# Patient Record
Sex: FEMALE | Race: OTHER | Employment: UNEMPLOYED | URBAN - METROPOLITAN AREA
[De-identification: names, ages, dates, MRNs, and addresses within clinical notes are randomized per-mention and may not be internally consistent; named-entity substitution may affect disease eponyms.]

---

## 2018-05-02 ENCOUNTER — OFFICE VISIT (OUTPATIENT)
Dept: FAMILY MEDICINE CLINIC | Age: 33
End: 2018-05-02

## 2018-05-02 VITALS
HEIGHT: 64 IN | SYSTOLIC BLOOD PRESSURE: 114 MMHG | HEART RATE: 66 BPM | TEMPERATURE: 95.7 F | OXYGEN SATURATION: 100 % | WEIGHT: 127 LBS | BODY MASS INDEX: 21.68 KG/M2 | DIASTOLIC BLOOD PRESSURE: 68 MMHG | RESPIRATION RATE: 16 BRPM

## 2018-05-02 DIAGNOSIS — N92.6 MISSED MENSES: Primary | ICD-10-CM

## 2018-05-02 DIAGNOSIS — Z92.29 H/O HIGH RISK MEDICATION TREATMENT: ICD-10-CM

## 2018-05-02 PROBLEM — Z34.90 PREGNANCY: Status: ACTIVE | Noted: 2018-05-02

## 2018-05-02 LAB
BILIRUB UR QL STRIP: NEGATIVE
GLUCOSE UR-MCNC: NEGATIVE MG/DL
HCG URINE, QL. (POC): POSITIVE
KETONES P FAST UR STRIP-MCNC: NEGATIVE MG/DL
PH UR STRIP: 7.5 [PH] (ref 4.6–8)
PROT UR QL STRIP: NEGATIVE
SP GR UR STRIP: 1.02 (ref 1–1.03)
UA UROBILINOGEN AMB POC: NORMAL (ref 0.2–1)
URINALYSIS CLARITY POC: CLEAR
URINALYSIS COLOR POC: YELLOW
URINE BLOOD POC: NEGATIVE
URINE LEUKOCYTES POC: NEGATIVE
URINE NITRITES POC: NEGATIVE
VALID INTERNAL CONTROL?: YES

## 2018-05-02 RX ORDER — SWAB
1 SWAB, NON-MEDICATED MISCELLANEOUS DAILY
COMMUNITY

## 2018-05-02 NOTE — PATIENT INSTRUCTIONS
Kal Lundberg consultas prenatales - [ Learning About Prenatal Visits ]  Instrucciones de cuidado    Las consultas prenatales regulares son muy importantes anthony cualquier [facility]. Estas breves visitas al [employer] podrían parecer simples y rutinarias. Monie pueden ayudarles a usted y olson bebé a mantenerse sanos. Olson médico está alerta a problemas que solo se pueden detectar si se los Beula Otis a usted y a olson bebé en forma regular. Estas consultas [employer] dan a usted y a olson médico tiempo para establecer júnior buena relación. Muchas mujeres acuden a consultas prenatales con júnior frecuencia de 4 a 6 semanas hasta la semana 28 del [facility]. [facility] consultas se [employer]. Con frecuencia, esto ocurre cada 2 o 3 semanas hasta la semana 36 del [facility]. Anthony el último mes del [facility], es probable que acuda a rustam a olson médico cada semana. Podría tener un programa distinto si tiene un problema médico o es adolescente. En distintos momentos del [facility]), se le harán exámenes y [place]. Algunos son [place]. Otros se hacen solamente cuando existen riesgos de que ocurra un problema. Todas las cosas que silvio por olson jong [place] a olson bebé en desarrollo. Descanse cuando lo necesite. Aliméntese heide, pretty abundante agua y silvio ejercicio de Layla regular. La atención de seguimiento es júnior parte clave de olson tratamiento y seguridad. Asegúrese de hacer y acudir a todas las citas, y llame a olson médico si está teniendo problemas. También es júnior buena idea saber los resultados de los exámenes y mantener júnior lista de los medicamentos que elke. ¿Qué ocurre anthony júnior consulta prenatal?  · Le medirán la presión arterial y le harán también análisis de [facility]. Es posible que [employer] sara análisis de Sergey jacobson. Si necesita ir al baño mientras espera al médico, avísele a la enfermera. Classie Mikey un recipiente para muestras con el fin de poder examinar olson orina.   · La pesarán y le medirán el abdomen. · Mims médico podría escuchar los latidos del corazón de mims bebé con un estetoscopio especial.  · Anthony el melony trimestre, el médico le revisará el azúcar en la mian (prueba de tolerancia a la glucosa) para detectar diabetes que puede aparecer anthony el Arturo Hose. Esta diabetes se conoce atul diabetes gestacional y puede hacer daño al bebé. · Se le harán pruebas para detectar infecciones que podrían hacer daño a mims bebé. Estas incluyen el estreptococo de deandre B y hepatitis B.  · Mims médico podría hacerle ecografías para detectar cualquier problema. Así también se comprobará la posición de mims bebé. Ozzie Beni [place] ondas de april para producir júnior imagen de mims bebé. · Es posible que le sara otras pruebas en cualquier momento del [place]. · Aproveche las consultas para conversar con mims médico acerca de cualquier inquietud que tenga. ¿Cómo puede cuidarse en el hogar? · Descanse lo suficiente. · [employer] días, si mims médico lo [place]. Si no ha hecho ejercicio en el pasado, comience poco a poco. Nell muchas caminatas cortas todos los días. · Siga júnior Elizabeth Esters. Asegúrese de incluir en mims dieta abundantes frijoles (habichuelas), arvejas (chícharos) y verduras de hojas verdes. · Steffanie abundantes líquidos, los suficientes atul para que mims orina sea de color amarillo andrés o transparente atul el agua. Steffanie agua. Reduzca las bebidas con cafeína, atul el café, el té y las bebidas cola. Si tiene [employer], del corazón o del hígado y tiene que Karen's líquidos, hable con mims médico antes de aumentar mims consumo. · Evite el humo del tabaco, el alcohol y las drogas, los gases químicos, los vapores de la pintura y los venenos. No fume ni use tabaco. Si necesita ayuda para dejar de fumar, hable con mims médico sobre programas y medicamentos para dejar de fumar. Pueden aumentar david probabilidades de dejar el hábito para siempre.   · Revise todos los medicamentos que esté tomando con mims Shilpa Collar sea necesario cambiar algunos de david medicamentos de rutina para proteger al bebé. No deje de boyd ni comience a boyd ningún medicamento sin hablar antes con mims médico.  ¿Dónde puede encontrar más información en inglés? Jorge Lopez a http://jacy-rashida.info/. Escriba K668 en la búsqueda para aprender más acerca de \"Aprenda sobre las consultas prenatales - [ Learning About Prenatal Visits ]. \"  Revisado: 16 marzo, 2017  Versión del contenido: 11.4  © 5123-6263 Healthwise, Incorporated. Las instrucciones de cuidado fueron adaptadas bajo licencia por [employer] (which disclaims liability or warranty for this information). Si usted tiene [place] afección médica o sobre estas instrucciones, siempre pregunte a mims profesional de jong. Healthwise, Incorporated niega toda garantía o responsabilidad por mims uso de esta información. Semanas 14 a 18 de mims embarazo: Instrucciones de cuidado - [ Elenore Scriver 14 to 25 of Your Pregnancy: Care Instructions ]  Instrucciones de cuidado    [address], es posible que se le empiece a notar que está Chuck de [place]. También podría observar algunos cambios en la piel, atul picazón en algunas zonas de las lamar de las ines o acné en la sol. Arby Layer, mims bebé puede orinar y david primeras heces (meconio) comienzan a acumularse en el intestino. Alannah Horsfall a crecerle el matilda en la ari. En mims próxima visita, [employer] 18 y 21, mims médico podría hacerle júnior ecografía. La prueba le permite al médico verificar si hay ciertos problemas. Mims médico también puede determinar el sexo de mims bebé. Liza es un buen momento para pensar si Azell Leyland si mims bebé es Vanetta Hunger. Hable con mims médico acerca de ponerse la vacuna contra la gripe para ayudar a mantenerse hyacinth anthony el embarazo. Con el transcurrir del [facility], es común sentirse preocupada o ansiosa. Mims cuerpo está [employer].  Y usted está pensando en dahlia a kemar, en la jong de mims bebé y en convertirse en madre. Puede aprender a sobrellevar la ansiedad y el estrés que siente. La atención de seguimiento es júnior parte clave de mims tratamiento y seguridad. Asegúrese de hacer y acudir a todas las citas, y llame a mims médico si está teniendo problemas. También es júnior buena idea saber los resultados de los exámenes y mantener júnior lista de los medicamentos que elke. ¿Cómo puede cuidarse en el hogar? ?Harrel Socorro  ? · Pida ayuda para cocinar y [employer]. ? · Entienda quién o qué le provoca estrés. Evite a estas personas o situaciones tanto atul le sea posible. ? · Relájese todos los días. Tomarse descansos de 10 a 15 minutos puede hacerle sentir júnior gran diferencia. Camine, escuche música o tome un baño tibio. ? · [place] clases de yoga o educación prenatal para aprender técnicas de relajación. También puede comprar un disco compacto de relajación. ? · [employer] lista de david temores acerca de tener el bebé y ser [place]. Comparta la lista con alguien de mims confianza. [place] inquietudes son verdaderamente pequeñas, y trate de deshacerse de ellas. Ejercicio  ? · Si no hizo mucho ejercicio antes del embarazo, comience poco a poco. Lo mejor es caminar. Regule mims ritmo y silvio un poco más cada día. ? · La caminata rápida, el trote lento, los ejercicios aeróbicos de bajo impacto, los ejercicios aeróbicos en el agua y el yoga son Erlene Sravanthi opciones. Algunos deportes, atul el buceo, la equitación, el esquí Angelica, la gimnasia y el esquí acuático no son Marsa Balsam idea. ? · Trate de hacer por lo menos 2½ horas de ejercicio moderado a la semana, atul, por ejemplo, júnior caminata rápida. Estefany Fair de hacer esto es estar activo 30 minutos al día, por lo menos 5 días de la [place]. Está heide estar activo en bloques de 10 minutos o más anthony el día y la [place]. ? · Use ropa holgada.  Use zapatos y un sostén que le proporcionen un buen soporte. ? · Lola Puffer de calentamiento y enfriamiento para comenzar y finalizar david ejercicios. ? · Si desea usar pesas, asegúrese de que elba livianas. Estas reducen la tensión en las articulaciones. ?Manténgase en el peso ideal para usted  ? · Los expertos recomiendan el aumento de 1 castillo (medio kilo) al [employer] anthony los 3 primeros meses del [facility]. ? · También recomiendan aumentar 1 castillo a la [employer] 6 últimos meses del [facility], para aumentar de 25 a 35 libras (11 a 16 kg) en total.   ? · Si está por debajo del peso recomendable para usted, necesitará aumentar más, de 28 a 40 libras (13 a 18 kg) aproximadamente. ? · Si tiene sobrepeso, quizás no deba aumentar tanto de [place], de 15 a 25 libras (7 a 11 kg) aproximadamente. ? · Si está subiendo de [employer], use mims sentido común. Lola Louis [employer], y [employer] Creditera, la comida rápida y [place]. Bridget Lisa, frutas y verduras. ? · Si va a tener gemelos o más bebés, es posible que mims médico la remita a un dietista. ¿Dónde puede encontrar más información en inglés? Joshua Newberry a http://jacy-rashida.info/. Nahun Spotted P243 en la búsqueda para aprender más acerca de \"Semanas 14 a 18 de mims embarazo: Instrucciones de cuidado - [ Mary Young 14 to 25 of Your Pregnancy: Care Instructions ]. \"  Revisado: 16 marzo, 2017  Versión del contenido: 11.4  © 4622-6155 Healthwise, Incorporated. Las instrucciones de cuidado fueron adaptadas bajo licencia por [employer] (which disclaims liability or warranty for this information). Si usted tiene [place] afección médica o sobre estas instrucciones, siempre pregunte a mims profesional de jong. [employer] Incorporated niega toda garantía o responsabilidad por mims uso de esta información.

## 2018-05-02 NOTE — PROGRESS NOTES
HPI       Chief Complaint: missed menses    Jennifer Byrd is a 29 y.o. female who presents to confirm pregnancy. [employer]  [id number] used. States she has hx of irregular menses. LMP 18, but reports it was very light. She had a contraceptive injection \"Nomagest\" sent from [place] for birth control. Did not check a UPT prior to injecting it, was not aware she was pregnant. Took it to a pharmacist but they refused to administer it, so her friend administered the injection. She received only that one injection 3/25/18, is supposed to be administered monthly She took took a home UPT in early April which was positive. She was delaying seeking medical attention as she did not want to accept that she was pregnant, but now wants to keep the baby as it is \"already in my stomach. \" Has a 10year old son. No alcohol, no tobacco. Is taking prenatal vitamins since she found out she was pregnancy. By LMP patient is 13w5d with MELL 18. Thinks she can feel the baby moving. No vaginal bleeding or LOF. No N/V. PMHx:  Past Medical History:   Diagnosis Date     delivery delivered      Meds:   Current Outpatient Prescriptions   Medication Sig Dispense Refill    prenatal vit-iron fumarate-fa (PRENATAL PLUS WITH IRON) 28 mg iron- 800 mcg tab Take 1 Tab by mouth daily.  traMADol-acetaminophen (ULTRACET) 37.5-325 mg per tablet Take 1 Tab by mouth every six (6) hours as needed for Pain. Max Daily Amount: 4 Tabs. 12 Tab 0     Allergies:   No Known Allergies    Smoker:  History   Smoking Status    Never Smoker   Smokeless Tobacco    Never Used     ETOH:   History   Alcohol Use No     FH:   No family history on file. ROS  Review of Systems   Constitutional: Negative for chills, fever and weight loss. HENT: Negative for congestion, sinus pain and sore throat. Eyes: Negative for blurred vision and double vision. Respiratory: Negative for cough, shortness of breath and wheezing. Cardiovascular: Negative for chest pain and palpitations. Gastrointestinal: Negative for abdominal pain, constipation, diarrhea, nausea and vomiting. Genitourinary: Negative for dysuria, frequency and urgency. No vaginal bleeding or LOF. + FM   Neurological: Negative for dizziness, weakness and headaches. Physical Exam:  Visit Vitals    /68    Pulse 66    Temp 95.7 °F (35.4 °C) (Oral)    Resp 16    Ht 5' 4\" (1.626 m)    Wt 127 lb (57.6 kg)    LMP  (LMP Unknown)    SpO2 100%    BMI 21.8 kg/m2       Wt Readings from Last 3 Encounters:   05/02/18 127 lb (57.6 kg)   03/02/15 120 lb (54.4 kg)   01/29/15 113 lb (51.3 kg)     BP Readings from Last 3 Encounters:   05/02/18 114/68   03/02/15 126/65   01/29/15 123/73      Physical Exam   Constitutional: She is oriented to person, place, and time. She appears well-developed and well-nourished. HENT:   Head: Normocephalic and atraumatic. Eyes: EOM are normal.   Neck: Normal range of motion. Neck supple. No thyromegaly present. Cardiovascular: Normal rate and regular rhythm. No murmur heard. Pulmonary/Chest: Effort normal and breath sounds normal. No respiratory distress. She has no wheezes. She has no rales. Abdominal: Soft. Bowel sounds are normal. She exhibits no distension. There is no tenderness. Uterus visibly gravid. Soft, nontender, firm. Fundal height 20cm,    Neurological: She is alert and oriented to person, place, and time. Skin: Skin is warm and dry. Psychiatric: She has a normal mood and affect. Vitals reviewed.     I personally reviewed the following lab results:   Recent Results (from the past 12 hour(s))   AMB POC URINE PREGNANCY TEST, VISUAL COLOR COMPARISON    Collection Time: 05/02/18 10:13 AM   Result Value Ref Range    VALID INTERNAL CONTROL POC Yes     HCG urine, Ql. (POC) Positive Negative   AMB POC URINALYSIS DIP STICK AUTO W/O MICRO    Collection Time: 05/02/18 10:30 AM   Result Value Ref Range Color (UA POC) Yellow     Clarity (UA POC) Clear     Glucose (UA POC) Negative Negative    Bilirubin (UA POC) Negative Negative    Ketones (UA POC) Negative Negative    Specific gravity (UA POC) 1.020 1.001 - 1.035    Blood (UA POC) Negative Negative    pH (UA POC) 7.5 4.6 - 8.0    Protein (UA POC) Negative Negative    Urobilinogen (UA POC) 0.2 mg/dL 0.2 - 1    Nitrites (UA POC) Negative Negative    Leukocyte esterase (UA POC) Negative Negative             Assessment     29 y.o. female with:    ICD-10-CM ICD-9-CM    1. Missed menses N92.6 626.4 AMB POC URINE PREGNANCY TEST, VISUAL COLOR COMPARISON      AMB POC URINALYSIS DIP STICK AUTO W/O MICRO              Plan       Orders Placed This Encounter    AMB POC URINE PREGNANCY TEST, VISUAL COLOR COMPARISON    AMB POC URINALYSIS DIP STICK AUTO W/O MICRO     Pregnancy of unknown GA (13w5d by LMP but by fundal height closer to 20w GA)  - UPT positive in clinic  - Dating Ultrasound and Initial Prenatal scheduled for 5/8/18 with Dr. Blake Barron  - Continue prenatal vitamins    Patient discussed with Dr. Last Reed    I have discussed the diagnosis with the patient and the intended plan as seen in the above orders. The patient has received an after-visit summary and questions were answered concerning future plans. I have discussed medication side effects and warnings with the patient as well.     Mesha Manuel MD  Family Medicine Resident  PGY-1

## 2018-05-02 NOTE — PROGRESS NOTES
Chief Complaint   Patient presents with    Missed Menses     1. Have you been to the ER, urgent care clinic since your last visit? Hospitalized since your last visit? N/A    2. Have you seen or consulted any other health care providers outside of the [address] [place] [address] since your last visit? Include any pap smears or colon screening.  N/A

## 2018-05-02 NOTE — MR AVS SNAPSHOT
[address] 
[phone] Patient: Kyra Ernandez MRN: [id number] YH:[id number] Visit Information Uriel Aquino Personal Médico Departamento Teléfono del Dep. Número de visita 5/2/2018  9:45 AM Deya Lopez, [address] [facility] [phone] [id number] Upcoming Health Maintenance Date Due DTaP/Tdap/Td series (1 - Tdap) 1/19/2007 PAP AKA CERVICAL CYTOLOGY 1/19/2007 Influenza Age 5 to Adult 8/1/2018 Alergias  Review Complete El: 5/2/2018 Por: Heidi Chandra LPN A partir del:  5/2/2018 No Known Allergies Vacunas actuales Iona Michelle Lamount Pac Influenza Vaccine (Quad) PF 1/29/2015 No revisadas esta visita You Were Diagnosed With   
  
 Alejandro Clipper Missed menses    -  Primary ICD-10-CM: N92.6 ICD-9-CM: 626.4 Partes vitales PS Pulso Temperatura Resp Era ( percentil de crecimiento) Peso (percentil de crecimiento) 114/68 66 95.7 °F (35.4 °C) (Oral) 16 5' 4\" (1.626 m) 127 lb (57.6 kg) LMP (última jigar) SpO2 BMI (IMC) Estado obstétrico Estatus de tabaquísmo (LMP Unknown) 100% 21.8 kg/m2 Unknown Never Smoker Historial de signos vitales BMI and BSA Data Body Mass Index Body Surface Area  
 21.8 kg/m 2 1.61 m 2 [facility] Name Phone [id number] 23 [phone] Olson lista de medicamentos actualizada Martha Amel actualizada 5/2/18 10:46 AM.  Patsi Fillers use olson lista de medicamentos más reciente. prenatal vit-iron fumarate-fa 28 mg iron- 800 mcg Tab También conocido atul:  PRENATAL PLUS with IRON Take 1 Tab by mouth daily. traMADol-acetaminophen 37.5-325 mg per tablet También conocido atul:  ULTRACET  
 Take 1 Tab by mouth every six (6) hours as needed for Pain. Max Daily Amount: 4 Tabs. Hicimos lo siguiente AMB POC URINALYSIS DIP STICK AUTO W/O MICRO [08166 CPT(R)] AMB POC URINE PREGNANCY TEST, VISUAL COLOR COMPARISON [72543 CPT(R)] Instrucciones para el Paciente Rose Mighty consultas prenatales - [ Learning About Prenatal Visits ] Instrucciones de cuidado Las consultas prenatales regulares son muy importantes anthony cualquier [facility]. Estas breves visitas al [employer] podrían parecer simples y rutinarias. Monie pueden ayudarles a usted y mims bebé a mantenerse sanos. Mims médico está alerta a problemas que solo se pueden detectar si se los William Leather a usted y a mims bebé en forma regular. Estas consultas [employer] dan a usted y a mims médico tiempo para establecer júnior buena relación. Muchas mujeres acuden a consultas prenatales con júnior frecuencia de 4 a 6 semanas hasta la semana 28 del [facility]. [facility] consultas se [employer]. Con frecuencia, esto ocurre cada 2 o 3 semanas hasta la semana 36 del [facility]. Anthony el último mes del [facility], es probable que acuda a rustam a mims médico cada semana. Podría tener un programa distinto si tiene un problema médico o es adolescente. En distintos momentos del [facility], se le harán exámenes y [place]. Algunos son Arnie Coffee. Otros se hacen solamente cuando existen riesgos de que ocurra un problema. Todas las cosas que silvio por mims jong Emelina Gibran a mims bebé en desarrollo. Descanse cuando lo necesite. Aliméntese heide, pretty abundante agua y silvio ejercicio de Layla regular. La atención de seguimiento es júnior parte clave de mims tratamiento y seguridad. Asegúrese de hacer y acudir a todas las citas, y llame a mims médico si está teniendo problemas. También es júnior buena idea saber los resultados de los exámenes y mantener júnior lista de los medicamentos que elke.  

Paton Mchugh anthony júnior consulta prenatal? 
 · Le medirán la presión arterial y [address] [place] harán también análisis de [facility]. Es posible que [facility] sara análisis de [place]. Si necesita ir al baño mientras espera al médico, avísele a la enfermera. Classie Mikey un recipiente para muestras con el fin de poder examinar mims orina. · La pesarán y le medirán el abdomen. · Mims médico podría escuchar los latidos del corazón de mims bebé con un estetoscopio especial. 
· Anthony el melony trimestre, el médico le revisará el azúcar en la mian (prueba de tolerancia a la glucosa) para detectar diabetes que puede aparecer anthony el [facility]. Esta diabetes se conoce atul diabetes gestacional y puede hacer daño al bebé. · Se le harán pruebas para detectar infecciones que podrían hacer daño a mims bebé. Estas incluyen el estreptococo de deandre B y hepatitis B. 
· Mims médico podría hacerle ecografías para detectar cualquier problema. Así también se comprobará la posición de mims bebé. Deloria Hals [place] ondas de april para producir júnior imagen de mims bebé. · Es posible que le sara otras pruebas en cualquier momento del [facility]. · Aproveche las consultas para conversar con mims médico acerca de cualquier inquietud que tenga. Cómo puede cuidarse en el hogar? · Descanse lo suficiente. · [employer] días, si mims médico lo [place]. Si no ha hecho ejercicio en el pasado, comience poco a poco. Nell muchas caminatas cortas todos los días. · Siga júnior [facility]. Asegúrese de incluir en mims dieta abundantes frijoles (habichuelas), arvejas (chícharos) y verduras de hojas verdes. · Steffanie abundantes líquidos, los suficientes atul para que mims orina sea de color amarillo andrés o transparente atul el agua. Steffanie agua. Reduzca las bebidas con cafeína, atul el café, el té y las bebidas cola. Si tiene [facility], del corazón o del hígado y tiene que Karen's líquidos, hable con mims médico antes de aumentar mims consumo. · Evite el humo del tabaco, el alcohol y las drogas, los gases químicos, los vapores de la pintura y los venenos. No fume ni use tabaco. Si necesita ayuda para dejar de fumar, hable con mims médico sobre programas y medicamentos para dejar de fumar. Pueden aumentar david probabilidades de dejar el hábito para siempre. · Revise todos los medicamentos que esté tomando con mims Maryann Bonnet sea necesario cambiar algunos de david medicamentos de rutina para proteger al bebé. No deje de boyd ni comience a boyd ningún medicamento sin hablar antes con mims médico. 

Dónde puede encontrar más información en inglés? Jayde End a [email or web address].[email or web address] Escriba V079 en la búsqueda para aprender más acerca de \"Aprenda sobre las consultas prenatales - [ Learning About Prenatal Visits ]. \" 
Revisado: 16 marzo, 2017 Versión del contenido: 11.4 © 3195-2803 Healthwise, Incorporated. Las instrucciones de cuidado fueron adaptadas bajo licencia por Good Help Connections (which disclaims liability or warranty for this information). Si usted tiene George Stout afección médica o sobre estas instrucciones, siempre pregunte a mims profesional de jong. Healthwise, Incorporated niega toda garantía o responsabilidad por mims uso de esta información. Semanas 14 a 18 de mims embarazo: Instrucciones de cuidado - [ [place] Santy 14 to 25 of Your Pregnancy: Care Instructions ] Instrucciones de cuidado Shaniqua [employer], es posible que se le empiece a notar que está [place]. También podría observar algunos cambios en la piel, atul picazón en algunas zonas de las lamar de las ines o acné en la sol. Darcella Dragon, mims bebé puede orinar y david primeras heces (meconio) comienzan a acumularse en el intestino. Selene Cocking a crecerle el matilda en la ari. En mims próxima visita, [employer] 18 y 21, mims médico podría hacerle júnior ecografía.  La prueba le permite al médico verificar si hay ciertos problemas. Mims médico también puede determinar el sexo de mims bebé. Liza es un buen momento para pensar si Yvonnie New si mims bebé es Willis Sandifer. Hable con mims médico acerca de ponerse la vacuna contra la gripe para ayudar a mantenerse hyacinth anthony el embarazo. Con el transcurrir del Ettie Oven, es común sentirse preocupada o ansiosa. Mims cuerpo está [employer]. Y usted está pensando en dahlia a kemar, en la jong de mims bebé y en convertirse en madre. Puede aprender a sobrellevar la ansiedad y el estrés que siente. La atención de seguimiento es júnior parte clave de mims tratamiento y seguridad. Asegúrese de hacer y acudir a todas las citas, y llame a mims médico si está teniendo problemas. También es júnior buena idea saber los resultados de los exámenes y mantener júnior lista de los medicamentos que elke. Cómo puede cuidarse en el hogar? ?Mallie [place] ? · Pida ayuda para cocinar y [employer]. ? · Entienda quién o qué le provoca estrés. Evite a estas personas o situaciones tanto atul le sea posible. ? · Relájese todos los días. Tomarse descansos de 10 a 15 minutos puede hacerle sentir júnior gran diferencia. Camine, escuche música o tome un baño tibio. ? · [place] clases de yoga o educación prenatal para aprender técnicas de relajación. También puede comprar un disco compacto de relajación. ? · [employer] lista de david temores acerca de tener el bebé y ser [place]. Comparta la lista con alguien de mims confianza. Melissa Poplin inquietudes son verdaderamente pequeñas, y trate de deshacerse de ellas. Ejercicio ? · Si no hizo mucho ejercicio antes del embarazo, comience poco a poco. Lo mejor es caminar. Regule mims ritmo y silvio un poco más cada día. ? · La caminata rápida, el trote lento, los ejercicios aeróbicos de bajo impacto, los ejercicios aeróbicos en el agua y el yoga son Maris Rumble opciones.  Algunos deportes, atul el buceo, la equitación, el esquí Angelica, la gimnasia y el esquí acuático no son Francetta Alter idea. ? · Trate de hacer por lo menos 2½ horas de ejercicio moderado a la semana, atul, por ejemplo, júnior caminata rápida. Sony Ambrose de hacer esto es estar activo 30 minutos al día, por lo menos 5 días de la [place]. Está heide estar activo en bloques de 10 minutos o más anthony el día y la [place]. ? · Use ropa holgada. Use zapatos y un sostén que le proporcionen un buen soporte. ? · Tena Grapes de calentamiento y enfriamiento para comenzar y finalizar david ejercicios. ? · Si desea usar pesas, asegúrese de que elba livianas. Estas reducen la tensión en las articulaciones. ?Manténgase en el peso ideal para usted ? · Los expertos recomiendan el aumento de 1 castillo (medio kilo) al [employer] anthony los 3 primeros meses del [facility]. ? · También recomiendan aumentar 1 castillo a la [employer] 6 últimos meses del [facility], para aumentar de 25 a 35 libras (11 a 16 kg) en total.  
? · Si está por debajo del peso recomendable para usted, necesitará aumentar más, de 28 a 40 libras (13 a 18 kg) aproximadamente. ? · Si tiene sobrepeso, quizás no deba aumentar tanto de [place], de 15 a 25 libras (7 a 11 kg) aproximadamente. ? · Si está subiendo de Dell Shell, use mims sentido común. Tena Grapes [employer]et [employer], y Aon [employer]Effect, la comida rápida y [place]. Arron Alley, frutas y verduras. ? · Si va a tener gemelos o más bebés, es posible que mims médico la remita a un dietista. Dónde puede encontrar más información en inglés? Leeanna Rodriguez a http://jacy-[email or web address].info/. Rosalee Diallo S609 en la búsqueda para aprender más acerca de \"Semanas 14 a 18 de mims embarazo: Instrucciones de cuidado - [ Benjaman Stewart 14 to 25 of Your Pregnancy: Care Instructions ]. \" 
Revisado: 16 marzo, 2017 Versión del contenido: 11.4 © 2001-6666 Healthwise, Incorporated.  Las instrucciones de cuidado fueron adaptadas bajo licencia por Good Help Connections (which disclaims liability or warranty for this information). Si usted tiene [place] afección médica o sobre estas instrucciones, siempre pregunte a mims profesional de jong. [facility], Incorporated niega toda garantía o responsabilidad por mims uso de esta información. Introducing [facility] SERVICES! [facility] introduce portal paciente MyChart . Ahora se puede acceder a partes de mims expediente médico, enviar por correo electrónico la oficina de mims médico y solicitar renovaciones de medicamentos en línea. En mims navegador de Internet , [place] a [email or web address] Silvio clic en el usuario por Chalino Males? Carry Cait clic aquí en la sesión Graves Hoots. Verá la página de registro [place]. Ingrese mims código de Bank [employer] Janine aleks y atul aparece a continuación. Usted no tendrá que [employer] código después de ba completado el proceso de registro . Si usted no se inscribe antes de la fecha de caducidad , debe solicitar un nuevo código. · MyChart Código de acceso : [id number] Expires: 7/31/2018 10:03 AM 
 
Ingresa los últimos cuatro dígitos de mims Número de Seguro Social ( xxxx ) y fecha de nacimiento ( dd / mm / aaaa ) atul se indica y silvio clic en Enviar. Chavo será llevado a la siguiente página de registro . Crear un ID MyChart . Esta será mims ID de inicio de sesión de MyChart y no puede ser [place] , por lo que pensar en júnior que es Charma Numbers y fácil de recordar . Crear júnior contraseña MyChart . ted puede cambiar mims contraseña en cualquier momento . Ingrese mims Password Reset de preguntas y [place] . Laflin se puede utilizar en un momento posterior si usted olvida mims contraseña. Introduzca mims dirección de correo electrónico . Kenton Linaresn recibirá júnior notificación por correo electrónico cuando la nueva información está disponible en MyChart . Jackie feliciano en Registrarse.  Helder Ames rustam y descargar porciones de mims expediente Pan feliciano en el enlace de descarga del menú Resumen para descargar júnior copia portátil de mims información médica . Si tiene Diann [employer] , por favor visite la sección de preguntas frecuentes del sitio web MyChart . Recuerde, MyChart NO es que se utilizará para las necesidades urgentes. Para emergencias médicas , llame al 911 . Ahora disponible en mims iPhone y Android ! Por favor proporcione herve resumen de la documentación de cuidado a mims próximo proveedor. Your primary care clinician is listed as NONE. If you have any questions after today's visit, please call [phone].

## 2018-05-03 NOTE — PROGRESS NOTES
I reviewed with the resident the medical history and the resident's findings on the physical examination. I discussed with the resident the patient's diagnosis and concur with the plan. Pt with late prenatal care. Irregular menses and high risk use of medications in the first trimester. Schedule initial prenatal ASAP with referral to MFM soon thereafter. Cont PNV. ED warnings.

## 2018-05-07 ENCOUNTER — TELEPHONE (OUTPATIENT)
Dept: FAMILY MEDICINE CLINIC | Age: 33
End: 2018-05-07

## 2018-05-07 NOTE — TELEPHONE ENCOUNTER
Dr. Conchita Lake telephone  Received:  Today       Jesse JENSEN [facility] Front Office                     The pt requested to r/s her 5/8 procedure appt through [facility].

## 2018-05-07 NOTE — TELEPHONE ENCOUNTER
Called patient to reschedule, verified 2 ID identifier, patient states she didn't cancel appointment. Patient appointment was rescheduled back to 2:00 PM May 8 with Dr Bernie Foss.

## 2018-05-08 ENCOUNTER — OFFICE VISIT (OUTPATIENT)
Dept: FAMILY MEDICINE CLINIC | Age: 33
End: 2018-05-08

## 2018-05-08 ENCOUNTER — HOSPITAL ENCOUNTER (OUTPATIENT)
Dept: LAB | Age: 33
Discharge: HOME | End: 2018-05-08
Payer: SELF-PAY

## 2018-05-08 ENCOUNTER — INITIAL PRENATAL (OUTPATIENT)
Dept: FAMILY MEDICINE CLINIC | Age: 33
End: 2018-05-08

## 2018-05-08 VITALS
SYSTOLIC BLOOD PRESSURE: 118 MMHG | HEART RATE: 73 BPM | BODY MASS INDEX: 21.89 KG/M2 | TEMPERATURE: 98.1 F | DIASTOLIC BLOOD PRESSURE: 72 MMHG | RESPIRATION RATE: 16 BRPM | HEIGHT: 64 IN | OXYGEN SATURATION: 98 % | WEIGHT: 128.2 LBS

## 2018-05-08 DIAGNOSIS — Z34.90 NORMAL PREGNANCY, UNSPECIFIED TRIMESTER: ICD-10-CM

## 2018-05-08 DIAGNOSIS — O09.30 LATE PRENATAL CARE AFFECTING PREGNANCY, ANTEPARTUM: ICD-10-CM

## 2018-05-08 DIAGNOSIS — Z34.90 NORMAL INTRAUTERINE PREGNANCY, ANTEPARTUM: ICD-10-CM

## 2018-05-08 DIAGNOSIS — Z92.29 H/O HIGH RISK MEDICATION TREATMENT: Primary | ICD-10-CM

## 2018-05-08 LAB
ANTIBODY SCREEN, EXTERNAL: NEGATIVE
BILIRUB UR QL STRIP: NEGATIVE
CHLAMYDIA, EXTERNAL: NEGATIVE
GLUCOSE UR-MCNC: NEGATIVE MG/DL
HBSAG, EXTERNAL: NEGATIVE
HCT, EXTERNAL: 33.8
HGB, EXTERNAL: 11.4
HIV, EXTERNAL: NONREACTIVE
KETONES P FAST UR STRIP-MCNC: NEGATIVE MG/DL
N. GONORRHEA, EXTERNAL: NEGATIVE
PH UR STRIP: 6.5 [PH] (ref 4.6–8)
PLATELET CNT,   EXTERNAL: 243
PROT UR QL STRIP: NEGATIVE
RUBELLA, EXTERNAL: NORMAL
SP GR UR STRIP: 1.03 (ref 1–1.03)
T. PALLIDUM, EXTERNAL: NEGATIVE
TYPE, ABO & RH, EXTERNAL: NORMAL
UA UROBILINOGEN AMB POC: NORMAL (ref 0.2–1)
URINALYSIS CLARITY POC: CLEAR
URINALYSIS COLOR POC: YELLOW
URINALYSIS, EXTERNAL: NEGATIVE
URINE BLOOD POC: NEGATIVE
URINE LEUKOCYTES POC: NEGATIVE
URINE NITRITES POC: NEGATIVE
VARICELLA, EXTERNAL: NORMAL

## 2018-05-08 PROCEDURE — 88175 CYTOPATH C/V AUTO FLUID REDO: CPT

## 2018-05-08 PROCEDURE — 87624 HPV HI-RISK TYP POOLED RSLT: CPT

## 2018-05-08 PROCEDURE — 87491 CHLMYD TRACH DNA AMP PROBE: CPT | Performed by: FAMILY MEDICINE

## 2018-05-08 NOTE — PATIENT INSTRUCTIONS
Semanas 22 a 26 de mims embarazo: Instrucciones de cuidado - [ Rebbecca Fabricio 22 to 26 of Your Pregnancy: Care Instructions ]  Instrucciones de cuidado    Al comenzar el 7.º mes de mims embarazo en la semana 26, los pulmones de mims bebé se están volviendo más emiliano y se están preparando para respirar. Podría notar que mims bebé responde al april de mims voz o la de mims stefano. También podría notar que mims bebé se voltea y [place] menos de posición, y se retuerce o sacude más. Por lo general, las sacudidas indican que mims bebé tiene hipo. Tener hipo es totalmente normal y dura poco tiempo. Konstantin vez sea buena idea pensar en asistir a júnior clase de preparación para el parto. Herve es también un buen momento para comenzar a pensar si desea que anthony el parto le administren un analgésico (medicamento para el dolor). A la mayoría de las mujeres embarazadas les hacen pruebas para la diabetes gestacional entre las semanas 24 y 29. La diabetes gestacional ocurre cuando el nivel de azúcar en la mian es muy alto anthony el [facility]. La prueba es importante, porque usted puede tener diabetes gestacional y no saberlo. Monie esta enfermedad puede causarle problemas a mims bebé. La atención de seguimiento es júnior parte clave de mims tratamiento y seguridad. Asegúrese de hacer y acudir a todas las citas, y llame a mims médico si está teniendo problemas. También es júnior buena idea saber los resultados de los exámenes y mantener júnior lista de los medicamentos que elke. ¿Cómo puede cuidarse en el hogar? Alivie las molestias de las patadas de mims bebé  · Cambie de posición. A veces, herve cambio hace que mims bebé también cambie de posición. · Respire hondo al mismo tiempo que levanta los brazos sobre mims [place]. Después exhale a medida que baja los brazos. Nell los ejercicios de Kegel para evitar pérdidas de [facility]  · Lockheed Yang ejercicios de Kegel estando alba o sentada. ¨ Apriete los mismos músculos que usaría para detener el chorro de [facility].  El abdomen y los muslos no deben moverse. ¨ Manténgalos apretados anthony 3 segundos y, luego, relájelos otros 3 segundos. ¨ Empiece con 3 segundos. Nick Barrera, añada 1 melony cada semana hasta que sea capaz de apretar anthony 10 segundos. ¨ Repita el ejercicio entre 10 y 13 veces cada sesión. Nell raina o más sesiones cada día. Alivie o reduzca la hinchazón de los pies, los tobillos, las ines y los dedos  · Si tiene los dedos hinchados, quítese los [place]. · No coma alimentos con mucha sal, atul tabby fritas. · Coloque david pies sobre un banco o un sofá todo el tiempo que le sea posible. Duerma con almohadas debajo de los pies. · No se quede de pie anthony mucho tiempo ni use calzado ajustado. · Use medias elásticas de soporte. ¿Dónde puede encontrar más información en inglés? Ephraim Eye a [email or web address].info/. Escriba G264 en la búsqueda para aprender más acerca de \"Semanas 22 a 26 de mims embarazo: Instrucciones de cuidado - [ Lux Antu 22 to 26 of Your Pregnancy: Care Instructions ]. \"  Revisado: 16 marzo, 2017  Versión del contenido: 11.4  © 0321-7710 Healthwise, Incorporated. Las instrucciones de cuidado fueron adaptadas bajo licencia por Good Help Connections (which disclaims liability or warranty for this information). Si usted tiene [place] afección médica o sobre estas instrucciones, siempre pregunte a mims profesional de jong. Healthwise, Incorporated niega toda garantía o responsabilidad por mims uso de esta información.

## 2018-05-08 NOTE — MR AVS SNAPSHOT
[address] 
[phone] Patient: Theo Rodriguez MRN: [id number] EE Visit Information Alma Manriquez y Earl Personal Médico Departamento Teléfono del Dep. Número de visita 2018  2:05 PM Monica Marie MD 1515 [facility] [phone] 2018  1:45 PM  
OB VISIT with Monica Marie MD  
[address]) Appt Note: prenatal visit [address] [employer] [address] [place] [address]  
[phone]  
  
   
 [address] [place] [id number] Upcoming Health Maintenance Date Due DTaP/Tdap/Td series (1 - Tdap) 2007 PAP AKA CERVICAL CYTOLOGY 2007 Influenza Age 5 to Adult 2018 Alergias  Review Complete El: 2018 Por: Celia Valencia LPN A partir del:  2018 No Known Allergies Vacunas actuales Imer Cools Maya Rack Influenza Vaccine (Quad) PF 2015 No revisadas esta visita You Were Diagnosed With   
  
 Luis Trejo H/O high risk medication treatment    -  Primary ICD-10-CM: Z92.29 ICD-9-CM: V67.51 Late prenatal care affecting pregnancy, antepartum     ICD-10-CM: O09.30 ICD-9-CM: V23.7 Normal intrauterine pregnancy, antepartum     ICD-10-CM: Z34.90 ICD-9-CM: V22.1 Normal pregnancy, unspecified trimester     ICD-10-CM: Z34.90 ICD-9-CM: V22.1 Partes vitales PS Pulso Temperatura Resp [place] ( percentil de crecimiento) Peso (percentil de crecimiento) 118/72 73 98.1 °F (36.7 °C) (Oral) 16 5' 4\" (1.626 m) 128 lb 3.2 oz (58.2 kg) LMP (última jigar) SpO2 BMI (IMC) Estado obstétrico Estatus de tabaquísmo (LMP Unknown) 98% 22.01 kg/m2 Unknown Never Smoker Historial de signos vitales BMI and BSA Data Body Mass Index Body Surface Area 22.01 kg/m 2 1.62 m 2 [facility]a [facility] Name Phone [id number]  Raghavendra Aldair [address] [phone] Olson lista de medicamentos actualizada Zion Mars actualizada 5/8/18  3:23 PM.  Maurizio Domingo use olson lista de medicamentos más reciente. prenatal vit-iron fumarate-fa 28 mg iron- 800 mcg Tab También conocido atul:  PRENATAL PLUS with IRON Take 1 Tab by mouth daily. Hicimos lo siguiente AMB POC URINALYSIS DIP STICK AUTO W/O MICRO [89356 CPT(R)] ANTIBODY SCREEN [id number] CPT(R)] BLOOD TYPE, (ABO+RH) [22854 CPT(R)] CBC WITH AUTOMATED DIFF [26859 CPT(R)] CHLAMYDIA/GC PCR [38462 CPT(R)] CULTURE, URINE [id number] CPT(R)] HEP B SURFACE AG [id number] CPT(R)] HIV 1/2 AG/AB, 4TH GENERATION,W RFLX CONFIRM [id number] CPT(R)] PAP IG,CT-NG, APTIMA HPV AND RFX 16/18,45 ([id number]) [[id number] Custom] RUBELLA AB, IGG [id number] CPT(R)] T PALLIDUM SCREEN W/REFLEX [[id number] Custom] VARICELLA-ZOSTER V AB, IGG [id number] CPT(R)] Instrucciones para el Paciente Semanas 22 a 26 de olson embarazo: Instrucciones de cuidado - [ Marivel Cools 22 to 26 of Your Pregnancy: Care Instructions ] Instrucciones de cuidado Al comenzar el 7.º mes de olson embarazo en la semana 32, los pulmones de olson bebé se están volviendo más emiliano y se están preparando para respirar. Podría notar que olson bebé responde al april de olson voz o la de olson stefano. También podría notar que olson bebé se voltea y [place] menos de posición, y se retuerce o sacude más. Por lo general, las sacudidas indican que olson bebé tiene hipo. Tener hipo es totalmente normal y dura poco tiempo. Konstantin vez sea buena idea pensar en asistir a júnior clase de preparación para el parto. Liza es también un buen momento para comenzar a pensar si desea que anthony el parto le administren un analgésico (medicamento para el dolor).  
A la mayoría de las mujeres embarazadas les hacen pruebas para la diabetes gestacional entre las semanas 24 y 29. La diabetes gestacional ocurre cuando el nivel de azúcar en la mian es muy alto anthony el [facility]. La prueba es importante, porque usted puede tener diabetes gestacional y no saberlo. Monie esta enfermedad puede causarle problemas a mims bebé. La atención de seguimiento es júnior parte clave de mims tratamiento y seguridad. Asegúrese de hacer y acudir a todas las citas, y llame a mims médico si está teniendo problemas. También es júnior buena idea saber los resultados de los exámenes y mantener júnior lista de los medicamentos que elke. Cómo puede cuidarse en el hogar? Alivie las molestias de las patadas de mims bebé · Cambie de posición. A veces, herve cambio hace que mims bebé también cambie de posición. · Respire hondo al mismo tiempo que levanta los brazos sobre mims Josy Amabile. Después exhale a medida que baja los brazos. Nell los ejercicios de Kegel para evitar pérdidas de [place] · Puede hacer los ejercicios de Keg[place] estando alba o sentada. ¨ Apriete los mismos músculos que usaría para detener el chorro de [place]. El abdomen y los muslos no deben moverse. ¨ Manténgalos apretados anthony 3 segundos y, luego, relájelos otros 3 segundos. ¨ Empiece con 3 segundos. Alpa Rogue, añada 1 melony cada semana hasta que sea capaz de apretar anthony 10 segundos. ¨ Repita el ejercicio entre 10 y 13 veces cada sesión. Nell raina o más sesiones cada día. Alivie o reduzca la hinchazón de los pies, los tobillos, las ines y los dedos · Si tiene los dedos hinchados, quítese los [place]. · No coma alimentos con mucha sal, atul tabby fritas. · Coloque david pies sobre un banco o un sofá todo el tiempo que le sea posible. Duerma con almohadas debajo de los pies. · No se quede de pie anthony mucho tiempo ni use calzado ajustado. · Use medias elásticas de soporte. Dónde puede encontrar más información en inglés? Keith De Jesus a http://jacy-rashida.info/. Escriba G264 en la búsqueda para aprender más acerca de \"Semanas 22 a 26 de mims embarazo: Instrucciones de cuidado - [ José Hauula 22 to 26 of Your Pregnancy: Care Instructions ]. \" 
Revisado: 16 marzo, 2017 Versión del contenido: 11.4 © 8223-9902 Healthwise, Incorporated. Las instrucciones de cuidado fueron adaptadas bajo licencia por [facility] [employer]ctions (which disclaims liability or warranty for this information). Si usted tiene [place] afección médica o sobre estas instrucciones, siempre pregunte a mims profesional de jong. Healthwise, Incorporated niega toda garantía o responsabilidad por mims uso de esta información. Introducing \[facility] [place] [facility]\"" SERVICES! [facility] introduce portal paciente MyChart . Ahora se puede acceder a partes de mims expediente médico, enviar por correo electrónico la oficina de mims médico y solicitar renovaciones de medicamentos en línea. En mims navegador de Internet , Caitlin Gabrieli a [email or web address] Silvio clic en el usuario por Savita Comber? Celi Halon clic aquí en la sesión Loanne . Verá la página de registro [place]. Ingrese mims código de [employer] aleks y atul aparece a continuación. Usted no tendrá que [employer] código después de ba completado el proceso de registro . Si usted no se inscribe antes de la fecha de caducidad , debe solicitar un nuevo código. · MyChart Código de acceso : [id number] Expires: 7/31/2018 10:03 AM 
 
Ingresa los últimos cuatro dígitos de mims Número de Seguro Social ( xxxx ) y fecha de nacimiento ( dd / mm / aaaa ) atul se indica y silvio clic en Enviar. ted será llevado a la siguiente página de registro . Crear un ID MyChart . Esta será mims ID de inicio de sesión de MyChart y no puede ser [place] , por lo que pensar en júnior que es [place] y fácil de recordar . Crear júnior contraseña MyChart . Usted puede cambiar mims contraseña en cualquier momento . Ingrese mims Password Reset de preguntas y [place] .  [place] se puede utilizar en un momento posterior si usted olvida mims contraseña. Introduzca mims dirección de correo electrónico . Jojo Garcia recibirá júnior notificación por correo electrónico cuando la nueva información está disponible en MyChart . Demetrius feliciano en Registrarse. Thang Ndiayeour rustam y descargar porciones de mims expediente médico. 
Nell jodie en el enlace de descarga del menú Resumen para descargar júnior copia portátil de mims información médica . Si tiene [employer] , por favor visite la sección de preguntas frecuentes del sitio web [employer] . Recuerde, [employer] NO es que se utilizará para las necesidades urgentes. Para emergencias médicas , llame al 911 . Ahora disponible en mims iPhone y Android ! Por favor proporcione herve resumen de la documentación de cuidado a mims próximo proveedor. Your primary care clinician is listed as NONE. If you have any questions after today's visit, please call [phone].

## 2018-05-08 NOTE — PROGRESS NOTES
Chief Complaint   Patient presents with    Initial Prenatal Visit    Ultrasound     Dating ultrasound     1. Have you been to the ER, urgent care clinic since your last visit? Hospitalized since your last visit? No    2. Have you seen or consulted any other health care providers outside of the [address] [place] [address] since your last visit? Include any pap smears or colon screening. No       Patient denies any bleeding, cramping or leakage of fluid.     51fanli Interpretor: [id number]

## 2018-05-08 NOTE — PROGRESS NOTES
Subjective:   Amparo Anaya is a 29 y.o. female who is being seen today for her first obstetrical visit. Pregnancy confirmed on 18. This is not a planned pregnancy. She is at Unknown gestation. Unclear LMP 18 or 18. [id number]  · 2004: 40w (M) Term pregnancy: - Delivery method: . Fetal weight: 7lb4oz. Complications: none  · 7689 41w (M) Term pregnancy: - Delivery method: LTCS (VCU) Fetal weight: 7lb 10oz. Complications: Low HR, needed LTCS. No complications otherwise. Diagnosed with Autism. · 2012: 40w (M) pregnancy - Delivery method: . Fetal weight: 0tn19nn. Complications: none     History of GDM or DM: No    History of GHTN or HTN: No    History of pre-eclampsia: No    Relationship with FOB:     Started taking prenatal vitamins in 2018    Desires first trimester dating ultrasound: yes    Desires second trimester fetal anatomy ultrasound: yes    Desires genetic testing for Down's Syndrome: yes, FH of Down syndrome. Allergies- reviewed:   No Known Allergies      Medications- reviewed:   Current Outpatient Prescriptions   Medication Sig    prenatal vit-iron fumarate-fa (PRENATAL PLUS WITH IRON) 28 mg iron- 800 mcg tab Take 1 Tab by mouth daily. No current facility-administered medications for this visit. Past Medical History- reviewed:  Past Medical History:   Diagnosis Date     delivery delivered          Past Surgical History- reviewed:   History reviewed. No pertinent surgical history. Social History- reviewed:  Social History     Social History    Marital status: SINGLE     Spouse name: N/A    Number of children: N/A    Years of education: N/A     Occupational History    Not on file.      Social History Main Topics    Smoking status: Never Smoker    Smokeless tobacco: Never Used    Alcohol use No    Drug use: No    Sexual activity: Yes     Partners: Male     Other Topics Concern    Not on file     Social History Narrative Immunizations- reviewed:   Immunization History   Administered Date(s) Administered    Influenza Vaccine (Quad) PF 01/29/2015     ROS  : Denies vaginal bleeding and leaking of fluid  GI: Endorses occasional nausea and vomiting      Objective:     Visit Vitals    /72    Pulse 73    Temp 98.1 °F (36.7 °C) (Oral)    Resp 16    Ht 5' 4\" (1.626 m)    Wt 58.2 kg (128 lb 3.2 oz)    LMP  (LMP Unknown)    SpO2 98%    BMI 22.01 kg/m2       Physical Exam:  GENERAL APPEARANCE: alert, well appearing, in no apparent distress  HEAD: normocephalic, atraumatic   LUNGS: clear to auscultation, no wheezes, rales or rhonchi, symmetric air entry  HEART: regular rate and rhythm, no murmurs  ABDOMEN: FHT present: 140  BACK: no CVA tenderness  EXTERNAL GENITALIA: normal appearing vulva with no masses, tenderness or lesions  VAGINA: no abnormal discharge or lesions  CERVIX: no lesions or cervical motion tenderness  UTERUS: fundal height: 27  EXTREMITIES: no redness or tenderness in the calves or thighs, no edema  NEUROLOGICAL: alert, oriented, normal speech, no focal findings or movement disorder noted  SKIN: normal coloration and turgor, no rashes    Exam chaperoned by [address] [place] [address],[address], [facility]    Labs:  No results found for this or any previous visit (from the past 12 hour(s)). Assessment:     She is at Unknown gestation. Unclear LMP 1/16/18 or 1/18/18. ICD-10-CM ICD-9-CM    1. H/O high risk medication treatment Z92.29 V67.51    2. Late prenatal care affecting pregnancy, antepartum O09.30 V23.7 CBC WITH AUTOMATED DIFF      HEP B SURFACE AG      T PALLIDUM SCREEN W/REFLEX      RUBELLA AB, IGG      BLOOD TYPE, (ABO+RH)      ANTIBODY SCREEN      CULTURE, URINE      VARICELLA-ZOSTER V AB, IGG      HIV 1/2 AG/AB, 4TH GENERATION,W RFLX CONFIRM      CHLAMYDIA/GC PCR      PAP IG,CT-NG, APTIMA HPV AND RFX 16/18,45 ([id number])   3. Normal intrauterine pregnancy, antepartum Z34.90 V22.1    4.  Normal pregnancy, unspecified trimester Z34.90 V22.1 AMB POC URINALYSIS DIP STICK AUTO W/O MICRO         Plan:   · Initial labs/specimens collected: Listed above  · Recommended prenatal vitamins  · Dating U/S today. · Request for 2nd trimester fetal anatomy ultrasound faxed to [facility]. · Genetic testing for Down Syndrome: Pt is past the GA for testing. · Follow-up in 2 weeks with Dr. Gautam Sandy. Orders Placed This Encounter    CULTURE, URINE    CBC WITH AUTOMATED DIFF    HEP B SURFACE AG    T PALLIDUM SCREEN W/REFLEX    RUBELLA AB, IGG    VARICELLA-ZOSTER V AB, IGG    HIV 1/2 AG/AB, 4TH GENERATION,W RFLX CONFIRM    CHLAMYDIA/GC PCR     Order Specific Question:   Sample source     Answer:   Swab [241]     Order Specific Question:   Specimen source     Answer:   Endocervix [350]    AMB POC URINALYSIS DIP STICK AUTO W/O MICRO    BLOOD TYPE, (ABO+RH)    ANTIBODY SCREEN    PAP IG,CT-NG, APTIMA HPV AND RFX 23/21,58 ([id number])     Order Specific Question:   Pap Source? Answer:   Endocervical     Order Specific Question:   Total Hysterectomy? Answer:   No     Order Specific Question:   Supracervical Hysterectomy? Answer:   No     Order Specific Question:   Post Menopausal?     Answer:   No     Order Specific Question:   Hormone Therapy? Answer:   No     Order Specific Question:   IUD? Answer:   No     Order Specific Question:   Abnormal Bleeding? Answer:   No     Order Specific Question:   Pregnant     Answer:   Yes     Order Specific Question:   Post Partum? Answer:   No         I have discussed the diagnosis with the patient and the intended plan as seen in the above orders. The patient has received an after-visit summary and questions were answered concerning future plans. I have discussed medication side effects and warnings with the patient as well. Informed pt to return to the office or go to the ER if she experiences vaginal bleeding, vaginal discharge, leaking of fluid, pelvic cramping.       Pt seen and discussed with Dr. Susan Weathers (attending physician)  Monica Marie MD  Family Medicine Resident    Dating Ultrasound Procedure Report    Theo Rodriguez is a 29 y.o. with pregnancy at Unknown by LMP here for dating ultrasound. Role of ultrasound in pregnancy discussed with patient. Patient consents to undergo dating ultrasound. [facility]  OFFICE PROCEDURE PROGRESS NOTE      Chart reviewed for the following:   I, Monica Marie MD, have reviewed the History, Physical and updated the Allergic reactions for [address] performed immediately prior to start of procedure:   Sharee Jenkins MD, have performed the following reviews on Theo Rodriguez prior to the start of the procedure:            * Patient was identified by name and date of birth   * Agreement on procedure being performed was verified  * Risks and Benefits explained to the patient  * Procedure site verified and marked as necessary  * Patient was positioned for comfort  * Consent was signed and verified     Time: 2:23 PM    Date of procedure: 5/10/2018    Procedure performed by:  Monica Marie MD    Provider assisted by: Dr. Susan Weathers MD    Patient assisted by: none    How tolerated by patient: tolerated the procedure well with no complications    Procedure in detail:    Ultrasound performed at bedside for evaluation of pregnancy. Ultrasound Type: Transabdominal  Findings: Single intrauterine pregnancy with EGA 25 weeks 2 days by [facility], HC, BPD and FL on today's ultrasound. Positive fetal movement, fetal heart beat present, normal appearing amiotic fluid, normal uterus, other maternal structures not visualized. MELL 8/19/18  Estimated Gestational Age by Ultrasound: 25 weeks 1 days  Fetal Position: Breech  Placenta: Fundal   Plan:    1. EGA: 25w and 2 d with MELL 8/19/18. Dr. Susan Weathers (attending) present for entire procedure.   Monica Marie MD; Family Medicine Resident

## 2018-05-10 LAB
BACTERIA UR CULT: NO GROWTH
BASOPHILS # BLD AUTO: 0 X10E3/UL (ref 0–0.2)
BASOPHILS NFR BLD AUTO: 0 %
C TRACH RRNA SPEC QL NAA+PROBE: NEGATIVE
C TRACH RRNA SPEC QL NAA+PROBE: NEGATIVE
EOSINOPHIL # BLD AUTO: 0.1 X10E3/UL (ref 0–0.4)
EOSINOPHIL NFR BLD AUTO: 1 %
ERYTHROCYTE [DISTWIDTH] IN BLOOD BY AUTOMATED COUNT: 13.9 % (ref 12.3–15.4)
HBV SURFACE AG SERPL QL IA: NEGATIVE
HCT VFR BLD AUTO: 33.8 % (ref 34–46.6)
HGB BLD-MCNC: 11.4 G/DL (ref 11.1–15.9)
HIV 1+2 AB+HIV1 P24 AG SERPL QL IA: NON REACTIVE
IMM GRANULOCYTES # BLD: 0.1 X10E3/UL (ref 0–0.1)
IMM GRANULOCYTES NFR BLD: 1 %
LYMPHOCYTES # BLD AUTO: 2 X10E3/UL (ref 0.7–3.1)
LYMPHOCYTES NFR BLD AUTO: 21 %
MCH RBC QN AUTO: 30.4 PG (ref 26.6–33)
MCHC RBC AUTO-ENTMCNC: 33.7 G/DL (ref 31.5–35.7)
MCV RBC AUTO: 90 FL (ref 79–97)
MONOCYTES # BLD AUTO: 0.7 X10E3/UL (ref 0.1–0.9)
MONOCYTES NFR BLD AUTO: 7 %
N GONORRHOEA RRNA SPEC QL NAA+PROBE: NEGATIVE
N GONORRHOEA RRNA SPEC QL NAA+PROBE: NEGATIVE
NEUTROPHILS # BLD AUTO: 6.8 X10E3/UL (ref 1.4–7)
NEUTROPHILS NFR BLD AUTO: 70 %
PLATELET # BLD AUTO: 243 X10E3/UL (ref 150–379)
RBC # BLD AUTO: 3.75 X10E6/UL (ref 3.77–5.28)
RUBV IGG SERPL IA-ACNC: 5.22 INDEX
SPECIMEN SOURCE: NORMAL
T PALLIDUM AB SER QL IA: NEGATIVE
WBC # BLD AUTO: 9.6 X10E3/UL (ref 3.4–10.8)

## 2018-05-14 ENCOUNTER — HOSPITAL ENCOUNTER (OUTPATIENT)
Dept: PERINATAL CARE | Age: 33
Discharge: HOME | End: 2018-05-14
Attending: OBSTETRICS & GYNECOLOGY
Payer: SELF-PAY

## 2018-05-14 PROCEDURE — 76811 OB US DETAILED SNGL FETUS: CPT | Performed by: OBSTETRICS & GYNECOLOGY

## 2018-05-16 LAB
ABO GROUP BLD: NORMAL
BLD GP AB SCN SERPL QL: NEGATIVE
RH BLD: POSITIVE
SPECIMEN STATUS REPORT, ROLRST: NORMAL
VZV IGG SER IA-ACNC: 311 INDEX

## 2018-05-22 ENCOUNTER — OFFICE VISIT (OUTPATIENT)
Dept: FAMILY MEDICINE CLINIC | Age: 33
End: 2018-05-22

## 2018-05-22 DIAGNOSIS — Z71.89 COUNSELING AND COORDINATION OF CARE: Primary | ICD-10-CM

## 2018-05-23 ENCOUNTER — ROUTINE PRENATAL (OUTPATIENT)
Dept: FAMILY MEDICINE CLINIC | Age: 33
End: 2018-05-23

## 2018-05-23 VITALS
RESPIRATION RATE: 16 BRPM | HEIGHT: 64 IN | BODY MASS INDEX: 22.36 KG/M2 | TEMPERATURE: 98.3 F | OXYGEN SATURATION: 98 % | SYSTOLIC BLOOD PRESSURE: 120 MMHG | DIASTOLIC BLOOD PRESSURE: 73 MMHG | HEART RATE: 75 BPM | WEIGHT: 131 LBS

## 2018-05-23 DIAGNOSIS — J06.9 VIRAL URI: Primary | ICD-10-CM

## 2018-05-23 DIAGNOSIS — Z34.93 PREGNANT AND NOT YET DELIVERED, THIRD TRIMESTER: ICD-10-CM

## 2018-05-23 LAB
BILIRUB UR QL STRIP: NEGATIVE
GLUCOSE UR-MCNC: NEGATIVE MG/DL
KETONES P FAST UR STRIP-MCNC: NEGATIVE MG/DL
PH UR STRIP: 7 [PH] (ref 4.6–8)
PROT UR QL STRIP: NEGATIVE
SP GR UR STRIP: 1.03 (ref 1–1.03)
UA UROBILINOGEN AMB POC: NORMAL (ref 0.2–1)
URINALYSIS CLARITY POC: CLEAR
URINALYSIS COLOR POC: YELLOW
URINE BLOOD POC: NEGATIVE
URINE LEUKOCYTES POC: NEGATIVE
URINE NITRITES POC: NEGATIVE

## 2018-05-23 NOTE — PATIENT INSTRUCTIONS
Infección de las vías respiratorias altas (Camillia Pummel): Instrucciones de cuidado - [ Upper Respiratory Infection (Cold): Care Instructions ]  Instrucciones de cuidado    La infección de las vías respiratorias altas (o URI, por david siglas en inglés), es júnior infección de la [place], los senos paranasales o la garganta. Las URI se transmiten por la tos, los estornudos y el contacto directo. El resfriado común es el tipo más frecuente de URI. La gripe y las infecciones de los senos paranasales son otros tipos de URI. Rubia todas las URI son causadas por virus. Los antibióticos no las [place]. Sin embargo, usted puede tratar la mayoría de estas infecciones con cuidados en el hogar. [place] puede implicar beber muchos líquidos y boyd analgésicos (medicamentos para el dolor) de venta holly. Es probable que se sienta mejor al cabo de 4 a 10 días. El médico lo rebolledo revisado minuciosamente, abigail se pueden presentar problemas más tarde. Si nota algún problema o nuevos síntomas, busque tratamiento médico inmediatamente. La atención de seguimiento es júnior parte clave de mims tratamiento y seguridad. Asegúrese de hacer y acudir a todas las citas, y llame a mims médico si está teniendo problemas. También es júnior buena idea saber los resultados de los exámenes y mantener júnior lista de los medicamentos que elke. ¿Cómo puede cuidarse en el hogar? · Para prevenir la deshidratación, pretty abundantes líquidos, los suficientes atul para que mims orina sea de color amarillo andrés o transparente atul el agua. Opte por beber agua y otros líquidos miguel sin cafeína hasta que se sienta mejor. Si tiene [place] [facility] [employer], del corazón o del hígado y tiene que [place]'s líquidos, hable con mims médico antes de aumentar mims consumo. · [place] un analgésico de venta holly, atul acetaminofén (Tylenol), ibuprofeno (Advil, Motrin) o naproxeno (Aleve). Mary y siga todas las instrucciones de la [place].   · Antes de usar medicamentos para la tos y los resfriados, revise la etiqueta. Estos medicamentos podrían no ser seguros para los niños pequeños o las personas con ciertos problemas de [address] · Tenga cuidado cuando tome medicamentos de venta holly para el resfriado común o la gripe y Tylenol al [employer]. Muchos de estos medicamentos contienen acetaminofén, o sea, Tylenol. Mary las etiquetas para asegurarse de que no está tomando júnior dosis mayor que la recomendada. El exceso de acetaminofén (Tylenol) puede ser dañino. · Descanse lo suficiente. · No fume ni permita que otros fumen cerca de usted. Si necesita ayuda para dejar de fumar, hable con olson médico acerca de programas y medicamentos para dejar de fumar. Estos pueden aumentar david probabilidades de dejar el hábito para siempre. ¿Cuándo debe pedir ayuda? Llame al 911 en cualquier momento que considere que necesita atención de [place]. Por ejemplo, llame si:  ? · Tiene graves dificultades para respirar. ? Llame a olson médico ahora mismo o busque atención médica inmediata si:  ? · Le parece que está mucho más enfermo. ? · Tiene nueva o peor dificultad para respirar. ? · Tiene fiebre nueva o más justo. ? · Tiene un salpullido nuevo. ?Preste especial atención a los cambios en olson jong y asegúrese de comunicarse con olson médico si:  ? · Tiene síntomas nuevos, atul dolor de garganta, dolor de oídos o dolor de los senos paranasales. ? · Olson tos es más profunda o más frecuente que antes, especialmente si nota más mucosidad o un cambio en el color de la mucosidad. ? · No mejora atul se esperaba. ¿Dónde puede encontrar más información en inglés? Kamryn Pompa a http://jacy-rashida.info/. Jessica Bergman L163 en la búsqueda para aprender más acerca de \"Infección de las vías respiratorias altas (Aarti Diesel): Instrucciones de cuidado - [ Upper Respiratory Infection (Cold): Care Instructions ]. \"  Revisado: 12 [place], 2017  Versión del contenido: 11.4  © 7628-0431 Healthwise, Incorporated.  Shabbir Ewing instrucciones de cuidado fueron adaptadas bajo licencia por [facility] Connections (which disclaims liability or warranty for this information). Si usted tiene Brielle [place] afección médica o sobre estas instrucciones, siempre pregunte a mims profesional de jong. [facility], Incorporated niega toda garantía o responsabilidad por mims uso de esta información.

## 2018-05-23 NOTE — MR AVS SNAPSHOT
[address] 
[phone] Patient: Elissa Bahena MRN: [id number] Visit Information Izabela Wong y Earl Personal Médico Departamento Teléfono del Dep. Número de visita 2018 10:15 AM Rukhsana Nicholson MD [address] [place] [address] [phone] [phone] Upcoming Health Maintenance Date Due DTaP/Tdap/Td series (1 - Tdap) 2007 OB 3RD TRIMESTER TDAP 2018 Influenza Age 5 to Adult 2018 PAP AKA CERVICAL CYTOLOGY 2021 Alergias  Review Complete El: 2018 Por: Brittnee Johnson LPN A partir del:  2018 No Known Allergies Vacunas actuales [facility] Distance Influenza Vaccine (Quad) PF 2015 No revisadas esta visita You Were Diagnosed With   
  
 Lenice Fernandez Pregnant and not yet delivered, third trimester    -  Primary ICD-10-CM: Z34.93 
ICD-9-CM: V22.1 Partes vitales PS Pulso Temperatura Resp Wingina ( percentil de crecimiento) Peso (percentil de crecimiento) 120/73 75 98.3 °F (36.8 °C) (Oral) 16 5' 4\" (1.626 m) 131 lb (59.4 kg) LMP (última jigar) SpO2 BMI (IMC) Estado obstétrico Estatus de tabaquísmo (LMP Unknown) 98% 22.49 kg/m2 Pregnant Never Smoker Historial de signos vitales BMI and BSA Data Body Mass Index Body Surface Area  
 22.49 kg/m 2 1.64 m 2 [facility] Name Phone [id number] - Raghavendra [address] [phone] Olson lista de medicamentos actualizada Luiz Rowan actualizada 18 10:47 AM.  Warden Homans use olson lista de medicamentos más reciente. prenatal vit-iron fumarate-fa 28 mg iron- 800 mcg Tab También conocido atul:  PRENATAL PLUS with IRON Take 1 Tab by mouth daily. Hicimos lo siguiente AMB POC URINALYSIS DIP STICK AUTO W/O MICRO [92904 CPT(R)] Instrucciones para el Pac[place] Infección de las vías respiratorias altas (Triny Mary): Instrucciones de cuidado - [ Upper Respiratory Infection (Cold): Care Instructions ] Instrucciones de cuidado La infección de las vías respiratorias altas (o URI, por david siglas en inglés), es júnior infección de la [place], los senos paranasales o la garganta. Las URI se transmiten por la tos, los estornudos y el contacto directo. El resfriado común es el tipo más frecuente de URI. La gripe y las infecciones de los senos paranasales son otros tipos de URI. Rubia todas las URI son causadas por virus. Los antibióticos no las Magalys Angry. Sin embargo, usted puede tratar la mayoría de estas infecciones con cuidados en el hogar. Lutsen puede implicar beber muchos líquidos y boyd analgésicos (medicamentos para el dolor) de venta holly. Es probable que se sienta mejor al cabo de 4 a 10 días. El médico lo rebolledo revisado minuciosamente, abigail se pueden presentar problemas más tarde. Si nota algún problema o nuevos síntomas, busque tratamiento médico inmediatamente. La atención de seguimiento es júnior parte clave de mims tratamiento y seguridad. Asegúrese de hacer y acudir a todas las citas, y llame a mims médico si está teniendo problemas. También es júnior buena idea saber los resultados de los exámenes y mantener júnior lista de los medicamentos que elke. Cómo puede cuidarse en el hogar? · Para prevenir la deshidratación, pretty abundantes líquidos, los suficientes atul para que mims orina sea de color amarillo andrés o transparente atul el agua. Opte por beber agua y otros líquidos miguel sin cafeína hasta que se sienta mejor. Si tiene [place] [facility] [employer], del corazón o del hígado y tiene que Karen's líquidos, hable con mims médico antes de aumentar mims consumo.  
· Chamblee un analgésico de venta holly, atul acetaminofén (Tylenol), ibuprofeno (Advil, Motrin) o naproxeno (Aleve). Mary y siga todas las instrucciones de la [place]. · Antes de usar medicamentos para la tos y los resfriados, revise la [place]. Estos medicamentos podrían no ser seguros para los niños pequeños o las personas con ciertos problemas de [place]. · Tenga cuidado cuando tome medicamentos de venta holly para el resfriado común o la gripe y Tylenol al [employer]. Muchos de estos medicamentos contienen acetaminofén, o sea, Tylenol. Mary las etiquetas para asegurarse de que no está tomando júnior dosis mayor que la recomendada. El exceso de acetaminofén (Tylenol) puede ser dañino. · Descanse lo suficiente. · No fume ni permita que otros fumen cerca de usted. Si necesita ayuda para dejar de fumar, hable con mims médico acerca de programas y medicamentos para dejar de fumar. Estos pueden aumentar david probabilidades de dejar el hábito para siempre. Cuándo debe pedir ayuda? Llame al 911 en cualquier momento que considere que necesita atención de [place]. Por ejemplo, llame si: 
? · Tiene graves dificultades para respirar. ? Llame a mims médico ahora mismo o busque atención médica inmediata si: 
? · Le parece que está mucho más enfermo. ? · Tiene nueva o peor dificultad para respirar. ? · Tiene fiebre nueva o más justo. ? · Tiene un salpullido nuevo. ?Preste especial atención a los cambios en mims jong y asegúrese de comunicarse con mims médico si: 
? · Tiene síntomas nuevos, atul dolor de garganta, dolor de oídos o dolor de los senos paranasales. ? · Mims tos es más profunda o más frecuente que antes, especialmente si nota más mucosidad o un cambio en el color de la mucosidad. ? · No mejora atul se esperaba. Dónde puede encontrar más información en inglés? Cierra Damian a [email or web address] Urvashi Hendrix T355 en la búsqueda para aprender más acerca de \"Infección de las vías respiratorias altas (Ziggy Cross):  Instrucciones de cuidado - [ Orien Wisam Respiratory Infection (Cold): Care Instructions ]. \" 
Revisado: 12 ramos, 2017 Versión del contenido: 11.4 © 4072-1224 Healthwise, Incorporated. Las instrucciones de cuidado fueron adaptadas bajo licencia por Good Help Connections (which disclaims liability or warranty for this information). Si usted tiene [place] afección médica o sobre estas instrucciones, siempre pregunte a mims profesional de jong. Healthwise, Incorporated niega toda garantía o responsabilidad por mims uso de esta información. Introducing [facility] SERVICES! [facility] introduce portal paciente MyChart . Ahora se puede acceder a partes de mims expediente médico, enviar por correo electrónico la oficina de mims médico y solicitar renovaciones de medicamentos en línea. En mims navegador de Internet , Reina Novak a [email or web address] Silvio clic en el usuario por Leonel Joseph? Franco Lennox clic aquí en la sesión Herberth Lint. Verá la página de registro [place]. Ingrese mims código de [employer] aleks y atul aparece a continuación. Usted no tendrá que [employer] código después de ba completado el proceso de registro . Si usted no se inscribe antes de la fecha de caducidad , debe solicitar un nuevo código. · MyChart Código de acceso : [id number] Expires: 7/31/2018 10:03 AM 
 
Ingresa los últimos cuatro dígitos de mims Número de Seguro Social ( xxxx ) y fecha de nacimiento ( dd / mm / aaaa ) atul se indica y silvio clic en Enviar. Usted será llevado a la siguiente página de registro . Crear un ID MyChart . Esta será mims ID de inicio de sesión de MyChart y no puede ser [place] , por lo que pensar en júnior que es Tania Glimpse y fácil de recordar . Crear júnior contraseña MyChart . Usted puede cambiar mims contraseña en cualquier momento . Ingrese mims Password Reset de preguntas y [place] . [place] se puede utilizar en un momento posterior si usted olvida mims contraseña. Introduzca mims dirección de correo electrónico . Power Nephew recibirá júnior notificación por correo electrónico cuando la nueva información está disponible en MyChart . Claudene Loach clic en Registrarse. Bibiana Jody rustam y descargar porciones de mims expediente médico. 
Nell clweston en el enlace de descarga del menú Resumen para descargar júnior copia portátil de mims información médica . Si tiene [employer] , por favor visite la sección de preguntas frecuentes del sitio web MyChart . Recuerde, Sylvan Sourcehart NO es que se utilizará para las necesidades urgentes. Para emergencias médicas , llame al 911 . Ahora disponible en mims iPhone y Android ! Por favor proporcione herve resumen de la documentación de cuidado a mims próximo proveedor. Your primary care clinician is listed as Anmol Zurita. If you have any questions after today's visit, please call [phone].

## 2018-05-26 NOTE — PROGRESS NOTES
Sick Visit in a pregnant patient       Subjective:   Samia Burger 29 y.o.   MELL: 2018, Alternate MELL Entry  GA:  27w6d. She presents for a sick visit. She has had mild runny nose and mild non productive cough for 2 days. She has not taken anything for it. She wants to know what she should take. No fevers. No abdominal pain, discharge of fluid, vaginal bleeding. +FM. Allergies- reviewed:   No Known Allergies      Medications- reviewed:   Current Outpatient Prescriptions   Medication Sig    prenatal vit-iron fumarate-fa (PRENATAL PLUS WITH IRON) 28 mg iron- 800 mcg tab Take 1 Tab by mouth daily. No current facility-administered medications for this visit. Past Medical History- reviewed:  Past Medical History:   Diagnosis Date     delivery delivered        Objective:     Visit Vitals    /73    Pulse 75    Temp 98.3 °F (36.8 °C) (Oral)    Resp 16    Ht 5' 4\" (1.626 m)    Wt 131 lb (59.4 kg)    LMP  (LMP Unknown)    SpO2 98%    BMI 22.49 kg/m2       Physical Exam:  GENERAL APPEARANCE: alert, well appearing  LUNGS: clear to auscultation, no wheezes, rales or rhonchi, symmetric air entry  HEART: regular rate and rhythm, no murmurs    UA was normal.    Assessment   Pregnancy at  27w6d       ICD-10-CM ICD-9-CM    1. Viral URI J06.9 465.9    2. Pregnant and not yet delivered, third trimester Z34.93 V22.1 AMB POC URINALYSIS DIP STICK AUTO W/O MICRO         Plan   · Conservative measures for cold. Steam, tea w honey, rest.  Tylenol and claritin, OK, avoid ibuprofen, phenylephrine, or pseudoephedrine.     · Follow up in 1 week for return OB visit      Orders Placed This Encounter    AMB POC URINALYSIS DIP STICK AUTO W/O MICRO         Ronnell Larkin MD  Family Medicine Resident

## 2018-06-04 ENCOUNTER — ROUTINE PRENATAL (OUTPATIENT)
Dept: FAMILY MEDICINE CLINIC | Age: 33
End: 2018-06-04

## 2018-06-04 VITALS
OXYGEN SATURATION: 98 % | SYSTOLIC BLOOD PRESSURE: 124 MMHG | HEART RATE: 80 BPM | DIASTOLIC BLOOD PRESSURE: 84 MMHG | HEIGHT: 64 IN | WEIGHT: 132 LBS | TEMPERATURE: 98 F | RESPIRATION RATE: 16 BRPM | BODY MASS INDEX: 22.53 KG/M2

## 2018-06-04 DIAGNOSIS — O34.219 HX SUCCESSFUL VBAC (VAGINAL BIRTH AFTER CESAREAN), CURRENTLY PREGNANT: ICD-10-CM

## 2018-06-04 DIAGNOSIS — O40.3XX0 POLYHYDRAMNIOS IN THIRD TRIMESTER COMPLICATION, SINGLE OR UNSPECIFIED FETUS: ICD-10-CM

## 2018-06-04 DIAGNOSIS — O09.33 LATE PRENATAL CARE AFFECTING PREGNANCY IN THIRD TRIMESTER: ICD-10-CM

## 2018-06-04 DIAGNOSIS — Z3A.29 29 WEEKS GESTATION OF PREGNANCY: Primary | ICD-10-CM

## 2018-06-04 DIAGNOSIS — Z98.891 HISTORY OF C-SECTION: ICD-10-CM

## 2018-06-04 LAB
BILIRUB UR QL STRIP: NEGATIVE
GLUCOSE UR-MCNC: NEGATIVE MG/DL
KETONES P FAST UR STRIP-MCNC: NEGATIVE MG/DL
PH UR STRIP: 6.5 [PH] (ref 4.6–8)
PROT UR QL STRIP: NEGATIVE
SP GR UR STRIP: 1.02 (ref 1–1.03)
UA UROBILINOGEN AMB POC: NORMAL (ref 0.2–1)
URINALYSIS CLARITY POC: CLEAR
URINALYSIS COLOR POC: YELLOW
URINE BLOOD POC: NEGATIVE
URINE LEUKOCYTES POC: NORMAL
URINE NITRITES POC: NEGATIVE

## 2018-06-04 NOTE — PROGRESS NOTES
I reviewed with the resident the medical history and the resident's findings on the physical examination. I discussed with the resident the patient's diagnosis and concur with the plan. Jenelle Leiva is a 29 y.o. female  at 28w2d. presents for routine PNC. 2018, Alternate MELL Entry (US at 25 weeks)  Concerns addressed: Routine PNC, glucola and Tdap. Pregnancy complicated by:Late PNC and C-Sectionx1, VBACx1. Desire for TOLAC. Polyhydramnios on last U/S, has follow-up U/S scheduled. Denies VB, LOF, Contractions. + Fetal movement. Weight gain reviewed.   RTC in 2 weeks    Visit Vitals    /84 (BP 1 Location: Left arm, BP Patient Position: Sitting)    Pulse 80    Temp 98 °F (36.7 °C) (Oral)    Resp 16    Ht 5' 4\" (1.626 m)    Wt 132 lb (59.9 kg)    LMP  (LMP Unknown)    SpO2 98%    BMI 22.66 kg/m2     FHT: 140s  FH: 29cm    Recent Results (from the past 24 hour(s))   AMB POC URINALYSIS DIP STICK AUTO W/O MICRO    Collection Time: 18  3:35 PM   Result Value Ref Range    Color (UA POC) Yellow     Clarity (UA POC) Clear     Glucose (UA POC) Negative Negative    Bilirubin (UA POC) Negative Negative    Ketones (UA POC) Negative Negative    Specific gravity (UA POC) 1.020 1.001 - 1.035    Blood (UA POC) Negative Negative    pH (UA POC) 6.5 4.6 - 8.0    Protein (UA POC) Negative Negative    Urobilinogen (UA POC) 0.2 mg/dL 0.2 - 1    Nitrites (UA POC) Negative Negative    Leukocyte esterase (UA POC) 1+ Negative

## 2018-06-04 NOTE — PATIENT INSTRUCTIONS
Semanas 26 a 30 de olson embarazo: Instrucciones de cuidado - [ Lux Antu 26 to 30 of Your Pregnancy: Care Instructions ]  Instrucciones de cuidado    Ahora usted se encuentra en el último trimestre del Candi Kamryn. Olson bebé está creciendo rápidamente. Y es probable que sienta que olson bebé se mueve con más frecuencia. Es posible que olson médico le diga que cuente la cantidad de patadas que da olson bebé. La espalda puede dolerle mientras olson cuerpo se acostumbra al tamaño y a la longitud de olson bebé. Si todavía no le tracy aplicado la vacuna Tdap (tétanos, difteria y tos [place]) anthony herve Candi [place], hable con olson médico acerca de aplicársela. Wilromerominia Kevan a proteger a olson recién nacido contra la infección por tos ferina. Anthony hreve tiempo, es importante que se cuide y preste atención a lo que olson cuerpo necesita. Si tiene [employer], busque formas de estar con olson stefano que satisfagan olson nivel de comodidad y deseo. Utilice las recomendaciones proporcionadas en esta hoja de cuidados para encontrar olson propia manera de vivir la sexualidad. La atención de seguimiento es júnior parte clave de olson tratamiento y seguridad. Asegúrese de hacer y acudir a todas las citas, y llame a olson médico si está teniendo problemas. También es júnior buena idea saber los resultados de los exámenes y mantener júnior lista de los medicamentos que elke. ¿Cómo puede cuidarse en el hogar? [place] olson trabajo con calma  · Tómese descansos frecuentes. Si es posible, deje de trabajar cuando esté cansada y descanse anthony la hora del almuerzo. · Vaya al baño cada 2 horas. · Cambie de posición con frecuencia. Si está sentada anthony mucho tiempo, póngase de pie y camine. · Si está alba anthony mucho tiempo, apoye un pie sobre un banco bajo, flexionando la rodilla. Después de estar alba anthony mucho tiempo, siéntese con los pies elevados.   · Evite las [place], las sustancias químicas y el humo del tabaco.  Viva olson sexualidad a olson manera  · [employer] sexuales anthony el [employer], a menos que mims médico le diga que no. · Podría tener un gran deseo sexual o estar completamente desinteresada. · El abdomen cada vez más gonzales podría hacer difícil encontrar júnior buena posición anthony el coito. Experimente y explore. · Cuando mims stefano le toque los senos, podría tener contracciones en [place]. · Un masaje en la espalda podría aliviar el dolor de espalda o los cólicos que a veces se sienten después del [place]. Aprenda sobre el trabajo de parto prematuro  · Esté alerta a las señales del trabajo de parto prematuro. Es posible que esté comenzando el [place] de parto si:  ¨ Tiene cólicos similares a los del período menstrual, con o sin náuseas. ¨ Tiene aproximadamente 4 contracciones o más en 20 minutos, o alrededor de 8 o más en 1 hora, incluso después de ba tomado un vaso de agua y estar en reposo. ¨ Tiene un dolor sordo (leve abigail continuo) en la chary baja de la espalda que no desaparece cuando cambia de posición. ¨ Siente dolor o presión en la pelvis que aparece y desaparece con determinada regularidad. ¨ Tiene espasmos intestinales o síntomas similares a los de la gripe, con o sin diarrea. ¨ Nota un aumento o un cambio en el flujo vaginal. El flujo podría ser Leif Bruce, tener consistencia mucosa, ser Hector Glance rastros de [employer]. ¨ Se le rompe la marcell. · Si marky que ha comenzado un trabajo de parto prematuro:  ¨ Steffanie 2 o 3 vasos de [place] o jugo. No beber suficientes líquidos puede provocar contracciones. ¨ Detenga lo que esté haciendo, y vacíe la vejiga. Luego, acuéstese sobre el lado aurelio anthony al menos 1 hora. ¨ Mientras descansa de costado, ubique mims esternón. Coloque los dedos en el punto blando sofiya debajo de él. Mueva los dedos hacia abajo en dirección al ombligo para encontrar la parte superior del [place]. Compruebe si está tenso. ¨ Las contracciones pueden ser débiles o intensas.  Registre david contracciones anthony Kirk hora. Neelam Sheller contracción desde el comienzo de júnior hasta el comienzo de [place]. ¨ Júnior o varias contracciones emiliano que no ocurren con la regularidad de un patrón reciben el nombre de contracciones de [place]. Estas son \"contracciones de práctica\", abigail no indican el inicio del [place] de [place]. Por lo general, se detienen si cambia de [place]. ¨ Si tiene contracciones regulares, llame a olson médico.  ¿Dónde puede encontrar más información en inglés? Hannah Curiel a [email or web address] Love Kerr U726 en la búsqueda para aprender más acerca de \"Semanas 26 a 30 de olson embarazo: Instrucciones de cuidado - [ Deejay Kel 26 to 30 of Your Pregnancy: Care Instructions ]. \"  Revisado: 16 marzo, 2017  Versión del contenido: 11.4  © 1055-4883 Healthwise, Incorporated. Las instrucciones de cuidado fueron adaptadas bajo licencia por Good Help Connections (which disclaims liability or warranty for this information). Si usted tiene [place] afección médica o sobre estas instrucciones, siempre pregunte a olson profesional de jong. Healthwise, Incorporated niega toda garantía o responsabilidad por olson uso de esta información. Ramonita Given a olson médico anthony el embarazo (después de 20 semanas) - [ Learning About When to Call Your Doctor During Pregnancy (After 20 Weeks) ]  Instrucciones de cuidado  Es normal que tenga inquietudes acerca de lo que podría ser un problema anthony el Jodie Coffee. Aunque la mayoría de las mujeres embarazadas no tienen ningún problema grave, es importante saber cuándo llamar a olson médico si tiene determinados síntomas o señales de trabajo de [place]. Estas son algunas sugerencias generales. Olson médico puede darle más información sobre cuándo llamar. Cuándo llamar a olson médico (después de 20 semanas)  Llame al 911 en cualquier momento que sospeche que puede necesitar atención de [place]. Por ejemplo, llame si:  · Tiene sangrado vaginal intenso.   · Tiene dolor repentino e intenso en el abdomen. · Se desmayó (perdió el conocimiento). · Tiene júnior convulsión. · Ve o siente el cordón umbilical.  · Grace que está a punto de dahlia a kemar a mims bebé y no puede llegar en forma jaramillo al hospital.  Marlene Collar a mims médico ahora mismo o busque atención médica inmediata si:  · Tiene sangrado vaginal.  · Tiene dolor en el abdomen. · Tiene fiebre. · Tiene síntomas de preeclampsia, tales atul:  ¨ Hinchazón repentina de la sol, las ines o los pies. ¨ Problemas nuevos con la visión (atul oscurecimiento de la visión o visión borrosa). ¨ Dolor de ari intenso. · Tiene júnior pérdida repentina de líquido por la vagina. (Piensa que rompió la marcell). · Grace que puede estar en [place]. Graf significa que usted ha tenido al menos 4 contracciones en 20 minutos o al menos 8 contracciones en [employer]. · Nota que mims bebé ha dejado de moverse o lo hace mucho menos de lo habitual.  · Tiene síntomas de júnior infección del tracto urinario. Estos pueden incluir:  ¨ Dolor o ardor al orinar. ¨ Necesidad de orinar con frecuencia sin poder eliminar mucha orina. ¨ Dolor en el flanco, que se encuentra sofiya debajo de la caja torácica y [place] de la cintura en un lado de la espalda. ¨ Mandeep en la orina. Preste especial atención a los cambios en mims jong y asegúrese de comunicarse con mims médico si:  · Tiene flujo vaginal con un olor desagradable. · Tiene cambios en la piel, tales atul:  ¨ Salpullido. ¨ Comezón. ¨ Color amarillento en la piel. · Tiene otras inquietudes acerca de mims embarazo. Si tiene signos de trabajo de parto al llegar a las [address] o más  Si tiene señales de [place] de parto a las 37 semanas o [facility] or[place], es posible que mims médico le diga que llame cuando mims trabajo de parto se vuelva más [place]. Los síntomas del trabajo de parto activo incluyen:  · Contracciones que son regulares. · Contracciones a intervalos de menos de 5 minutos.   · Contracciones anthony las cuales es difícil hablar. La atención de seguimiento es júnior parte clave de mims tratamiento y seguridad. Asegúrese de hacer y acudir a todas las citas, y llame a mims médico si está teniendo problemas. También es júnior buena idea saber los resultados de los exámenes y mantener júnior lista de los medicamentos que elke. ¿Dónde puede encontrar más información en inglés? Sandoval Dooley a [email or web address] Escriba T845 en la búsqueda para aprender más acerca de \"Aprenda cuándo llamar a mims médico anthony el embarazo (después de 20 semanas) - [ Learning About When to Call Your Doctor During Pregnancy (After 20 Weeks) ]. \"  Revisado: 16 marzo, 2017  Versión del contenido: 11.4  © 6168-1182 Healthwise, Incorporated. Las instrucciones de cuidado fueron adaptadas bajo licencia por Good motify Connections (which disclaims liability or warranty for this information). Si usted tiene [place] afección médica o sobre estas instrucciones, siempre pregunte a mims profesional de jong. Healthwise, Incorporated niega toda garantía o responsabilidad por mims uso de esta información.

## 2018-06-04 NOTE — PROGRESS NOTES
Return OB Visit       Subjective:   Mili Rivas 29 y.o.  at 29w1d by [address] at 25w. MELL: 2018. Pregnancy complicated by late to prenatal care, hx of csection and successful . MFM US concerning for polyhydramnios. States she is doing well and does not have questions or concerns. + fetal movement. No fluid loss or vaginal bleeding. She is taking PNV and she is eating healthy. Patient required use of , but preferred her friend to translate. Allergies- reviewed:   No Known Allergies      Medications- reviewed:   Current Outpatient Prescriptions   Medication Sig    prenatal vit-iron fumarate-fa (PRENATAL PLUS WITH IRON) 28 mg iron- 800 mcg tab Take 1 Tab by mouth daily. No current facility-administered medications for this visit. Past Medical History- reviewed:  Past Medical History:   Diagnosis Date     delivery delivered          Past Surgical History- reviewed:   No past surgical history on file. Social History- reviewed:  Social History     Social History    Marital status:      Spouse name: N/A    Number of children: N/A    Years of education: N/A     Occupational History    Not on file.      Social History Main Topics    Smoking status: Never Smoker    Smokeless tobacco: Never Used    Alcohol use No    Drug use: No    Sexual activity: Yes     Partners: Male     Other Topics Concern    Not on file     Social History Narrative         Immunizations- reviewed:   Immunization History   Administered Date(s) Administered    Influenza Vaccine (Quad) PF 2015         Objective:     Visit Vitals    /84 (BP 1 Location: Left arm, BP Patient Position: Sitting)    Pulse 80    Temp 98 °F (36.7 °C) (Oral)    Resp 16    Ht 5' 4\" (1.626 m)    Wt 132 lb (59.9 kg)    LMP  (LMP Unknown)    SpO2 98%    BMI 22.66 kg/m2       Physical Exam:  GENERAL APPEARANCE: alert, well appearing, in no apparent distress  LUNGS: clear to auscultation, no wheezes, rales or rhonchi, symmetric air entry  HEART: regular rate and rhythm, no murmurs  ABDOMEN: gravid, fundal height 29 cm, FHT present at 140s bpm  EXTREMITIES: no redness or tenderness in the calves or thighs. Trace BLE edema   NEUROLOGICAL: alert, oriented, normal speech, no focal findings or movement disorder noted      Labs  Recent Results (from the past 12 hour(s))   AMB POC URINALYSIS DIP STICK AUTO W/O MICRO    Collection Time: 18  3:35 PM   Result Value Ref Range    Color (UA POC) Yellow     Clarity (UA POC) Clear     Glucose (UA POC) Negative Negative    Bilirubin (UA POC) Negative Negative    Ketones (UA POC) Negative Negative    Specific gravity (UA POC) 1.020 1.001 - 1.035    Blood (UA POC) Negative Negative    pH (UA POC) 6.5 4.6 - 8.0    Protein (UA POC) Negative Negative    Urobilinogen (UA POC) 0.2 mg/dL 0.2 - 1    Nitrites (UA POC) Negative Negative    Leukocyte esterase (UA POC) 1+ Negative         Assessment   28 y/o  at  29w1d by US at 25w. Pregnancy complicated by late to prenatal care and hx of csection with successful . MFM US concerning for polyhydramnios. ICD-10-CM ICD-9-CM    1. 29 weeks gestation of pregnancy Z3A.29 V22.2 AMB POC URINALYSIS DIP STICK AUTO W/O MICRO      GLUCOSE, GESTATIONAL 1 HR TOLERANCE      TETANUS, DIPHTHERIA TOXOIDS AND ACELLULAR PERTUSSIS VACCINE (TDAP), IN INDIVIDS. >=7, IM   2. Late prenatal care affecting pregnancy in third trimester O09.33 V23.7    3. History of  Z98.891 V45.89    4. Hx successful  (vaginal birth after ), currently pregnant O31.200 654.20    5. Polyhydramnios in third trimester complication, single or unspecified fetus O40. 3XX0 657.03          Plan   · SIUP at 29w1d by US at 25w  · PNL: O positive, Ab neg, HbsAg neg, VZV/ Rubella immune, HIV nonreactive, GC/ Chlamydia neg, T pall neg.    · 1 hour GTT obtained today   · Tdap today  · MFM US   · Cephalic presentation, mild polyhydramnios with IVON 26.4, normal cord, Posterior placenta. Fetal anatomy normal; incidental L sided intracardiac echogenic focus. · Scheduled for ultrasound for fetal growth and amniotic fluid assessment in 6-8 weeks. Patient confirmed it is scheduled already   · Patient interested in TOLAC  · Discussed risks of TOLAC with patient including uterine rupture and patient accepts risks. · Continue routine prenatal care  · Follow up in 2 weeks for return OB visit. Scheduled with me 6/18 at 3:45 PM      Orders Placed This Encounter    TETANUS, DIPHTHERIA TOXOIDS AND ACELLULAR PERTUSSIS VACCINE (TDAP), IN INDIVIDS. >=7, IM    GLUCOSE, GESTATIONAL 1 HR TOLERANCE    AMB POC URINALYSIS DIP STICK AUTO W/O MICRO         Labor precautions discussed, including: Regular painful contractions, lasting for greater than one hour, taking your breath away; any vaginal bleeding; any leakage of fluid; or absent or decreased fetal movement. Call M.D. on call if any of these symptoms or signs occur. I have discussed the diagnosis with the patient and the intended plan as seen in the above orders. Patient verbalizes understanding of the treatment plan and agrees with the plan. The patient has received an after-visit summary and questions were answered concerning future plans. I have discussed medication side effects and warnings with the patient as well. Informed pt to return to the office or go to the ER if she experiences vaginal bleeding, vaginal discharge, leaking of fluid, pelvic cramping.       Pt seen and discussed with Dr. Marylu Sifuentes (attending physician)    Maximiliano Zhu MD  Family Medicine Resident

## 2018-06-04 NOTE — MR AVS SNAPSHOT
[address] 
[phone] Patient: Rudy Sanchez MRN: [id number] NGL:[id number] Visit Information Monetta Gottron y Earl Personal Médico Departamento Teléfono del Dep. Número de visita 6/4/2018  3:25 PM Smita [facility] [phone]  
  
 6/18/2018  3:45 PM  
OB VISIT with Smita Herr MD  
Eliud [facility]) Appt Note: ob visit [address] [employer] [address]  
[phone]  
  
   
 [address] [employer] [facility] [id number] Upcoming Health Maintenance Date Due DTaP/Tdap/Td series (1 - Tdap) 1/19/2007 OB 3RD TRIMESTER TDAP 5/20/2018 Influenza Age 5 to Adult 8/1/2018 PAP AKA CERVICAL CYTOLOGY 5/8/2021 Alergias  Review Complete El: 5/26/2018 Por: MD Citlaly Brand del:  6/4/2018 No Known Allergies Vacunas actuales [place] Mean Vickye Ramp Influenza Vaccine (Quad) PF 1/29/2015 Tdap  Incomplete No revisadas esta visita You Were Diagnosed With   
  
 Fermin Santoyo 29 weeks gestation of pregnancy    -  Primary ICD-10-CM: Z3A.29 ICD-9-CM: V22.2 Partes vitales PS Pulso Temperatura Resp [place] ( percentil de crecimiento) Peso (percentil de crecimiento) 124/84 (BP 1 Location: Left arm, BP Patient Position: Sitting) 80 98 °F (36.7 °C) (Oral) 16 5' 4\" (1.626 m) 132 lb (59.9 kg) LMP (última jigar) SpO2 BMI (IMC) Estado obstétrico Estatus de tabaquísmo (LMP Unknown) 98% 22.66 kg/m2 Pregnant Never Smoker Historial de signos vitales BMI and BSA Data Body Mass Index Body Surface Area  
 22.66 kg/m 2 1.64 m 2 [facility] Name Phone [id number] Aldair [facility] 23 [phone] Olson lista de medicamentos actualizada Ben Ryleye actualizada 6/4/18  3:54 PM.  Nimisha Prader use mims lista de medicamentos más reciente. prenatal vit-iron fumarate-fa 28 mg iron- 800 mcg Tab También conocido atul:  PRENATAL PLUS with IRON Take 1 Tab by mouth daily. Hicimos lo siguiente AMB POC URINALYSIS DIP STICK AUTO W/O MICRO [05849 CPT(R)] GLUCOSE, GESTATIONAL 1 HR TOLERANCE [16980 CPT(R)] TETANUS, DIPHTHERIA TOXOIDS AND ACELLULAR PERTUSSIS VACCINE (TDAP), IN INDIVIDS. >=7, IM [id number] CPT(R)] Instrucciones para el Paciente Semanas 26 a 30 de mims embarazo: Instrucciones de cuidado - [ Lanetta Cone 26 to 30 of Your Pregnancy: Care Instructions ] Instrucciones de cuidado Ahora usted se encuentra en el último trimestre del [facility]. Mims bebé está creciendo rápidamente. Y es probable que sienta que mims bebé se mueve con más frecuencia. Es posible que mims médico le diga que cuente la cantidad de patadas que da mims bebé. La espalda puede dolerle mientras mims cuerpo se acostumbra al tamaño y a la longitud de mims bebé. Si todavía no le tracy aplicado la vacuna Tdap (tétanos, difteria y tos [place]) anthony herve [facility], hable con mims médico acerca de aplicársela. Dimple Cooler a proteger a mims recién nacido contra la infección por tos ferina. Anthony herve tiempo, es importante que se cuide y preste atención a lo que mims cuerpo necesita. Si tiene [employer], busque formas de estar con mims stefano que satisfagan mims nivel de comodidad y deseo. Utilice las recomendaciones proporcionadas en esta hoja de cuidados para encontrar imms propia manera de vivir la sexualidad. La atención de seguimiento es júnior parte clave de mims tratamiento y seguridad. Asegúrese de hacer y acudir a todas las citas, y llame a mims médico si está teniendo problemas. También es júnior buena idea saber los resultados de los exámenes y mantener júnior lista de los medicamentos que elke. Cómo puede cuidarse en el hogar? [place] mims trabajo con calma · Tómese descansos frecuentes. Si es posible, deje de trabajar cuando esté cansada y descanse anthony la hora del almuerzo. · Vaya al baño cada 2 horas. · Cambie de posición con frecuencia. Si está sentada anthony mucho tiempo, póngase de pie y camine. · Si está alba anthony mucho tiempo, apoye un pie sobre un banco bajo, flexionando la rodilla. Después de estar alba anthony mucho tiempo, siéntese con los pies elevados. · [address], las sustancias químicas y el humo del tabaco. 
Leellen Minion mims sexualidad a mims Dulce Laughter · [employer] sexuales anthony el embarazo está heide, a menos que mims médico le diga que no. · Podría tener un gran deseo sexual o estar completamente desinteresada. · El abdomen cada vez más gonzales podría hacer difícil encontrar júnior buena posición anthony el coito. Experimente y explore. · Cuando mims stefano le toque los senos, podría tener contracciones en [place]. · Un masaje en la espalda podría aliviar el dolor de espalda o los cólicos que a veces se sienten después del [place]. Aprenda sobre el trabajo de [place] prematuro · Esté alerta a las señales del trabajo de [place]. Es posible que esté comenzando el [place] de parto si: ¨ Tiene cólicos similares a los del período menstrual, con o sin náuseas. ¨ Tiene aproximadamente 4 contracciones o más en 20 minutos, o alrededor de 8 o más en 1 hora, incluso después de ba tomado un vaso de agua y estar en reposo. ¨ Tiene un dolor sordo (leve abigail continuo) en la chary baja de la espalda que no desaparece cuando cambia de posición. ¨ Siente dolor o presión en la pelvis que aparece y desaparece con determinada regularidad. ¨ Tiene espasmos intestinales o síntomas similares a los de la gripe, con o sin diarrea. ¨ Nota un aumento o un cambio en el flujo vaginal. El flujo podría ser Paralee Domingo, tener consistencia mucosa, ser Noé Eduin rastros de [place]. ¨ Se le rompe la marcell. · Si marky que ha comenzado un trabajo de [place] prematuro: ¨ Steffanie 2 o 3 vasos de [place]. No beber suficientes líquidos puede provocar contracciones. ¨ Detenga lo que esté haciendo, y vacíe la vejiga. Luego, acuéstese sobre el lado aurelio anthony al menos 1 hora. ¨ Mientras descansa de costado, ubique mims esternón. Coloque los dedos en el punto blando sofiya debajo de él. Mueva los dedos hacia abajo en dirección al ombligo para encontrar la parte superior del [place]. Compruebe si está tenso. ¨ Las contracciones pueden ser débiles o intensas. Registre david contracciones anthony júnior hora. Cuente júnior contracción desde el comienzo de júnior hasta el comienzo de [place]. ¨ Júnior o varias contracciones emiliano que no ocurren con la regularidad de un patrón reciben el nombre de contracciones de [place]. Estas son \"contracciones de práctica\", abigail no indican el inicio del [facility] de Raf[place]. Por lo general, se detienen si cambia de [place]. ¨ Si tiene contracciones regulares, llame a mims médico. 

Dónde puede encontrar más información en inglés? [place] a [email or web address].[email or web address] Urvashi Hendrix F894 en la búsqueda para aprender más acerca de \"Semanas 26 a 30 de mims embarazo: Instrucciones de cuidado - [ Blinda Punt 26 to 30 of Your Pregnancy: Care Instructions ]. \" 
Revisado: 16 marzo, 2017 Versión del contenido: 11.4 © 6169-2371 Healthwise, Incorporated. Las instrucciones de cuidado fueron adaptadas bajo licencia por [employer] Help [employer]ctions (which disclaims liability or warranty for this information). Si usted tiene [place] afección médica o sobre estas instrucciones, siempre pregunte a mims profesional de jong. [facility]orporated niega toda garantía o responsabilidad por mims uso de esta información. Aprenda cuándo llamar a mims médico anthony el embarazo (después de 20 semanas) - [ Learning About When to Call Your Doctor During Pregnancy (After 20 Weeks) ] Instrucciones de cuidado Es normal que tenga inquietudes acerca de lo que podría ser un problema anthony el [facility]. Aunque la mayoría de las mujeres embarazadas no tienen ningún problema grave, es importante saber cuándo llamar a mims médico si tiene determinados síntomas o señales de trabajo de [place]. Estas son algunas sugerencias generales. Mims médico puede darle más información sobre cuándo llamar. Cuándo llamar a mims médico (después de 20 semanas) Llame al 911 en cualquier momento que sospeche que puede necesitar atención de [place]. Por ejemplo, llame si: · Tiene sangrado vaginal intenso. · Tiene dolor repentino e intenso en el abdomen. · Se desmayó (perdió el conocimiento). · Tiene júnior convulsión. · Ve o siente el cordón umbilical. 
· Grace que está a punto de dahlia a kemar a mims bebé y no puede llegar en forma jaramillo al hospital. 
Jayson Kevin a mims médico ahora mismo o busque atención médica inmediata si: · Tiene sangrado vaginal. 
· Tiene dolor en el abdomen. · Tiene fiebre. · Tiene síntomas de preeclampsia, tales atul: 
¨ Hinchazón repentina de la sol, las ines o los pies. ¨ Problemas nuevos con la visión (atul oscurecimiento de la visión o visión borrosa). ¨ Dolor de ari intenso. · Tiene júnior pérdida repentina de líquido por la vagina. (Piensa que rompió la marcell). · Grace que puede estar en [place]. Rembert significa que usted ha tenido al menos 4 contracciones en 20 minutos o al menos 8 contracciones en [place]. · Nota que mims bebé ha dejado de moverse o lo hace mucho menos de lo habitual. 
· Tiene síntomas de júnior infección del tracto urinario. Estos pueden incluir: ¨ Dolor o ardor al orinar. ¨ Necesidad de orinar con frecuencia sin poder eliminar mucha orina. ¨ Dolor en el flanco, que se encuentra sofiya debajo de la caja torácica y [place] de la cintura en un lado de la espalda. ¨ Mandeep en la orina.  
Preste especial atención a los cambios en mims jong y asegúrese de comunicarse con mims médico si: 
 · Tiene flujo vaginal con un olor desagradable. · Tiene cambios en la piel, tales atul: 
¨ Salpullido. ¨ Comezón. ¨ Color amarillento en la piel. · Tiene otras inquietudes acerca de mims embarazo. Si tiene signos de trabajo de parto al llegar a las [address] Si tiene señales de [place] de [place] a las [address] [facility] o [address] [facility], es posible que mims médico le diga que llame cuando mims trabajo de parto se vuelva [place] [facility]. Los síntomas del trabajo de parto activo incluyen: · Contracciones que son regulares. · Contracciones a intervalos de menos de 5 minutos. · Contracciones anthony las cuales es difícil hablar. La atención de seguimiento es júnior parte clave de mims tratamiento y seguridad. Asegúrese de hacer y acudir a todas las citas, y llame a mims médico si está teniendo problemas. También es júnior buena idea saber los resultados de los exámenes y mantener júnior lista de los medicamentos que elke. Dónde puede encontrar más información en inglés? Ky Diaz a [email or web address]-[email or web address].info/. Escriba N030 en la búsqueda para aprender más acerca de \"Aprenda cuándo llamar a mims médico anthony el embarazo (después de 20 semanas) - [ Learning About When to Call Your Doctor During Pregnancy (After 20 Weeks) ]. \" 
Revisado: 16 marzo, 2017 Versión del contenido: 11.4 © 5028-5161 Healthwise, Incorporated. Las instrucciones de cuidado fueron adaptadas bajo licencia por Good Help Connections (which disclaims liability or warranty for this information). Si usted tiene [place] afección médica o sobre estas instrucciones, siempre pregunte a mims profesional de jong. [facility] [employer] niega toda garantía o responsabilidad por mims uso de esta información. Introducing [facility]! [facility] introduce portal paciente MyChart . Ahora se puede acceder a partes de mims expediente médico, enviar por correo electrónico la oficina de mims médico y solicitar renovaciones de medicamentos en línea. En mims navegador de Internet , [employer] a [email or web address] Silvio clic en el usuario por eLbronosman Selina? Lizzie Ligas clic aquí en la sesión Yaneth Rede. Verá la página de registro [place]. Ingrese mims código de [employer] aleks y atul aparece a continuación. Usted no tendrá que [employer] código después de ba completado el proceso de registro . Si usted no se inscribe antes de la fecha de caducidad , debe solicitar un nuevo código. · MyChart Código de acceso : [id number] Expires: 7/31/2018 10:03 AM 
 
Ingresa los últimos cuatro dígitos de mims Número de Seguro Social ( xxxx ) y fecha de nacimiento ( dd / mm / aaaa ) atul se indica y silvio clic en Enviar. Usted será llevado a la siguiente página de registro . Crear un ID MyChart . Esta será mims ID de inicio de sesión de MyChart y no puede ser [place] , por lo que pensar en júnior que es Deneise Pea y fácil de recordar . Crear júnior contraseña MyChart . Usted puede cambiar mims contraseña en cualquier momento . Ingrese mims Password Reset de preguntas y [place] . [place] se puede utilizar en un momento posterior si usted olvida mims contraseña. Introduzca mims dirección de correo electrónico . Crystal Maidens recibirá júnior notificación por correo electrónico cuando la nueva información está disponible en MyChart . Felicie Abler clic en Registrarse. Magdalen Ing rustam y descargar porciones de mims expediente médico. 
Silvio clic en el enlace de descarga del menú Resumen para descargar júnior copia portátil de mims información médica . Si tiene [employer] , por favor visite la sección de preguntas frecuentes del sitio web MyChart . Recuerde, MyChart NO es que se utilizará para las necesidades urgentes. Para emergencias médicas , llame al 911 . Ahora disponible en mims iPhone y Android ! Por favor proporcione herve resumen de la documentación de cuidado a mims próximo proveedor. Your primary care clinician is listed as Paul Mendoza.  If you have any questions after today's visit, please call [phone].

## 2018-06-04 NOTE — PROGRESS NOTES
Chief Complaint   Patient presents with    Routine Prenatal Visit     1. Have you been to the ER, urgent care clinic since your last visit? Hospitalized since your last visit? No    2. Have you seen or consulted any other health care providers outside of the [facility] since your last visit? Include any pap smears or colon screening. No    Patient denies any leakage of fluid or abnormal vaginal bleeding. Patient states she is having fetal movement, and is not having any pain. Patient states she is still taking prenatal vitamins.

## 2018-06-05 PROBLEM — O34.219 HX SUCCESSFUL VBAC (VAGINAL BIRTH AFTER CESAREAN), CURRENTLY PREGNANT: Status: ACTIVE | Noted: 2018-06-05

## 2018-06-05 PROBLEM — O09.33 LATE PRENATAL CARE AFFECTING PREGNANCY IN THIRD TRIMESTER: Status: ACTIVE | Noted: 2018-06-05

## 2018-06-05 PROBLEM — Z98.891 HISTORY OF C-SECTION: Status: ACTIVE | Noted: 2018-06-05

## 2018-06-05 PROBLEM — O40.3XX0 POLYHYDRAMNIOS IN THIRD TRIMESTER: Status: ACTIVE | Noted: 2018-06-05

## 2018-06-05 LAB — GLUCOSE 1H P 50 G GLC PO SERPL-MCNC: 101 MG/DL (ref 65–139)

## 2018-06-18 ENCOUNTER — ROUTINE PRENATAL (OUTPATIENT)
Dept: FAMILY MEDICINE CLINIC | Age: 33
End: 2018-06-18

## 2018-06-18 VITALS
HEIGHT: 64 IN | BODY MASS INDEX: 23.22 KG/M2 | RESPIRATION RATE: 16 BRPM | HEART RATE: 86 BPM | WEIGHT: 136 LBS | TEMPERATURE: 98.3 F | DIASTOLIC BLOOD PRESSURE: 60 MMHG | OXYGEN SATURATION: 97 % | SYSTOLIC BLOOD PRESSURE: 96 MMHG

## 2018-06-18 DIAGNOSIS — O09.33 LATE PRENATAL CARE AFFECTING PREGNANCY IN THIRD TRIMESTER: ICD-10-CM

## 2018-06-18 DIAGNOSIS — O34.219 HX SUCCESSFUL VBAC (VAGINAL BIRTH AFTER CESAREAN), CURRENTLY PREGNANT: ICD-10-CM

## 2018-06-18 DIAGNOSIS — Z98.891 HISTORY OF C-SECTION: ICD-10-CM

## 2018-06-18 DIAGNOSIS — Z92.29 H/O HIGH RISK MEDICATION TREATMENT: ICD-10-CM

## 2018-06-18 DIAGNOSIS — Z34.93 PRENATAL CARE IN THIRD TRIMESTER: Primary | ICD-10-CM

## 2018-06-18 LAB
BILIRUB UR QL STRIP: NEGATIVE
GLUCOSE UR-MCNC: NEGATIVE MG/DL
KETONES P FAST UR STRIP-MCNC: NORMAL MG/DL
PH UR STRIP: 7 [PH] (ref 4.6–8)
PROT UR QL STRIP: NORMAL
SP GR UR STRIP: 1.02 (ref 1–1.03)
UA UROBILINOGEN AMB POC: NORMAL (ref 0.2–1)
URINALYSIS CLARITY POC: CLEAR
URINALYSIS COLOR POC: YELLOW
URINE BLOOD POC: NEGATIVE
URINE LEUKOCYTES POC: NORMAL
URINE NITRITES POC: NEGATIVE

## 2018-06-18 NOTE — PROGRESS NOTES
Return OB Visit       Subjective:   Kyra Ernandez 29 y.o.  at [address] by [address] at [place] [address]. MELL: 2018. Pregnancy complicated by late to prenatal care, hx of csection and successful . MFM US concerning for polyhydramnios. Evver : 130490    Doing well today, no concerns. No leakage or bleeding. Endorses fetal movement. Taking prenatal vitamin, eating well, drinking water. Allergies- reviewed:   No Known Allergies      Medications- reviewed:   Current Outpatient Prescriptions   Medication Sig    prenatal vit-iron fumarate-fa (PRENATAL PLUS WITH IRON) 28 mg iron- 800 mcg tab Take 1 Tab by mouth daily. No current facility-administered medications for this visit. Past Medical History- reviewed:  Past Medical History:   Diagnosis Date     delivery delivered          Past Surgical History- reviewed:   No past surgical history on file. Social History- reviewed:  Social History     Social History    Marital status:      Spouse name: N/A    Number of children: N/A    Years of education: N/A     Occupational History    Not on file.      Social History Main Topics    Smoking status: Never Smoker    Smokeless tobacco: Never Used    Alcohol use No    Drug use: No    Sexual activity: Yes     Partners: Male     Other Topics Concern    Not on file     Social History Narrative         Immunizations- reviewed:   Immunization History   Administered Date(s) Administered    Influenza Vaccine (Quad) PF 2015    Tdap 2018       Objective:     Visit Vitals    BP 96/60    Pulse 86    Temp 98.3 °F (36.8 °C) (Oral)    Resp 16    Ht 5' 4\" (1.626 m)    Wt 136 lb (61.7 kg)    LMP  (LMP Unknown)    SpO2 97%    BMI 23.34 kg/m2       Physical Exam:  GENERAL APPEARANCE: alert, well appearing, in no apparent distress  LUNGS: clear to auscultation, no wheezes, rales or rhonchi, symmetric air entry  HEART: regular rate and rhythm, no murmurs  ABDOMEN: gravid, fundal height 31 cm, FHT present at 150s bpm  EXTREMITIES: no redness or tenderness in the calves or thighs. Trace BLE edema  NEUROLOGICAL: alert, oriented, normal speech, no focal findings or movement disorder noted    Bedside U/S today shows BREECH presentation    Labs  Recent Results (from the past 12 hour(s))   AMB POC URINALYSIS DIP STICK AUTO W/O MICRO    Collection Time: 18  4:31 PM   Result Value Ref Range    Color (UA POC) Yellow     Clarity (UA POC) Clear     Glucose (UA POC) Negative Negative    Bilirubin (UA POC) Negative Negative    Ketones (UA POC) Trace Negative    Specific gravity (UA POC) 1.020 1.001 - 1.035    Blood (UA POC) Negative Negative    pH (UA POC) 7.0 4.6 - 8.0    Protein (UA POC) 1+ Negative    Urobilinogen (UA POC) 1 mg/dL 0.2 - 1    Nitrites (UA POC) Negative Negative    Leukocyte esterase (UA POC) Trace Negative         Assessment   28 y/o  at  31w1d by US at 25w. Pregnancy complicated by late to prenatal care and hx of csection with successful . MFM US concerning for polyhydramnios. ICD-10-CM ICD-9-CM    1. Prenatal care in third trimester Z34.93 V22.1 AMB POC URINALYSIS DIP STICK AUTO W/O MICRO   2. History of  Z98.891 V45.89    3. H/O high risk medication treatment Z92.29 V67.51    4. Late prenatal care affecting pregnancy in third trimester O09.33 V23.7    5. Hx successful  (vaginal birth after ), currently pregnant O31.200 654.20          Plan   · SIUP at 31w1d by US at 25w  · PNL: O positive, Ab neg, HBsAg neg, VZV/ Rubella immune, HIV nonreactive, GC/ Chlamydia neg, T pall neg  · 1 hour GTT wnl   · Tdap 18  · M US   · mild polyhydramnios with IVON 26.4, normal cord, Posterior placenta. Fetal anatomy normal; incidental L sided intracardiac echogenic focus  · Repeat US for fetal growth and amniotic fluid assessment scheduled 18  · Bedside US today 18 showed BREECH presentation.  Confirmed with Dr. Jocelyn Sanchez  · Patient interested in TOLAC. · Previously discussed risks of TOLAC with patient, including uterine rupture and patient accepted risk when fetus was cephalic presentation. · However, now with BREECH presentation today, will wait to confirm on LUCIANA/ Jimbo Riddle on 7/2/18. · If BREECH presentation again on repeat US (~33w), will discuss with patient regarding risk/ benefit of repeat csection. · Continue routine prenatal care  · Follow up in 2 week for return OB visit. Scheduled 7/3/18 at 3:55 PM with me       Orders Placed This Encounter    AMB POC URINALYSIS DIP STICK AUTO W/O MICRO         Labor precautions discussed, including: Regular painful contractions, lasting for greater than one hour, taking your breath away; any vaginal bleeding; any leakage of fluid; or absent or decreased fetal movement. Call M.D. on call if any of these symptoms or signs occur. I have discussed the diagnosis with the patient and the intended plan as seen in the above orders. Patient verbalizes understanding of the treatment plan and agrees with the plan. The patient has received an after-visit summary and questions were answered concerning future plans. I have discussed medication side effects and warnings with the patient as well. Informed pt to return to the office or go to the ER if she experiences vaginal bleeding, vaginal discharge, leaking of fluid, pelvic cramping.       Pt seen and discussed with Dr. Claudia Green (attending physician)    Sarah Beth Hays MD  Family Medicine Resident

## 2018-06-18 NOTE — MR AVS SNAPSHOT
[address] [facility] 70 [facility] 
[phone] Patient: Isaias Elkins MRN: [id number] NFW:[id number] Visit Information Celi Penaloza Personal Médico Departamento Teléfono del Dep. Número de visita 6/18/2018  3:45 PM Farhan Lui [facility]5 [facility] [phone]  
  
 7/3/2018  3:55 PM  
OB VISIT with Farhan Lui MD  
[address] [place] [address] [facility] Appt Note: 33 week OB  
 [address] [facility],[place] [address] 70 [facility]  
[phone]  
  
   
 [address] [place] [address] 3 [facility] 99 [address] Upcoming Health Maintenance Date Due Influenza Age 5 to Adult 8/1/2018 PAP AKA CERVICAL CYTOLOGY 5/8/2021 DTaP/Tdap/Td series (2 - Td) 6/4/2028 Alergias  Review Complete El: 6/18/2018 Por: Quang Godinez LPN A partir del:  6/18/2018 No Known Allergies Vacunas actuales Mery Owen Sousa Angelina Influenza Vaccine (Quad) PF 1/29/2015 Tdap 6/4/2018 No revisadas esta visita You Were Diagnosed With   
  
 Christian Mcintyre Prenatal care in third trimester    -  Primary ICD-10-CM: Z34.93 
ICD-9-CM: V22.1 Partes vitales PS Pulso Temperatura Resp [place] ( percentil de crecimiento) Peso (percentil de crecimiento) 96/60 86 98.3 °F (36.8 °C) (Oral) 16 5' 4\" (1.626 m) 136 lb (61.7 kg) LMP (última jigar) SpO2 BMI (IMC) Estado obstétrico Estatus de tabaquísmo (LMP Unknown) 97% 23.34 kg/m2 Pregnant Never Smoker Historial de signos vitales BMI and BSA Data Body Mass Index Body Surface Area  
 23.34 kg/m 2 1.67 m 2 [facility] Name Phone [facility] #[id number]- ARMAAN BANUELOS - Raghavendra Aldair [address] [phone] Olson lista de medicamentos actualizada Zander Speck actualizada 6/18/18  4:54 PM.  Aakash Phillips use olson lista de medicamentos más reciente. prenatal vit-iron fumarate-fa 28 mg iron- 800 mcg Tab También conocido atul:  PRENATAL PLUS with IRON Take 1 Tab by mouth daily. Hicimos lo siguiente AMB POC URINALYSIS DIP STICK AUTO W/O MICRO [63381 CPT(R)] Instrucciones para el Paciente Semanas 30 a 32 de mims embarazo: Instrucciones de cuidado - [ Refugio Digna 30 to 28 of Your Pregnancy: Care Instructions ] Instrucciones de cuidado [place] Bro[place] a los últimos meses de embarazo. A estas [place], mims bebé en verdad comienza a tener la apariencia de un bebé, con matilda y piel rellenita. A medida que entra en las últimas semanas de [facility], usted comenzará a caer en la realidad de que va a tener un bebé. Liza es el momento de elegir un nombre, ordenar mims casa, organizar un cuarto de niños seguro y buscar atención infantil de calidad, de ser necesaria. Hacer esto con anterioridad le permitirá concentrarse en cuidar y disfrutar de mims bebé. También podría hacer júnior visita a la og de partos del hospital para Marylou Aland mejor idea sobre qué esperar mientras está en el hospital. 
Anthony estos últimos meses, es muy importante que se cuide y preste atención a lo que mims cuerpo necesita. Si mims médico le dice que está heide que trabaje, no se exija demasiado. Siga las recomendaciones proporcionadas en esta hoja de cuidados para aliviar la acidez estomacal y [address] várices. Si todavía no le tracy aplicado la vacuna Tdap (tétanos, difteria y tos [place]) anthony liza [facility], hable con mims médico acerca de aplicársela. Jennifer Floras a proteger a mims recién nacido contra la infección por tos ferina. La atención de seguimiento es júnior parte clave de mims tratamiento y seguridad. Asegúrese de hacer y acudir a todas las citas, y llame a mims médico si está teniendo problemas. También es júnior buena idea saber los resultados de los exámenes y mantener júnior lista de los medicamentos que elke. Cómo puede cuidarse en el hogar? Preste atención a los movimientos de mims bebé · Debería sentir que mims bebé se mueve varias veces al día. · Mims bebé ahora cambia menos de posición, y patea y da golpes con más frecuencia. · Mims bebé duerme de 20 a 45 minutos cada vez y [address] en determinados momentos del día. · Si mims médico quiere que cuente las patadas de mims bebé: ¨ Vacíe mims vejiga y acuéstese de lado o recuéstese en júnior silla cómoda. ¨ Anote la hora inicial. 
¨ Preste atención solo a los movimientos de mims bebé. Cuente todos los movimientos, excepto los del hipo. ¨ Después de que haya contado 10 movimientos, anote la hora. ¨ Anote cuántos minutos le llevaron a mims bebé los 10 movimientos. ¨ Si después de júnior hora no ha registrado 10 movimientos, coma o pretty algo, y luego cuente por otra hora. Si no registra 10 movimientos en cualquiera de las horas, llame a mims médico. 
Alivie la acidez estomacal 
· Coma comidas pequeñas y frecuentes. · No coma chocolate, menta ni comidas muy picantes. Evite las bebidas con cafeína, atul el café, el té y las sodas. · Evite doblarse hacia adelante o acostarse después de comer. · Nell júnior caminata corta después de comer. · Si tiene problemas de [place] estomacal anthony la noche, no coma por 2 horas antes de acostarse. · [place] antiácidos, atul Mylanta, Maalox, Rolaids o Tums. No tome antiácidos que contengan bicarbonato de sodio. [address] · Las várices son vasos sanguíneos que se dilatan debido a la mayor cantidad de main anthony el embarazo. Puede tener dolor o palpitaciones en las piernas. La mayoría de las várices desaparecerán después del Rafa. · Evite estar alba anthony mucho tiempo. Siéntese con las piernas cruzadas a la altura de los tobillos, no de las rodillas. · Siéntese con los pies elevados. · Evite usar ropa o medias ajustadas. Use medias de compresión. · Nell ejercicio con regularidad. Trate de caminar por lo menos 30 minutos al día. Dónde puede encontrar más información en inglés? Jayde End a [email or web address].info/. Nancy Rhodescock L476 en la búsqueda para aprender más acerca de \"Semanas 30 a 28 de olson embarazo: Instrucciones de cuidado - [ Zalma Santy 30 to 28 of Your Pregnancy: Care Instructions ]. \" 
Revisado: 16 marzo, 2017 Versión del contenido: 11.4 © 8605-1702 Healthwise, Incorporated. Las instrucciones de cuidado fueron adaptadas bajo licencia por Good Help Connections (which disclaims liability or warranty for this information). Si usted tiene [place] afección médica o sobre estas instrucciones, siempre pregunte a olson profesional de jong. Healthwise, Incorporated niega toda garantía o responsabilidad por olson uso de esta información. Semanas 32 a 34 de olson embarazo: Instrucciones de cuidado - [ Zalma [place] 32 to 29 of Your Pregnancy: Care Instructions ] Instrucciones de cuidado Shaniqua las últimas semanas de olson marimar Valadez podría sentir más joey y [place]. Es importante que descanse cuando pueda. Olson bebé en crecimiento está ejerciendo más presión sobre olson vejiga. Por eso, aleks vez necesite orinar con más frecuencia. Las hemorroides también son comunes. Estas son venas en la chary del recto que causan dolor y comezón. En la semana 36, a la mayoría de las [employer] un análisis para detectar el estreptococo del deandre B (GBS, por david siglas en inglés). El GBS es júnior bacteria común que puede vivir en la vagina y el recto. Podría enfermar a olson bebé después del nacimiento. Si el [employer] positivo, le darán antibióticos shaniqua el [place] de Rafa. Estos prevendrán que el bebé se contagie con la bacteria. Podría convenirle hablar con olson médico sobre poner en un banco la mian del cordón umbilical de olson bebé. Esta es la mian que queda en el cordón después del nacimiento. Si desea guardar esta mian, debe hacer los trámites necesarios con anticipación. No puede decidirlo en el último minuto. Si todavía no le tracy aplicado la vacuna Tdap (tétanos, difteria y tos [place]) anthony herve Ettie Oven, hable con mims médico acerca de aplicársela. Evangelina Macho a proteger a mims recién nacido contra la infección por tos ferina. La atención de seguimiento es júnior parte clave de mims tratamiento y seguridad. Asegúrese de hacer y acudir a todas las citas, y llame a mims médico si está teniendo problemas. También es júnior buena idea saber los resultados de los exámenes y mantener júnior lista de los medicamentos que elke. Cómo puede cuidarse en el hogar? [place] hemorroides · Aumente en mims dieta la cantidad de líquidos, frutas, verduras y [place]. [place] ayudará a WILLIS [employer] · Evite estar sentada anthony demasiado tiempo. Recuéstese sobre el lado aurelio varias veces al día. · Límpiese con papel higiénico suave y húmedo. O puede utilizar compresas de infusión de hamamelis [facility] (\"witch hazel\") o toallas de higiene personal. 
· Si tiene malestar, pruebe a usar compresas de hielo. O puede hacer paul de asiento tibios. Hágalos anthony 20 minutos por vez, según lo necesite. · Use júnior crema con hidrocortisona para el dolor y la picazón. Latanya Pulse son Anusol y Preparation H Hydrocortisone. · Pregúntele a mims médico si puede boyd un ablandador de heces de venta holly. Considere el amamantamiento · Los expertos recomiendan que las mujeres amamanten por 1 año o [place]. La leche materna es el mejor alimento para los bebés. · A los bebés les resulta más fácil digerir la [employer] materna que la [employer] de [place]. Y siempre está disponible, tiene la temperatura ideal y es gratuita. · En general, los bebés que se alimentan con [address] son más sanos que los bebés que se alimentan con leche de [place]. [place] son menos propensos a tener infecciones de oído, resfriados, diarrea y neumonía. ¨ Los bebés que solo genaro AT[employer]T materna son menos propensos a desarrollar asma y alergias. Jatinder son menos propensos a ser obesos. Jatinder son menos propensos a desarrollar diabetes o enfermedades del corazón. · [employer] a lexi bebés tienen menos sangrado después del [place]. Lexi úteros también recobran mims tamaño normal más rápido. · Algunas mujeres que amamantan bajan de [place] rápido. Elaborar leche quema calorías. · El amamantamiento puede disminuir el riesgo de tener cáncer de seno, cáncer de ovarios y osteoporosis. Decida sobre la circuncisión para los niños · Al boyd esta decisión, podría ser de ayuda pensar en lexi tradiciones personales, religiosas y familiares. Es mims decisión si desea conservar el pene de mims hijo natural o circuncidar a mims hijo. · Si decide que le gustaría que circuncidaran a mims bebé, hable con mims médico. Discuta cualquier inquietud que tenga sobre el dolor. También puede hablar acerca de lexi preferencias para la anestesia. Dónde puede encontrar más información en inglés? Keith De Jesus a [email or web address].[email or web address] Severiano Story A609 en la búsqueda para aprender más acerca de \"Semanas 28 a 29 de mims embarazo: Instrucciones de cuidado - [ Candice Noemi 32 to 29 of Your Pregnancy: Care Instructions ]. \" 
Revisado: 2017 Versión del contenido: 11.4 © 9890-8533 Healthwise, Incorporated. Las instrucciones de cuidado fueron adaptadas bajo licencia por Good Help Connections (which disclaims liability or warranty for this information). Si usted tiene [place] afección médica o sobre estas instrucciones, siempre pregunte a mims profesional de jong. Healthwise, Incorporated niega toda garantía o responsabilidad por mims uso de esta información. Decisión sobre un parto vaginal luego de júnior cesárea - [ Deciding About Vaginal Birth After  ] Instrucciones de cuidado Hace años, si usted tenía un parto por cesárea, todos los demás bebés que tuviera después tenían que nacer de la misma Layla. En la actualidad, muchas mujeres que tracy tenido júnior cesárea pueden intentar un parto vaginal con el siguiente bebé. A esto se le llama parto vaginal después de júnior cesárea o  (por david siglas en inglés). El  no es posible para algunas mujeres. Puede ba inquietudes acerca de mims jong y la de mims bebé. Pregúntele a mims médico o partera si probar el trabajo de parto es lo adecuado para usted. Todavía podría necesitar júnior cesárea aun cuando entre en [facility] gabriel Craig. La atención de seguimiento es júnior parte clave de mims tratamiento y seguridad. Asegúrese de hacer y acudir a todas las citas, y llame a mims médico si está teniendo problemas. También es júnior buena idea saber los resultados de los exámenes y mantener júnior lista de los medicamentos que elke. Por Michele Service tener un parto vaginal después de [facility] cesárea? · Desea experimentar un parto vaginal. 
· La cicatriz en el útero es del tipo que permitiría un  seguro. · No le preocupa el riesgo de tener júnior ruptura del [place]. En qué casos no podría tener un parto vaginal después de [facility] cesárea? · Mims hospital no ofrece . · Usted tiene un mayor riesgo de ruptura del útero porque: 
¨ La cicatriz de mims útero es vertical. Esta clase de cicatriz no suele permitir un  seguro. ¨ Ha tenido más de [employer]. ¨ Va a tener trillizos o más bebés en el mismo parto. · Tiene un problema de placenta u otro problema médico que podría hacer que el parto vaginal sea riesgoso. · Sucede algo anthony el embarazo o el trabajo de parto que hace que la cesárea sea necesaria. Por ejemplo: ¨ Usted tiene un problema con la presión arterial. 
¨ Mims bebé no tiene [facility] posición con la ari hacia abajo o está de nalgas o de lado. ¨ El [place] de parto no evoluciona heide. ¨ Mims bebé tiene problemas. ¨ La cicatriz en el útero sangra o se debilita. Dónde puede encontrar más información en inglés? Sandoval Dooley a [email or web address]-[email or web address].[email or web address] Maryse Fails U731 en la búsqueda para aprender más acerca de \"Decisión sobre un parto vaginal luego de júnior cesárea - [ Deciding About Vaginal Birth After  ]. \" 
Revisado: 2017 Versión del contenido: 11.4 © 7361-4328 Healthwise, Incorporated. Las instrucciones de cuidado fueron adaptadas bajo licencia por Good Qio (which disclaims liability or warranty for this information). Si usted tiene [place] afección médica o sobre estas instrucciones, siempre pregunte a mims profesional de jong. Healthwise, Incorporated niega toda garantía o responsabilidad por mims uso de esta información. Aprenda cuándo llamar a mims médico anthony el embarazo (después de 20 semanas) - [ Learning About When to Call Your Doctor During Pregnancy (After 20 Weeks) ] Instrucciones de cuidado Es normal que tenga inquietudes acerca de lo que podría ser un problema anthony el [facility]. Aunque la mayoría de las mujeres embarazadas no tienen ningún problema grave, es importante saber cuándo llamar a mims médico si tiene determinados síntomas o señales de trabajo de [place]. Estas son algunas sugerencias generales. Mims médico puede darle más información sobre cuándo llamar. Cuándo llamar a mims médico (después de 20 semanas) Llame al 911 en cualquier momento que sospeche que puede necesitar atención de [place]. Por ejemplo, llame si: · Tiene sangrado vaginal intenso. · Tiene dolor repentino e intenso en el abdomen. · Se desmayó (perdió el conocimiento). · Tiene júnior convulsión. · Ve o siente el cordón umbilical. 
· Grace que está a punto de dahlia a kemar a mims bebé y no puede llegar en forma jaramillo al hospital. 
Asiya Law a mims médico ahora mismo o busque atención médica inmediata si: · Tiene sangrado vaginal. 
· Tiene dolor en el abdomen. · Tiene fiebre. · Tiene síntomas de preeclampsia, tales atul: 
¨ Hinchazón repentina de la sol, las ines o los pies. ¨ Problemas nuevos con la visión (atul oscurecimiento de la visión o visión borrosa). ¨ Dolor de ari intenso. · Tiene júnior pérdida repentina de líquido por la vagina. (Piensa que rompió la marcell). · Grace que puede estar en [place]. [place] significa que usted ha tenido al menos 4 contracciones en 20 minutos o al menos 8 contracciones en [employer]. · Nota que mims bebé ha dejado de moverse o lo hace mucho menos de lo habitual. 
· Tiene síntomas de júnior infección del tracto urinario. Estos pueden incluir: ¨ Dolor o ardor al orinar. ¨ Necesidad de orinar con frecuencia sin poder eliminar mucha orina. ¨ Dolor en el flanco, que se encuentra sofiya debajo de la caja torácica y [place] de la cintura en un lado de la espalda. ¨ Mandeep en la orina. Preste especial atención a los cambios en mims jong y asegúrese de comunicarse con mmis médico si: · Tiene flujo vaginal con un olor desagradable. · Tiene cambios en la piel, tales atul: 
¨ Salpullido. ¨ Comezón. ¨ Color amarillento en la piel. · Tiene otras inquietudes acerca de mims embarazo. Si tiene signos de trabajo de parto al llegar a las [address] Si tiene señales de Marlyn Goldmann de [place] a las [address] [facility] o [address] [address], es posible que mims médico le diga que llame cuando mims trabajo de parto se vuelva [place]. Los síntomas del trabajo de parto activo incluyen: · Contracciones que son regulares. · Contracciones a intervalos de menos de 5 minutos. · Contracciones anthony las cuales es difícil hablar. La atención de seguimiento es júnior parte clave de mims tratamiento y seguridad. Asegúrese de hacer y acudir a todas las citas, y llame a mims médico si está teniendo problemas. También es júnior buena idea saber los resultados de los exámenes y mantener júnior lista de los medicamentos que elke. Dónde puede encontrar más información en inglés? Mindy Waltham a [email or web address]-[email or web address].[email or web address]  
Escriba N531 en la búsqueda para aprender más acerca de \"Aprenda cuándo llamar a mims médico anthony el embarazo (después de 20 semanas) - [ Learning About When to Call Your Doctor During Pregnancy (After 20 Weeks) ]. \" 
Revisado: 16 marzo, 2017 Versión del contenido: 11.4 © 9001-1745 Healthwise, Incorporated. Las instrucciones de cuidado fueron adaptadas bajo licencia por Good Help Connections (which disclaims liability or warranty for this information). Si usted tiene [place] afección médica o sobre estas instrucciones, siempre pregunte a mims profesional de jong. Healthwise, Incorporated niega toda garantía o responsabilidad por mims uso de esta información. Introducing [facility]! [facility] introduce portal paciente MyChart . Ahora se puede acceder a partes de mims expediente médico, enviar por correo electrónico la oficina de mims médico y solicitar renovaciones de medicamentos en línea. En mims navegador de Internet , Clementeen Pretzel a [email or web address] Silvio clic en el usuario por Samm Grapes? Felipa See clic aquí en la sesión Gurpreet Benitez. Verá la página de registro [place]. Ingrese mims código de [facility] Janine aleks y atul aparece a continuación. Usted no tendrá que [employer] código después de ba completado el proceso de registro . Si usted no se inscribe antes de la fecha de caducidad , debe solicitar un nuevo código. · MyChart Código de acceso : [id number] Expires: 7/31/2018 10:03 AM 
 
Ingresa los últimos cuatro dígitos de mims Número de Seguro Social ( xxxx ) y fecha de nacimiento ( dd / mm / aaaa ) atul se indica y silvio clic en Enviar. Usted será llevado a la siguiente página de registro . Crear un ID [place] . Esta será mims ID de inicio de sesión de MyChart y no puede ser [place] , por lo que pensar en júnior que es Gemma Grills y fácil de recordar . Crear júnior contraseña MyChart . Usted puede cambiar mims contraseña en cualquier momento . Ingrese mims Password Reset de preguntas y [place] .  [place] se puede utilizar en un momento posterior si usted olvida mims contraseña. Introduzca mims dirección de correo electrónico . Jenny Reaper recibirá júnior notificación por correo electrónico cuando la nueva información está disponible en MyChart . Rosemarie Filter clic en Registrarse. Sun Burton rustam y descargar porciones de mims expediente médico. 
Nell clic en el enlace de descarga del menú Resumen para descargar júnior copia portátil de mims información médica . Si tiene [employer] , por favor visite la sección de preguntas frecuentes del sitio web MyChart . Recuerde, SidelineSwaphart NO es que se utilizará para las necesidades urgentes. Para emergencias médicas , llame al 911 . Ahora disponible en mims iPhone y Android ! Por favor proporcione herve resumen de la documentación de cuidado a mims próximo proveedor. Your primary care clinician is listed as Dick Waytt. If you have any questions after today's visit, please call [phone].

## 2018-06-18 NOTE — PROGRESS NOTES
Chief Complaint   Patient presents with    Routine Prenatal Visit     [facility] 1d today. No bleeding or LOF.  +FM. 1. Have you been to the ER, urgent care clinic since your last visit? Hospitalized since your last visit? No    2. Have you seen or consulted any other health care providers outside of the [address] [place] [address] since your last visit? Include any pap smears or colon screening.  No

## 2018-06-18 NOTE — PATIENT INSTRUCTIONS
Semanas 30 a 32 de olson embarazo: Instrucciones de cuidado - [ Gisselle Jarrett 30 to 28 of Your Pregnancy: Care Instructions ]  Instrucciones de cuidado    Bob Ganser a los últimos meses de [facility]. A estas [place], olson bebé en verdad comienza a tener la apariencia de un bebé, con matilda y piel rellenita. A medida que entra en las últimas semanas de [facility], usrobert comenzará a caer en la realidad de que va a tener un bebé. Liza es el momento de elegir un nombre, ordenar olson casa, organizar un cuarto de niños seguro y buscar atención infantil de calidad, de ser necesaria. Hacer esto con anterioridad le permitirá concentrarse en cuidar y disfrutar de olson bebé. También podría hacer júnior visita a la og de partos del hospital para Christopher Kaiser mejor idea sobre qué esperar mientras está en el hospital.  Anthony estos últimos meses, es muy importante que se cuide y preste atención a lo que olson cuerpo necesita. Si olson médico le dice que está heide que trabaje, no se exija demasiado. Siga las recomendaciones proporcionadas en esta hoja de cuidados para aliviar la acidez estomacal y [address] várices. Si todavía no le tracy aplicado la vacuna Tdap (tétanos, difteria y tos [place]) anthony liza [facility], hable con olson médico acerca de aplicársela. Pat Gross a proteger a olson recién nacido contra la infección por tos ferina. La atención de seguimiento es júnior parte clave de olson tratamiento y seguridad. Asegúrese de hacer y acudir a todas las citas, y llame a olson médico si está teniendo problemas. También es júnior buena idea saber los resultados de los exámenes y mantener júnior lista de los medicamentos que elke. ¿Cómo puede cuidarse en el hogar? Preste atención a los movimientos de olson bebé  · Debería sentir que olson bebé se mueve varias veces al día. · Olson bebé ahora cambia menos de posición, y patea y da golpes con más frecuencia. · Olson bebé duerme de 20 a 45 minutos cada vez y [address] en determinados momentos del día.   · Si olson Dimple Pop que cuente las patadas de mims bebé:  Paddy Stalker mims vejiga y acuéstese de lado o recuéstese en júnior silla cómoda. ¨ Anote la hora inicial.  ¨ Preste atención solo a los movimientos de mims bebé. Cuente todos los movimientos, excepto los del hipo. ¨ Después de que haya contado 10 movimientos, anote la hora. ¨ Anote cuántos minutos le llevaron a mims bebé los 10 movimientos. ¨ Si después de júnior hora no ha registrado 10 movimientos, coma o pretty algo, y luego cuente por otra hora. Si no registra 10 movimientos en cualquiera de las horas, llame a mims médico.  Alivie la acidez estomacal  · Coma comidas pequeñas y frecuentes. · No coma chocolate, menta ni comidas muy picantes. Evite las bebidas con cafeína, atul el café, el té y las sodas. · Evite doblarse hacia adelante o acostarse después de comer. · Nell júnior caminata corta después de comer. · Si tiene problemas de [place] estomacal anthony la noche, no coma por 2 horas antes de acostarse. · Triumph antiácidos, atul Mylanta, Maalox, Rolaids o Tums. No tome antiácidos que contengan bicarbonato de sodio. Cuide las várices  · Las várices son vasos sanguíneos que se dilatan debido a la mayor cantidad de mian anthony el embarazo. Puede tener dolor o palpitaciones en las piernas. La mayoría de las várices desaparecerán después del [place]. · Evite estar alba anthony mucho tiempo. Siéntese con las piernas cruzadas a la altura de los tobillos, no de las rodillas. · Siéntese con los pies elevados. · Evite usar ropa o medias ajustadas. Use medias de compresión. · Nell ejercicio con regularidad. Trate de caminar por lo menos 30 minutos al día. ¿Dónde puede encontrar más información en inglés? Mely Fernandes a [email or web address].info/. Julee Fung [id number] en la búsqueda para aprender más acerca de \"Semanas 30 a 28 de mims embarazo: Instrucciones de cuidado - [ Eugenia Albright 30 to 28 of Your Pregnancy: Care Instructions ]. \"  Revisado: 16 marzo, 2017  Versión del contenido: 11.4  © 6533-8266 [employer]. Las instrucciones de cuidado fueron adaptadas bajo licencia por [employer] Help [employer]ctions (which disclaims liability or warranty for this information). Si marimar tiene [place] afección médica o sobre estas instrucciones, siempre pregunte a olson profesional de jong. Healthwise, Incorporated niega toda garantía o responsabilidad por olson uso de esta información. Semanas 32 a 34 de olson embarazo: Instrucciones de cuidado - [ José Northridge 32 to 29 of Your Pregnancy: Care Instructions ]  Instrucciones de cuidado    Anthony las últimas semanas de olson Allyson robert podría sentir más joey y [place]. Es importante que descanse cuando pueda. Olson bebé en crecimiento está ejerciendo más presión sobre olson vejiga. Por eso, aleks vez necesite orinar con más frecuencia. Las hemorroides también son comunes. Estas son venas en la chary del recto que causan dolor y comezón. En la semana 36, a la mayoría de las [employer] un análisis para detectar el estreptococo del deandre B (GBS, por david siglas en inglés). El GBS es júnior bacteria común que puede vivir en la vagina y el recto. Podría enfermar a olson bebé después del nacimiento. Si el [employer] positivo, le darán antibióticos anthony el [place]. Estos prevendrán que el bebé se contagie con la bacteria. Podría convenirle hablar con olson médico sobre poner en un banco la mian del cordón umbilical de olson bebé. Esta es la mian que queda en el cordón después del nacimiento. Si desea guardar esta mian, debe hacer los trámites necesarios con anticipación. No puede decidirlo en el último minuto. Si todavía no le tracy aplicado la vacuna Tdap (tétanos, difteria y tos [place]) anthony herve [facility], hable con olson médico acerca de aplicársela. Rosario Livers a proteger a olson recién nacido contra la infección por tos ferina. La atención de seguimiento es júnior parte clave de olson tratamiento y seguridad.  Asegúrese de hacer y acudir a todas las citas, y llame a mims médico si está teniendo problemas. También es júnior buena idea saber los resultados de los exámenes y mantener júnior lista de los medicamentos que elke. ¿Cómo puede cuidarse en el hogar? Alivio de las hemorroides  · Aumente en mims dieta la cantidad de líquidos, frutas, verduras y [place] ayudará a Darius Morris. · Evite estar sentada anthony demasiado tiempo. Recuéstese sobre el lado aurelio varias veces al día. · Límpiese con papel higiénico suave y húmedo. O puede utilizar compresas de infusión de hamamelis [facility] (\"witch hazel\") o toallas de higiene personal.  · Si tiene malestar, pruebe a usar compresas de hielo. O puede hacer paul de asiento tibios. Hágalos anthony 20 minutos por vez, según lo necesite. · Use júnior crema con hidrocortisona para el dolor y la picazón. Marce Punt son Anusol y Preparation H Hydrocortisone. · Pregúntele a mims médico si puede boyd un ablandador de heces de venta holly. Considere el amamantamiento  · Los expertos recomiendan que las mujeres amamanten por [place]. La leche materna es el mejor alimento para los bebés. · A los bebés les resulta más fácil digerir la [employer] materna que la [employer] de [place]. Y siempre está disponible, tiene la temperatura ideal y es gratuita. · En general, los bebés que se alimentan con [address] son más sanos que los bebés que se alimentan con leche de [place]. [place] son menos propensos a tener infecciones de oído, resfriados, diarrea y neumonía. ¨ Los bebés que solo genaro [employer] materna son menos propensos a desarrollar asma y alergias. [place] son menos propensos a ser obesos. [place] son menos propensos a desarrollar diabetes o enfermedades del corazón. · [employer] a lexi bebés tienen menos sangrado después del [place]. Lexi úteros también recobran mims tamaño normal más rápido.   · Algunas mujeres que bentan kati de [place] estradao. Elaborar leche quema calorías. · El amamantamiento puede disminuir el riesgo de tener cáncer de seno, cáncer de ovarios y osteoporosis. Decida sobre la circuncisión para los niños  · Al boyd esta decisión, podría ser de ayuda pensar en david tradiciones personales, religiosas y familiares. Es mims decisión si desea conservar el pene de mims hijo natural o circuncidar a mims hijo. · Si decide que le gustaría que circuncidaran a mims bebé, hable con mims médico. Discuta cualquier inquietud que tenga sobre el dolor. También puede hablar acerca de david preferencias para la anestesia. ¿Dónde puede encontrar más información en inglés? Mathieu File a http://jacy-rashida.[email or web address] Go Monk [id number] en la búsqueda para aprender más acerca de \"Semanas 28 a 29 de mims embarazo: Instrucciones de cuidado - [ Clary Fears 32 to 29 of Your Pregnancy: Care Instructions ]. \"  Revisado: 2017  Versión del contenido: 11.4  © 3108-7980 Healthwise, Incorporated. Las instrucciones de cuidado fueron adaptadas bajo licencia por Good Help Connections (which disclaims liability or warranty for this information). Si usted tiene [place] afección médica o sobre estas instrucciones, siempre pregunte a mims profesional de jong. Healthwise, Incorporated niega toda garantía o responsabilidad por mims uso de esta información. Decisión sobre un parto vaginal luego de júnior cesárea - [ Deciding About Vaginal Birth After  ]  Instrucciones de cuidado  Hace años, si usted tenía un parto por cesárea, todos los demás bebés que [place] después tenían que nacer de la [place]. En la actualidad, muchas mujeres que trayc tenido júnior cesárea pueden intentar un parto vaginal con el siguiente bebé. A esto se le llama parto vaginal después de júnior cesárea o  (por david siglas en inglés). El  no es posible para algunas mujeres. Puede ba inquietudes acerca de mims jnog y la de mims bebé.  Pregúntele a mims médico o partera si probar el trabajo de parto es lo adecuado para usted. Todavía podría necesitar júnior cesárea aun cuando entre en [place]. La atención de seguimiento es júnior parte clave de olson tratamiento y seguridad. Asegúrese de hacer y acudir a todas las citas, y llame a olson médico si está teniendo problemas. También es júnior buena idea saber los resultados de los exámenes y mantener júnior lista de los medicamentos que elke. ¿Por Jessie Patton un parto vaginal después de [facility] cesárea? · Desea experimentar un parto vaginal.  · La cicatriz en el útero es del tipo que permitiría un  seguro. · No le preocupa el riesgo de tener júnior ruptura del [place]. ¿En qué casos no podría tener un parto vaginal después de [facility] cesárea? · [facility] no ofrece . · Usted tiene un mayor riesgo de ruptura del útero porque:  ¨ La cicatriz de olson útero es vertical. Esta clase de cicatriz no suele permitir un  seguro. ¨ Ha tenido más de [employer]. ¨ Va a tener trillizos o más bebés en el mismo parto. · Tiene un problema de placenta u otro problema médico que podría hacer que el parto vaginal sea riesgoso. · Sucede algo anthony el embarazo o el trabajo de parto que hace que la cesárea sea necesaria. Por ejemplo:  ¨ Usted tiene un problema con la presión arterial.  ¨ Olson bebé no tiene júnior posición con la ari hacia abajo o está de nalgas o de lado. ¨ El [place] de parto no evoluciona heide. ¨ Olson bebé tiene problemas. ¨ La cicatriz en el útero sangra o se debilita. ¿Dónde puede encontrar más información en inglés? Ky infante http://jacy-rashida.info/. Clive VILLA en la búsqueda para aprender más acerca de \"Decisión sobre un parto vaginal luego de júnior cesárea - [ Deciding About Vaginal Birth After  ]. \"  Revisado: 2017  Versión del contenido: 11.4  © 8224-7398 Healthwise, [employer].  Las instrucciones de cuidado fueron adaptadas bajo licencia por Good Help Connections (which disclaims liability or warranty for this information). Si usted tiene [place] afección médica o sobre estas instrucciones, siempre pregunte a mims profesional de jong. [facility]orporated niega toda garantía o responsabilidad por mims uso de esta información. Kurt Hippo a mims médico anthony el embarazo (después de 20 semanas) - [ Learning About When to Call Your Doctor During Pregnancy (After 20 Weeks) ]  Instrucciones de cuidado  Es normal que tenga inquietudes acerca de lo que podría ser un problema anthony el [place]. Aunque la mayoría de las mujeres embarazadas no tienen ningún problema grave, es importante saber cuándo llamar a mims médico si tiene determinados síntomas o señales de trabajo de [place]. Estas son algunas sugerencias generales. Mims médico puede darle más información sobre cuándo llamar. Cuándo llamar a mims médico (después de 20 semanas)  Llame al 911 en cualquier momento que sospeche que puede necesitar atención de [place]. Por ejemplo, llame si:  · Tiene sangrado vaginal intenso. · Tiene dolor repentino e intenso en el abdomen. · Se desmayó (perdió el conocimiento). · Tiene júnior convulsión. · Ve o siente el cordón umbilical.  · Grace que está a punto de dahlia a kemar a mims bebé y no puede llegar en forma jaramillo al hospital.  Lorrayne Dc a mims médico ahora mismo o busque atención médica inmediata si:  · Tiene sangrado vaginal.  · Tiene dolor en el abdomen. · Tiene fiebre. · Tiene síntomas de preeclampsia, tales atul:  ¨ Hinchazón repentina de la sol, las ines o los pies. ¨ Problemas nuevos con la visión (atul oscurecimiento de la visión o visión borrosa). ¨ Dolor de ari intenso. · Tiene júnior pérdida repentina de líquido por la vagina. (Piensa que rompió la marcell). · Grace que puede estar en [place] significa que usted ha tenido al menos 4 contracciones en 20 minutos o al menos 8 contracciones en [place].   · Nota que mims bebé ha dejado de moverse o lo hace mucho menos de lo habitual.  · Tiene síntomas de júnior infección del tracto urinario. Estos pueden incluir:  ¨ Dolor o ardor al orinar. ¨ Necesidad de orinar con frecuencia sin poder eliminar mucha orina. ¨ Dolor en el flanco, que se encuentra sofiya debajo de la caja torácica y [place] de la cintura en un lado de la espalda. ¨ Mandeep en la orina. Preste especial atención a los cambios en mims jong y asegúrese de comunicarse con mims médico si:  · Tiene flujo vaginal con un olor desagradable. · Tiene cambios en la piel, tales atul:  ¨ Salpullido. ¨ Comezón. ¨ Color amarillento en la piel. · Tiene otras inquietudes acerca de mims embarazo. Si tiene signos de trabajo de parto al llegar a las [address] o más  Si tiene señales de [employer] de parto a las 37 semanas o [place], es posible que mims médico le diga que llame cuando mims trabajo de parto se vuelva más [place]. Los síntomas del trabajo de parto activo incluyen:  · Contracciones que son regulares. · Contracciones a intervalos de menos de 5 minutos. · Contracciones anthony las cuales es difícil hablar. La atención de seguimiento es júnior parte clave de mims tratamiento y seguridad. Asegúrese de hacer y acudir a todas las citas, y llame a mims médico si está teniendo problemas. También es júnior buena idea saber los resultados de los exámenes y mantener júnior lista de los medicamentos que elke. ¿Dónde puede encontrar más información en inglés? Joshua Newberry a http://jacy-rashida.info/. Escriba [id number] en la búsqueda para aprender más acerca de \"Aprenda cuándo llamar a mims médico anthony el embarazo (después de 20 semanas) - [ Learning About When to Call Your Doctor During Pregnancy (After 20 Weeks) ]. \"  Revisado: 16 marzo, 2017  Versión del contenido: 11.4  © 1897-1897 Healthwise, [employer]. Las instrucciones de cuidado fueron adaptadas bajo licencia por Good Help Connections (which disclaims liability or warranty for this information).  Si usted tiene preguntas sobre júnior afección médica o sobre estas instrucciones, siempre pregunte a mims profesional de jong. [facility]orporated niega toda garantía o responsabilidad por mims uso de esta información.

## 2018-06-19 NOTE — PROGRESS NOTES
I reviewed with the resident the medical history and the resident's findings on the physical examination. I discussed with the resident the patient's diagnosis and concur with the plan. I supervised resident performed bedside [address] presentation as of 6/18/18  [facility] follow up in July for growth scan and repeat IVON for concerns of polyhydramnios; re-assess position at that time  Agree with remaining plan as outlined in resident note.

## 2018-07-10 ENCOUNTER — HOSPITAL ENCOUNTER (OUTPATIENT)
Dept: PERINATAL CARE | Age: 33
Discharge: HOME | End: 2018-07-10
Attending: OBSTETRICS & GYNECOLOGY
Payer: SELF-PAY

## 2018-07-10 PROCEDURE — 76816 OB US FOLLOW-UP PER FETUS: CPT | Performed by: OBSTETRICS & GYNECOLOGY

## 2018-07-10 PROCEDURE — 76819 FETAL BIOPHYS PROFIL W/O NST: CPT | Performed by: OBSTETRICS & GYNECOLOGY

## 2018-07-13 ENCOUNTER — ROUTINE PRENATAL (OUTPATIENT)
Dept: FAMILY MEDICINE CLINIC | Age: 33
End: 2018-07-13

## 2018-07-13 VITALS
RESPIRATION RATE: 16 BRPM | BODY MASS INDEX: 23.56 KG/M2 | SYSTOLIC BLOOD PRESSURE: 100 MMHG | WEIGHT: 138 LBS | HEART RATE: 75 BPM | DIASTOLIC BLOOD PRESSURE: 58 MMHG | TEMPERATURE: 96.2 F | HEIGHT: 64 IN | OXYGEN SATURATION: 98 %

## 2018-07-13 DIAGNOSIS — Z34.93 PRENATAL CARE IN THIRD TRIMESTER: Primary | ICD-10-CM

## 2018-07-13 DIAGNOSIS — O34.219 HX SUCCESSFUL VBAC (VAGINAL BIRTH AFTER CESAREAN), CURRENTLY PREGNANT: ICD-10-CM

## 2018-07-13 DIAGNOSIS — Z98.891 HISTORY OF C-SECTION: ICD-10-CM

## 2018-07-13 DIAGNOSIS — Z92.29 H/O HIGH RISK MEDICATION TREATMENT: ICD-10-CM

## 2018-07-13 DIAGNOSIS — O09.33 LATE PRENATAL CARE AFFECTING PREGNANCY IN THIRD TRIMESTER: ICD-10-CM

## 2018-07-13 DIAGNOSIS — O35.BXX0 ABNORMAL FETAL ECHOCARDIOGRAPHY AFFECTING ANTEPARTUM CARE OF MOTHER, SINGLE OR UNSPECIFIED FETUS: ICD-10-CM

## 2018-07-13 LAB
BILIRUB UR QL STRIP: NEGATIVE
GLUCOSE UR-MCNC: NEGATIVE MG/DL
KETONES P FAST UR STRIP-MCNC: NEGATIVE MG/DL
PH UR STRIP: 6.5 [PH] (ref 4.6–8)
PROT UR QL STRIP: NEGATIVE
SP GR UR STRIP: 1.02 (ref 1–1.03)
UA UROBILINOGEN AMB POC: NORMAL (ref 0.2–1)
URINALYSIS CLARITY POC: NORMAL
URINALYSIS COLOR POC: YELLOW
URINE BLOOD POC: NEGATIVE
URINE LEUKOCYTES POC: NORMAL
URINE NITRITES POC: NEGATIVE

## 2018-07-13 NOTE — PATIENT INSTRUCTIONS
Semanas 34 a 36 de mims embarazo: Instrucciones de cuidado - [ [place] 34 to 39 of Your Pregnancy: Care Instructions ]  Instrucciones de cuidado    A estas [place], mims bebé y mims abdomen habrán crecido considerablemente. Doe es Mahad de dahlia a kemar. En un embarazo a término se puede dahlia a [employer] 40 y 43. Los pulmones de mims bebé están doe listos para respirar aire. Los huesos de la ari de mims bebé ahora son bastante firmes atul para protegerla abigail se mantienen lo suficientemente blandos atul para moverse al atravesar el canal de [place]. Es posible que sienta entusiasmo, gwyn, ansiedad o miedo. Quizá se pregunte cómo se dará cuenta de si está en trabajo de parto o qué esperar en thania momento. Trate de ser flexible con david expectativas respecto del nacimiento. Dado que cada nacimiento es diferente, no hay manera de saber exactamente cómo será mims parto. Esta hoja de cuidados la ayudará a saber qué esperar y cómo prepararse. Le podría facilitar el parto. Si todavía no le tracy aplicado la vacuna Tdap (tétanos, difteria y tos [place]) anthony herve [place], hable con mims médico acerca de aplicársela. Sera [place] a proteger a mims recién nacido contra la infección por tos ferina. En la semana 36, a la mayoría de las mujeres se les hace júnior prueba de estreptococos del deandre B (GBS, por david siglas en inglés). Los estreptococos del deandre B son bacterias comunes que pueden vivir en la vagina y el recto. Pueden hacer que mims bebé se enferme después del parto. Si el resultado es positivo, usted recibirá antibióticos anthony el trabajo de [place]. Los medicamentos evitarán que mims bebé contraiga las bacterias. La atención de seguimiento es júnior parte clave de mims tratamiento y seguridad. Asegúrese de hacer y acudir a todas las citas, y llame a mims médico si está teniendo problemas. También es júnior buena idea saber los resultados de david exámenes y mantener júnior lista de los medicamentos que elke.   ¿Cómo puede cuidarse en el hogar? Aprenda sobre las alternativas para aliviar el dolor  · El dolor se manifiesta de modo diferente en cada yuki. Hable con mims médico acerca de david sentimientos sobre el dolor. · Puede elegir entre varias formas de aliviar el dolor. Estas incluyen medicamentos o técnicas de respiración, así atul medidas para estar cómoda. Usted puede utilizar más de Luwana Pucker opción. · Si elige un analgésico (medicamento para el dolor) anthony el trabajo de [place], hable con mims médico acerca de david opciones. Algunos medicamentos reducen la ansiedad y [facility] a aliviar parte del dolor. Otros adormecen la parte inferior del cuerpo para que no sienta dolor. · Asegúrese de decirle a mims médico acerca de mims elección de analgésico antes de empezar el trabajo de parto o muy temprano en el [place] de [place]. Es posible que pueda cambiar de parecer a medida que avanza el [place] de [place]. · Milli vez se duerme a júnior yuki con medicamentos administrados a través de júnior máscara o por vía intravenosa (IV). Trabajo de parto y [place]  · La primera etapa del [place] de parto se divide en raina fases: Armand Mitchell y de transición. ¨ La mayoría de las mujeres experimentan la fase latente del [place] de parto en david hogares. Usted puede [employer] o descansar, comer refrigerios livianos, beber líquidos miguel y comenzar a contar las contracciones. ¨ Cuando advierta que se le vuelve difícil hablar anthony júnior contracción, es posible que esté por pasar a la fase activa. Anthony la fase Junella Lindsay, debería ir al hospital si no está allí aún. ¨ Usted está en la fase activa cuando tiene contracciones cada 3 o 4 minutos y sena alrededor de 60 segundos. El ambreen uterino comienza a abrirse con Ltanya Mule. ¨ Si se le rompe la marcell, las contracciones serán más intensas y más frecuentes. ¨ Anthony la fase de transición, el ambreen uterino se estira y las contracciones se producen con Ltanya Mule.   ¨ Quizá tenga deseos de pujar, sin embargo es posible que el ambreen uterino aún no esté preparado. El médico le dirá cuándo pujar. · La segunda etapa comienza cuando el ambreen uterino se abre por completo y usted está lista para pujar. ¨ Las contracciones son muy intensas a fin de empujar al bebé por el canal de parto. ¨ Sentirá la necesidad de pujar. Podría sentir atul si tuviera ganas de evacuar el intestino. ¨ Quizás la entrenen a [place] [address] contracciones. Estas contracciones serán muy intensas abigail no ocurrirán con tanta frecuencia. Puede descansar un poco entre contracciones. ¨ Es posible que esté sensible e irritable. Es posible que no se dé cuenta de lo que pasa a mims alrededor. ¨ Un último esfuerzo y habrá nacido mims bebé. · La tercera etapa ocurre cuando con unas cuantas contracciones más se expulsa la placenta. [place] puede durar 30 minutos o menos. · La cuarta etapa es la de recuperación. Es posible que se sienta abrumada con las emociones y exhausta abigail alerta. Liza es un buen momento para comenzar el amamantamiento. ¿Dónde puede encontrar más información en inglés? Miguelito Mckeon a [email or web address].[email or web address] Eugenia  [id number] en la búsqueda para aprender más acerca de \"Semanas 34 a 39 de mims embarazo: Instrucciones de cuidado - [ Jenae Semen 34 to 39 of Your Pregnancy: Care Instructions ]. \"  Revisado: 21 noviembre, 2017  Versión del contenido: 11.7  © 5581-7931 [place]. Las instrucciones de cuidado fueron adaptadas bajo licencia por [employer] Help [employer]ctions (which disclaims liability or warranty for this information). Si usted tiene [place] afección médica o sobre estas instrucciones, siempre pregunte a mims profesional de jong. [facility], Incorporated niega toda garantía o responsabilidad por mims uso de esta información.        Austin Love a mims médico anthony el embarazo (después de 20 semanas) - [ Learning About When to Call Your Doctor During Pregnancy (After 20 Weeks) ]  Instrucciones de cuidado  Es normal que tenga inquietudes acerca de lo que podría ser un problema anthony el [facility]. Aunque la mayoría de las mujeres embarazadas no tienen ningún problema grave, es importante saber cuándo llamar a mims médico si tiene determinados síntomas o señales de trabajo de [place]. Estas son algunas sugerencias generales. Mims médico puede darle más información sobre cuándo llamar. Cuándo llamar a mims médico (después de 20 semanas)  Llame al 911 en cualquier momento que sospeche que puede necesitar atención de [place]. Por ejemplo, llame si:  · Tiene sangrado vaginal intenso. · Tiene dolor repentino e intenso en el abdomen. · Se desmayó (perdió el conocimiento). · Tiene júnior convulsión. · Ve o siente el cordón umbilical.  · Grace que está a punto de dahlia a kemar a mims bebé y no puede llegar en forma jaramillo al hospital.  Mason Villanueva a mims médico ahora mismo o busque atención médica inmediata si:  · Tiene sangrado vaginal.  · Tiene dolor en el abdomen. · Tiene fiebre. · Tiene síntomas de preeclampsia, tales atul:  ¨ Hinchazón repentina de la sol, las ines o los pies. ¨ Problemas nuevos con la visión (atul oscurecimiento de la visión o visión borrosa). ¨ Dolor de ari intenso. · Tiene júnior pérdida repentina de líquido por la vagina. (Piensa que rompió la marcell). · Grace que puede estar en [place]. [place] significa que usted ha tenido al menos 4 contracciones en 20 minutos o al menos 8 contracciones en [employer]. · Nota que mims bebé ha dejado de moverse o lo hace mucho menos de lo habitual.  · Tiene síntomas de júnior infección del tracto urinario. Estos pueden incluir:  ¨ Dolor o ardor al orinar. ¨ Necesidad de orinar con frecuencia sin poder eliminar mucha orina. ¨ Dolor en el flanco, que se encuentra sofiya debajo de la caja torácica y [place] de la cintura en un lado de la espalda. ¨ Mandeep en la orina.   Preste especial atención a los cambios en mims jong y asegúrese de comunicarse con mims médico si:  · Lawence Hu flujo vaginal con un olor desagradable. · Tiene cambios en la piel, tales atul:  ¨ Salpullido. ¨ Comezón. ¨ Color amarillento en la piel. · Tiene otras inquietudes acerca de mims embarazo. Si tiene signos de trabajo de parto al llegar a las [address] [facility] o más  Si tiene señales de [place] de parto a las 37 semanas o [place], es posible que mims médico le diga que llame cuando mims trabajo de parto se vuelva más [place]. Los síntomas del trabajo de parto activo incluyen:  · Contracciones que son regulares. · Contracciones a intervalos de menos de 5 minutos. · Contracciones anthony las cuales es difícil hablar. La atención de seguimiento es júnior parte clave de mims tratamiento y seguridad. Asegúrese de hacer y acudir a todas las citas, y llame a mims médico si está teniendo problemas. También es júnior buena idea saber los resultados de david exámenes y mantener júnior lista de los medicamentos que elke. ¿Dónde puede encontrar más información en inglés? Makayla Elisa a [email or web address] Escriba H340 en la búsqueda para aprender más acerca de \"Aprenda cuándo llamar a mims médico anthony el embarazo (después de 20 semanas) - [ Learning About When to Call Your Doctor During Pregnancy (After 20 Weeks) ]. \"  Revisado: 21 noviembre, 2017  Versión del contenido: 11.7  © 7079-6054 [employer]. Las instrucciones de cuidado fueron adaptadas bajo licencia por [employer] Connections (which disclaims liability or warranty for this information). Si usted tiene [place] afección médica o sobre estas instrucciones, siempre pregunte a mims profesional de jong. [employer]orporated niega toda garantía o responsabilidad por mims uso de esta información. Contracciones de [place]: Instrucciones de cuidado - [ [place] Contractions: Care Instructions ]  Instrucciones de cuidado    Las contracciones de [place] le preparan el útero para el Fl[place].  Piense en ellas atul si fueran un ejercicio de \"precalentamiento\" que hace mims cuerpo. Puede comenzar a sentirlas [employer] 28 y 27 del embarazo. Monie comienzan tan temprano atul en la semana 20. Las contracciones de Bhavani Cage por lo general ocurren con mayor frecuencia anthony el noveno mes. Pueden desaparecer cuando usted Vena Buzzard y regresar cuando descansa. Estas contracciones son atul contracciones leves del Selena Ramiro de [place] real, aunque ocurren con nicolasa frecuencia. (Usted siente menos de 8 en [facility]). No causan dilatación del ambreen uterino. Puede resultarle difícil diferenciar [employer] contracciones de Bhavani Cage y el trabajo de parto real, sobre todo anthony el primer Carleene Crook. La atención de seguimiento es júnior parte clave de mims tratamiento y seguridad. Asegúrese de hacer y acudir a todas las citas, y llame a mims médico si está teniendo problemas. También es júnior buena idea saber los resultados de david exámenes y mantener júnior lista de los medicamentos que elke. ¿Cómo puede cuidarse en el hogar? · Pruebe un baño tibio para ayudar a aliviar la tensión muscular y reducir el dolor. · Cambie de posición cada 30 minutos. Tómese descansos si tiene que estar sentada por [employer]. [place] a caminar. · Steffanie abundante agua, suficiente atul para que mims orina sea de color amarillo andrés o sarbjit atul el [place]. · Hacer unas caminatas cortas puede ayudarla a sentirse mejor. Las contracciones que se vuelvan más emiliano o más frecuentes deben ser evaluadas por mims médico.  ¿Dónde puede encontrar más información en inglés? Miguelito Mckeon a [email or web address]-[email or web address].[email or web address] Eugenia Chau I531 en la búsqueda para aprender más acerca de \"Contracciones de Bhavani Cage: Instrucciones de cuidado - [ Bhavani Cage Contractions: Care Instructions ]. \"  Revisado: 21 noviembre, 2017  Versión del contenido: 11.7  © 0785-5030 [employer], AboutUs.org.  Las instrucciones de cuidado fueron adaptadas bajo licencia por Good Help Connections (which disclaims liability or warranty for this information). Si usted tiene [place] afección médica o sobre estas instrucciones, siempre pregunte a mims profesional de jong. Health system, Incorporated niega toda garantía o responsabilidad por mims uso de esta información.

## 2018-07-13 NOTE — PROGRESS NOTES
Return OB Visit       Subjective:   Lilian Rey 29 y.o.  at 34w5d by [address] at 25wk. MELL: 2018. Pregnancy complicated by late to prenatal care, hx of csection and successful . MFM US now concerning for abnormal cardiac outflow tracts and course of SVC. States she is doing well. + fetal movement, very active. No fluid leakage or vaginal bleeding. Occasional pelvic pressure and contraction. Taking PNV and eating healthy. Has already been [facility]  at 9 AM.     Allergies- reviewed:   No Known Allergies      Medications- reviewed:   Current Outpatient Prescriptions   Medication Sig    prenatal vit-iron fumarate-fa (PRENATAL PLUS WITH IRON) 28 mg iron- 800 mcg tab Take 1 Tab by mouth daily. No current facility-administered medications for this visit. Past Medical History- reviewed:  Past Medical History:   Diagnosis Date     delivery delivered          Past Surgical History- reviewed:   No past surgical history on file. Social History- reviewed:  Social History     Social History    Marital status:      Spouse name: N/A    Number of children: N/A    Years of education: N/A     Occupational History    Not on file.      Social History Main Topics    Smoking status: Never Smoker    Smokeless tobacco: Never Used    Alcohol use No    Drug use: No    Sexual activity: Yes     Partners: Male     Other Topics Concern    Not on file     Social History Narrative         Immunizations- reviewed:   Immunization History   Administered Date(s) Administered    Influenza Vaccine (Quad) PF 2015    Tdap 2018         Objective:     Visit Vitals    /58    Pulse 75    Temp 96.2 °F (35.7 °C) (Oral)    Resp 16    Ht 5' 4\" (1.626 m)    Wt 138 lb (62.6 kg)    LMP  (LMP Unknown)    SpO2 98%    BMI 23.69 kg/m2       Physical Exam:  GENERAL APPEARANCE: alert, well appearing, in no apparent distress  LUNGS: clear to auscultation, no wheezes, rales or rhonchi, symmetric air entry  HEART: regular rate and rhythm, no murmurs  ABDOMEN: gravid, fundal height 34 cm, FHT present at 130s bpm  EXTREMITIES: no redness or tenderness in the calves or thighs, no edema  NEUROLOGICAL: alert, oriented, normal speech, no focal findings or movement disorder noted    Labs  Recent Results (from the past 12 hour(s))   AMB POC URINALYSIS DIP STICK AUTO W/O MICRO    Collection Time: 18  1:56 PM   Result Value Ref Range    Color (UA POC) Yellow     Clarity (UA POC) Slightly Cloudy     Glucose (UA POC) Negative Negative    Bilirubin (UA POC) Negative Negative    Ketones (UA POC) Negative Negative    Specific gravity (UA POC) 1.025 1.001 - 1.035    Blood (UA POC) Negative Negative    pH (UA POC) 6.5 4.6 - 8.0    Protein (UA POC) Negative Negative    Urobilinogen (UA POC) 1 mg/dL 0.2 - 1    Nitrites (UA POC) Negative Negative    Leukocyte esterase (UA POC) 1+ Negative         Assessment   28 y/o  at  34w5d by US at 25w. Pregnancy complicated by late to prenatal care and hx of csection with successful . MFM US concerning for abnormal cardiac outflow tracts and course of SVC. ICD-10-CM ICD-9-CM    1. Prenatal care in third trimester Z34.93 V22.1 AMB POC URINALYSIS DIP STICK AUTO W/O MICRO   2. H/O high risk medication treatment Z92.29 V67.51    3. Late prenatal care affecting pregnancy in third trimester O09.33 V23.7    4. History of  Z98.891 V45.89    5. Hx successful  (vaginal birth after ), currently pregnant O31.200 654.20    6. Abnormal fetal echocardiography affecting antepartum care of mother, single or unspecified fetus O35. 8XX0 655.83          Plan   · SIUP of    · PNL: O positive, Ab neg, HBsAg neg, VZV/ Rubella immune, HIV nonreactive, GC/ Chlamydia neg, T pall neg. 1 hour GTT wnl   · Tdap 18  · MFM US   · Follow up in 4 weeks for growth assessment   · US 7/10/18: normal fluid, cord.  POSTERIOR placenta. CEPHALIC presentation. Appropriate fetal growth. Abnormal heart. BPP 8/8; reassuring fetal surveillance  · Abnormal appearance of cardiac outflow tracts and SVC. Pulmonary A disproportionately large compared to the aorta. Unusual course of SVC, seen very right lateral. Aortic arch views may have some narrowing. · Scheduled for pediatric fetal echo at [facility] on 7/16 at [address]  · Patient interested in [facility]. Is cephalic on 1/14 US  · Previously discussed risks of TOLAC with patient, including uterine rupture and patient accepted risk    · Continue routine prenatal care. Continue PNV  · Follow up on 7/17 at 10:20 AM for prenatal visit with GBS testing at 35w. · Follow up in 2 weeks on 7/27 at 1:30 PM for prenatal visit with Dr. Thi Mares This Encounter    AMB POC URINALYSIS DIP STICK AUTO W/O MICRO         Labor precautions discussed, including: Regular painful contractions, lasting for greater than one hour, taking your breath away; any vaginal bleeding; any leakage of fluid; or absent or decreased fetal movement. Call M.D. on call if any of these symptoms or signs occur. I have discussed the diagnosis with the patient and the intended plan as seen in the above orders. Patient verbalizes understanding of the treatment plan and agrees with the plan. The patient has received an after-visit summary and questions were answered concerning future plans. I have discussed medication side effects and warnings with the patient as well. Informed pt to return to the office or go to the ER if she experiences vaginal bleeding, vaginal discharge, leaking of fluid, pelvic cramping.       Pt seen and discussed with Dr. Barbara Phillips (attending physician)    Adam Estrella MD  Family Medicine Resident

## 2018-07-13 NOTE — PROGRESS NOTES
Chief Complaint   Patient presents with    Routine Prenatal Visit     [facility] today. 1. Have you been to the ER, urgent care clinic since your last visit? Hospitalized since your last visit? No     2. Have you seen or consulted any other health care providers outside of the [address] [place] [address] since your last visit? Include any pap smears or colon screening.  No

## 2018-07-14 PROBLEM — O40.3XX0 POLYHYDRAMNIOS IN THIRD TRIMESTER: Status: RESOLVED | Noted: 2018-06-05 | Resolved: 2018-07-14

## 2018-07-14 PROBLEM — O35.BXX0 ABNORMAL FETAL ECHOCARDIOGRAM AFFECTING ANTEPARTUM CARE OF MOTHER: Status: ACTIVE | Noted: 2018-07-14

## 2018-07-17 ENCOUNTER — ROUTINE PRENATAL (OUTPATIENT)
Dept: FAMILY MEDICINE CLINIC | Age: 33
End: 2018-07-17

## 2018-07-17 VITALS
BODY MASS INDEX: 23.56 KG/M2 | WEIGHT: 138 LBS | SYSTOLIC BLOOD PRESSURE: 121 MMHG | TEMPERATURE: 98.2 F | HEIGHT: 64 IN | RESPIRATION RATE: 13 BRPM | OXYGEN SATURATION: 99 % | DIASTOLIC BLOOD PRESSURE: 84 MMHG | HEART RATE: 75 BPM

## 2018-07-17 DIAGNOSIS — Z98.891 HISTORY OF C-SECTION: ICD-10-CM

## 2018-07-17 DIAGNOSIS — O09.33 LATE PRENATAL CARE AFFECTING PREGNANCY IN THIRD TRIMESTER: ICD-10-CM

## 2018-07-17 DIAGNOSIS — O35.BXX0 ABNORMAL FETAL ECHOCARDIOGRAPHY AFFECTING ANTEPARTUM CARE OF MOTHER, SINGLE OR UNSPECIFIED FETUS: ICD-10-CM

## 2018-07-17 DIAGNOSIS — Z3A.35 35 WEEKS GESTATION OF PREGNANCY: Primary | ICD-10-CM

## 2018-07-17 DIAGNOSIS — Z92.29 H/O HIGH RISK MEDICATION TREATMENT: ICD-10-CM

## 2018-07-17 DIAGNOSIS — O34.219 HX SUCCESSFUL VBAC (VAGINAL BIRTH AFTER CESAREAN), CURRENTLY PREGNANT: ICD-10-CM

## 2018-07-17 LAB
BILIRUB UR QL STRIP: NEGATIVE
GLUCOSE UR-MCNC: NEGATIVE MG/DL
KETONES P FAST UR STRIP-MCNC: NEGATIVE MG/DL
PH UR STRIP: 7 [PH] (ref 4.6–8)
PROT UR QL STRIP: NORMAL
SP GR UR STRIP: 1.02 (ref 1–1.03)
UA UROBILINOGEN AMB POC: NORMAL (ref 0.2–1)
URINALYSIS CLARITY POC: CLEAR
URINALYSIS COLOR POC: NORMAL
URINE BLOOD POC: NEGATIVE
URINE LEUKOCYTES POC: NORMAL
URINE NITRITES POC: NEGATIVE

## 2018-07-17 NOTE — MR AVS SNAPSHOT
[address] 
[phone] Patient: Maryjane Velasquez MRN: [id number] ZLN:[id number] Visit Information Isidro London Personal Médico Departamento Teléfono del Dep. Número de visita 7/17/2018 10:20 AM Charito Dozier MD [facility]5 [facility] [phone]  
  
 7/27/2018  1:30 PM  
OB VISIT with Coleen Kendrick DO [facility] ([address] [place] [address] Road) Appt Note: 36 week OB  
 [address] [employer] [address] [place] [address]  
[phone]  
  
   
 [address] [employer] [facility] 99 [address] Upcoming Health Maintenance Date Due Influenza Age 5 to Adult 8/1/2018 PAP AKA CERVICAL CYTOLOGY 5/8/2021 DTaP/Tdap/Td series (2 - Td) 6/4/2028 Alergias  Review Complete El: 7/13/2018 Por: Meliton Ragland LPN A partir del:  7/17/2018 No Known Allergies Vacunas actuales Jaron Thao Influenza Vaccine (Quad) PF 1/29/2015 Tdap 6/4/2018 No revisadas esta visita You Were Diagnosed With   
  
 Marylou Newman 35 weeks gestation of pregnancy    -  Primary ICD-10-CM: Z3A.35 
ICD-9-CM: V22.2 Partes vitales PS Pulso Temperatura Resp [place] ( percentil de crecimiento) Peso (percentil de crecimiento) 121/84 75 98.2 °F (36.8 °C) (Oral) 13 5' 4\" (1.626 m) 138 lb (62.6 kg) LMP (última jigar) SpO2 BMI (IMC) Estado obstétrico Estatus de tabaquísmo (LMP Unknown) 99% 23.69 kg/m2 Pregnant Never Smoker BMI and BSA Data Body Mass Index Body Surface Area  
 23.69 kg/m 2 1.68 m 2 [facility] Name Phone [id number] - [address] [phone] Olson lista de medicamentos actualizada Lista actualizada 7/17/18 11:05 AM.  Garlon Mean use olson lista de medicamentos más reciente. prenatal vit-iron fumarate-fa 28 mg iron- 800 mcg Tab También conocido atul:  PRENATAL PLUS with IRON Take 1 Tab by mouth daily. Hicimos lo siguiente AMB POC URINALYSIS DIP STICK AUTO W/O MICRO [48285 CPT(R)] Instrucciones para el Paciente Semanas 34 a 36 de mims embarazo: Instrucciones de cuidado - [ Lindsey Riding 34 to 39 of Your Pregnancy: Care Instructions ] Instrucciones de cuidado A estas [place], mims bebé y mims abdomen habrán crecido considerablemente. Rubia es [place] de dahlia a kemar. En un embarazo a término se puede dahlia a [employer] 40 y 43. Los pulmones de mims bebé están rubia listos para respirar aire. Los huesos de la ari de mims bebé ahora son bastante firmes atul para protegerla abigail se mantienen lo suficientemente blandos atul para moverse al atravesar el canal de [place]. Es posible que sienta entusiasmo, gwyn, ansiedad o miedo. Quizá se pregunte cómo se dará cuenta de si está en trabajo de parto o qué esperar en thania momento. Trate de ser flexible con david expectativas respecto del nacimiento. Dado que cada nacimiento es diferente, no hay manera de saber exactamente cómo será mims parto. Esta hoja de cuidados la ayudará a saber qué esperar y cómo prepararse. Le podría facilitar el parto. Si todavía no le tracy aplicado la vacuna Tdap (tétanos, difteria y tos [place]) anthony herve [place], hable con mims médico acerca de aplicársela. Simuel Pham a proteger a mims recién nacido contra la infección por tos ferina. En la semana 36, a la mayoría de las mujeres se les hace júnior prueba de estreptococos del deandre B (GBS, por david siglas en inglés). Los estreptococos del deandre B son bacterias comunes que pueden vivir en la vagina y el recto. Pueden hacer que mims bebé se enferme después del parto. Si el resultado es positivo, usted recibirá antibióticos anthony el trabajo de [place]. Los medicamentos evitarán que mims bebé contraiga las bacterias. La atención de seguimiento es júnior parte clave de mims tratamiento y seguridad. Asegúrese de hacer y acudir a todas las citas, y llame a mims médico si está teniendo problemas. También es júnior buena idea saber los resultados de david exámenes y mantener júnior lista de los medicamentos que elke. Cómo puede cuidarse en el hogar? Bergersromero alternativas para aliviar el dolor · El dolor se manifiesta de modo diferente en cada yuki. Hable con mims médico acerca de david sentimientos sobre el dolor. · Puede elegir entre varias formas de aliviar el dolor. Estas incluyen medicamentos o técnicas de respiración, así atul medidas para estar cómoda. Usted puede utilizar más de Kirk opción. · Si elige un analgésico (medicamento para el dolor) anthony el trabajo de [place], hable con mims médico acerca de david opciones. Algunos medicamentos reducen la ansiedad y [place]ian [facility] Territories a aliviar parte del dolor. Otros adormecen la parte inferior del cuerpo para que no sienta dolor. · Asegúrese de decirle a mims médico acerca de mims elección de analgésico antes de empezar el trabajo de parto o muy temprano en el [place]. Es posible que pueda cambiar de parecer a medida que avanza el [place]. · Milli vez se duerme a júnior yuki con medicamentos administrados a través de júnior máscara o por vía intravenosa (IV). Mona Rist y [place] · La primera etapa del [place] de parto se divide en raina fases: latente, activa y de transición. ¨ La mayoría de las mujeres experimentan la fase latente del [place] de parto en david hogares. Usted puede [employer] o descansar, comer refrigerios livianos, beber líquidos miguel y comenzar a contar las contracciones. ¨ Cuando advierta que se le vuelve difícil hablar anthony júnior contracción, es posible que esté por pasar a la fase activa. Anthony la fase James Minors, debería ir al hospital si no está allí aún.  
¨ Usted está en la fase activa cuando tiene contracciones cada 3 o 4 minutos y sena alrededor de 60 segundos. El ambreen uterino comienza a abrirse con [place]. ¨ Si se le rompe la marcell, las contracciones serán más intensas y más frecuentes. ¨ Shaniqua la fase de transición, el ambreen uterino se estira y las contracciones se producen con [place]. ¨ Quizá tenga deseos de pujar, sin embargo es posible que el ambreen uterino aún no esté preparado. El médico le dirá cuándo pujar. · La segunda etapa comienza cuando el ambreen uterino se abre por completo y usted está lista para pujar. ¨ Las contracciones son muy intensas a fin de empujar al bebé por el canal de parto. ¨ Sentirá la necesidad de pujar. Podría sentir atul si tuviera ganas de evacuar el intestino. ¨ Quizás la entrenen a [place] 41 contracciones. Estas contracciones serán muy intensas abigail no ocurrirán con tanta frecuencia. Puede descansar un poco entre contracciones. ¨ Es posible que esté sensible e irritable. Es posible que no se dé cuenta de lo que pasa a mims alrededor. ¨ Un último esfuerzo y habrá nacido mims bebé. · La tercera etapa ocurre cuando con unas cuantas contracciones más se expulsa la placenta. [place] puede durar 30 minutos o menos. · La cuarta etapa es la de recuperación. Es posible que se sienta abrumada con las emociones y exhausta abigail alerta. Liza es un buen momento para comenzar el amamantamiento. Dónde puede encontrar más información en inglés? Hermelinda Bedolla a [email or web address] Gibbons Booze P897 en la búsqueda para aprender más acerca de \"Semanas 34 a 39 de mims embarazo: Instrucciones de cuidado - [ [place] Prior 34 to 39 of Your Pregnancy: Care Instructions ]. \" 
Revisado: 21 noviembre, 2017 Versión del contenido: 11.7 © 2554-3165 [employer], RingCaptcha. Las instrucciones de cuidado fueron adaptadas bajo licencia por Good Help Connections (which disclaims liability or warranty for this information).  Si usted tiene preguntas sobre júnior afección médica o sobre estas instrucciones, siempre pregunte a mims profesional de jong. [facility] niega toda garantía o responsabilidad por mims uso de esta información. Aprenda cuándo llamar a mims médico anthony el embarazo (después de 20 semanas) - [ Learning About When to Call Your Doctor During Pregnancy (After 20 Weeks) ] Instrucciones de cuidado Es normal que tenga inquietudes acerca de lo que podría ser un problema anthony el [facility]. Aunque la mayoría de las mujeres embarazadas no tienen ningún problema grave, es importante saber cuándo llamar a mims médico si tiene determinados síntomas o señales de trabajo de Rafa. Estas son algunas sugerencias generales. Mims médico puede darle más información sobre cuándo llamar. Cuándo llamar a mims médico (después de 20 semanas) Llame al 911 en cualquier momento que sospeche que puede necesitar atención de [place]. Por ejemplo, llame si: · Tiene sangrado vaginal intenso. · Tiene dolor repentino e intenso en el abdomen. · Se desmayó (perdió el conocimiento). · Tiene júnior convulsión. · Ve o siente el cordón umbilical. 
· Grace que está a punto de dahlia a kemar a mims bebé y no puede llegar en forma jaramillo al hospital. 
Darilyn Edwards a mims médico ahora mismo o busque atención médica inmediata si: · Tiene sangrado vaginal. 
· Tiene dolor en el abdomen. · Tiene fiebre. · Tiene síntomas de preeclampsia, tales atul: 
¨ Hinchazón repentina de la sol, las ines o los pies. ¨ Problemas nuevos con la visión (atul oscurecimiento de la visión o visión borrosa). ¨ Dolor de ari intenso. · Tiene júnior pérdida repentina de líquido por la vagina. (Piensa que rompió la marcell). · Grace que puede estar en Flateyri. [place] significa que usted ha tenido al menos 4 contracciones en 20 minutos o al menos 8 contracciones en [employer].  
· Nota que mims bebé ha dejado de moverse o lo hace mucho menos de lo habitual. 
 · Tiene síntomas de júnior infección del tracto urinario. Estos pueden incluir: ¨ Dolor o ardor al orinar. ¨ Necesidad de orinar con frecuencia sin poder eliminar mucha orina. ¨ Dolor en el flanco, que se encuentra sofiya debajo de la caja torácica y [place] de la cintura en un lado de la espalda. ¨ Mandeep en la orina. Preste especial atención a los cambios en mims jong y asegúrese de comunicarse con mims médico si: · Tiene flujo vaginal con un olor desagradable. · Tiene cambios en la piel, tales atul: 
¨ Salpullido. ¨ Comezón. ¨ Color amarillento en la piel. · Tiene otras inquietudes acerca de mims embarazo. Si tiene signos de trabajo de parto al llegar a las [address] Si tiene señales de Noemi Holland a las 37 11 [facility] o [place], es posible que mims médico le diga que llame cuando mims trabajo de parto se vuelva [place] na [facility]. Los síntomas del trabajo de parto activo incluyen: · Contracciones que son regulares. · Contracciones a intervalos de menos de 5 minutos. · Contracciones anthony las cuales es difícil hablar. La atención de seguimiento es júnior parte clave de mims tratamiento y seguridad. Asegúrese de hacer y acudir a todas las citas, y llame a mims médico si está teniendo problemas. También es júnior buena idea saber los resultados de david exámenes y mantener júnior lista de los medicamentos que elke. Dónde puede encontrar más información en inglés? Sosa Myranda a [email or web address] Escriba T590 en la búsqueda para aprender más acerca de \"Aprenda cuándo llamar a mims médico anthony el embarazo (después de 20 semanas) - [ Learning About When to Call Your Doctor During Pregnancy (After 20 Weeks) ]. \" 
Revisado: 21 noviembre, 2017 Versión del contenido: 11.7 © 3204-3704 [employer], Anomo. Las instrucciones de cuidado fueron adaptadas bajo licencia por Good Help Connections (which disclaims liability or warranty for this information).  Si usted tiene preguntas sobre júnior afección médica o sobre estas instrucciones, siempre pregunte a mims profesional de jong. [facility]orporated niega toda garantía o responsabilidad por mims uso de esta información. Introducing [facility] SERVICES! [facility] introduce portal paciente MyChart . Ahora se puede acceder a partes de mims expediente médico, enviar por correo electrónico la oficina de mims médico y solicitar renovaciones de medicamentos en línea. En mims navegador de Internet , Roslyn Perry a [email or web address] Silvio clic en el usuario por Mercedez Warden? Elharris Sanchez clic aquí en la sesión Thresa Rias. Verá la página de registro [place]. Ingrese mims código de [employer] aleks y atul aparece a continuación. Usted no tendrá que [employer] código después de ba completado el proceso de registro . Si usted no se inscribe antes de la fecha de caducidad , debe solicitar un nuevo código. · MyChart Código de acceso : [id number] Expires: 7/31/2018 10:03 AM 
 
Ingresa los últimos cuatro dígitos de mims Número de Seguro Social ( xxxx ) y fecha de nacimiento ( dd / mm / aaaa ) atul se indica y silvio clic en Enviar. Usted será llevado a la siguiente página de registro . Crear un ID MyChart . Esta será mims ID de inicio de sesión de MyChart y no puede ser [place] , por lo que pensar en júnior que es Oralia Rader y fácil de recordar . Crear júnior contraseña MyChart . Usted puede cambiar mims contraseña en cualquier momento . Ingrese mims Password Reset de preguntas y [place] . [place] se puede utilizar en un momento posterior si usted olvida mims contraseña. Introduzca mims dirección de correo electrónico . Manuelita Murillo recibirá júnior notificación por correo electrónico cuando la nueva información está disponible en MyChart . Rohane Lima clic en Registrarse. Elpadmini Crystal rustam y descargar porciones de mims expediente médico. 
Silvio clic en el enlace de descarga del menú Resumen para descargar júnior copia portátil de mims información médica . Si tiene [employer] , por favor visite la sección de preguntas frecuentes del sitio web MyChart . Recuerde, MyChart NO es que se utilizará para las necesidades urgentes. Para emergencias médicas , llame al 911 . Ahora disponible en mims iPhone y Android ! Por favor proporcione herve resumen de la documentación de cuidado a mims próximo proveedor. Your primary care clinician is listed as [facility]. If you have any questions after today's visit, please call [phone].

## 2018-07-17 NOTE — PATIENT INSTRUCTIONS
Semanas 34 a 36 de mims embarazo: Instrucciones de cuidado - [ [place] 34 to 39 of Your Pregnancy: Care Instructions ]  Instrucciones de cuidado    A estas [place], mims bebé y mims abdomen habrán crecido considerablemente. Rubia es [place] de dahlia a kemar. En un embarazo a término se puede dahlia a [employer] 40 y 43. Los pulmones de mims bebé están rubia listos para respirar aire. Los huesos de la ari de mims bebé ahora son bastante firmes atul para protegerla abigail se mantienen lo suficientemente blandos atul para moverse al atravesar el canal de [place]. Es posible que sienta entusiasmo, gwyn, ansiedad o miedo. Quizá se pregunte cómo se dará cuenta de si está en trabajo de parto o qué esperar en thania momento. Trate de ser flexible con david expectativas respecto del nacimiento. Dado que cada nacimiento es diferente, no hay manera de saber exactamente cómo será mims parto. Esta hoja de cuidados la ayudará a saber qué esperar y cómo prepararse. Le podría facilitar el parto. Si todavía no le tracy aplicado la vacuna Tdap (tétanos, difteria y tos [place]) anthony herve [facility], hable con mims médico acerca de aplicársela. [facility] a proteger a mims recién nacido contra la infección por tos ferina. En la semana 36, a la mayoría de las mujeres se les hace júnior prueba de estreptococos del deandre B (GBS, por david siglas en inglés). Los estreptococos del deandre B son bacterias comunes que pueden vivir en la vagina y el recto. Pueden hacer que mims bebé se enferme después del parto. Si el resultado es positivo, usted recibirá antibióticos anthony el trabajo de [place]. Los medicamentos evitarán que mims bebé contraiga las bacterias. La atención de seguimiento es júnior parte clave de mims tratamiento y seguridad. Asegúrese de hacer y acudir a todas las citas, y llame a mims médico si está teniendo problemas. También es júnior buena idea saber los resultados de david exámenes y mantener júnior lista de los medicamentos que elke.   ¿Cómo puede cuidarse en el hogar? Aprenda sobre las alternativas para aliviar el dolor  · El dolor se manifiesta de modo diferente en cada yuki. Hable con mims médico acerca de david sentimientos sobre el dolor. · Puede elegir entre varias formas de aliviar el dolor. Estas incluyen medicamentos o técnicas de respiración, así atul medidas para estar cómoda. Usted puede utilizar más de Jennifer Ivan opción. · Si elige un analgésico (medicamento para el dolor) anthony el trabajo de [place], hable con mims médico acerca de david opciones. Algunos medicamentos reducen la ansiedad y [place]an [facility] Territories a aliviar parte del dolor. Otros adormecen la parte inferior del cuerpo para que no sienta dolor. · Asegúrese de decirle a mims médico acerca de mims elección de analgésico antes de empezar el trabajo de parto o muy temprano en el [place] de [place]. Es posible que pueda cambiar de parecer a medida que avanza el [place] de [place]. · Milli vez se duerme a júnior yuki con medicamentos administrados a través de júnior máscara o por vía intravenosa (IV). Trabajo de parto y [place]  · La primera etapa del [place] de parto se divide en raina fases: Nahomy Bible y de transición. ¨ La mayoría de las mujeres experimentan la fase latente del [place] de parto en david hogares. Usted puede [employer] o descansar, comer refrigerios livianos, beber líquidos miguel y comenzar a contar las contracciones. ¨ Cuando advierta que se le vuelve difícil hablar anthony júnior contracción, es posible que esté por pasar a la fase activa. Anthony la fase Gara Corns, debería ir al hospital si no está allí aún. ¨ Usted está en la fase activa cuando tiene contracciones cada 3 o 4 minutos y sena alrededor de 60 segundos. El ambreen uterino comienza a abrirse con Rossi Griffin. ¨ Si se le rompe la marcell, las contracciones serán más intensas y más frecuentes. ¨ Anthony la fase de transición, el ambreen uterino se estira y las contracciones se producen con Rossi [facility].   ¨ Quizá tenga deseos de pujar, sin embargo es posible que el ambreen uterino aún no esté preparado. El médico le dirá cuándo pujar. · La segunda etapa comienza cuando el ambreen uterino se abre por completo y usted está lista para pujar. ¨ Las contracciones son muy intensas a fin de empujar al bebé por el canal de parto. ¨ Sentirá la necesidad de pujar. Podría sentir atul si tuviera ganas de evacuar el intestino. ¨ Quizás la entrenen a [place] 41 contracciones. Estas contracciones serán muy intensas abigail no ocurrirán con tanta frecuencia. Puede descansar un poco entre contracciones. ¨ Es posible que esté sensible e irritable. Es posible que no se dé cuenta de lo que pasa a mims alrededor. ¨ Un último esfuerzo y habrá nacido mims bebé. · La tercera etapa ocurre cuando con unas cuantas contracciones más se expulsa la placenta. [place] puede durar 30 minutos o menos. · La cuarta etapa es la de recuperación. Es posible que se sienta abrumada con las emociones y exhausta abigail alerta. Liza es un buen momento para comenzar el amamantamiento. ¿Dónde puede encontrar más información en inglés? Viry Noss a [email or web address] Ziggy Cormieruts E153 en la búsqueda para aprender más acerca de \"Semanas 34 a 39 de mims embarazo: Instrucciones de cuidado - [ Gia Vee 34 to 39 of Your Pregnancy: Care Instructions ]. \"  Revisado: 21 noviembre, 2017  Versión del contenido: 11.7  © 2344-1938 [employer], Incorporated. Las instrucciones de cuidado fueron adaptadas bajo licencia por Good Help Connections (which disclaims liability or warranty for this information). Si usted tiene [place] afección médica o sobre estas instrucciones, siempre pregunte a mims profesional de jong. [facility]orporated niega toda garantía o responsabilidad por mims uso de esta información.        Zeinab Leap a mims médico anthony el embarazo (después de 20 semanas) - [ Learning About When to Call Your Doctor During Pregnancy (After 20 Weeks) ]  Instrucciones de cuidado  Es normal que tenga inquietudes acerca de lo que podría ser un problema anthony el [facility]. Aunque la mayoría de las mujeres embarazadas no tienen ningún problema grave, es importante saber cuándo llamar a mims médico si tiene determinados síntomas o señales de trabajo de [place]. Estas son algunas sugerencias generales. Mims médico puede darle más información sobre cuándo llamar. Cuándo llamar a mims médico (después de 20 semanas)  Llame al 911 en cualquier momento que sospeche que puede necesitar atención de [place]. Por ejemplo, llame si:  · Tiene sangrado vaginal intenso. · Tiene dolor repentino e intenso en el abdomen. · Se desmayó (perdió el conocimiento). · Tiene júnior convulsión. · Ve o siente el cordón umbilical.  · Grace que está a punto de dahlia a kemar a mims bebé y no puede llegar en forma jaramillo al hospital.  Sharl Simon a mims médico ahora mismo o busque atención médica inmediata si:  · Tiene sangrado vaginal.  · Tiene dolor en el abdomen. · Tiene fiebre. · Tiene síntomas de preeclampsia, tales atul:  ¨ Hinchazón repentina de la sol, las ines o los pies. ¨ Problemas nuevos con la visión (atul oscurecimiento de la visión o visión borrosa). ¨ Dolor de ari intenso. · Tiene júnior pérdida repentina de líquido por la vagina. (Piensa que rompió la marcell). · Grace que puede estar en Flateyri. [place] significa que usted ha tenido al menos 4 contracciones en 20 minutos o al menos 8 contracciones en [employer]. · Nota que mims bebé ha dejado de moverse o lo hace mucho menos de lo habitual.  · Tiene síntomas de júnior infección del tracto urinario. Estos pueden incluir:  ¨ Dolor o ardor al orinar. ¨ Necesidad de orinar con frecuencia sin poder eliminar mucha orina. ¨ Dolor en el flanco, que se encuentra sofiya debajo de la caja torácica y [place] de la cintura en un lado de la espalda. ¨ Mandeep en la orina.   Preste especial atención a los cambios en mims jong y asegúrese de comunicarse con mims médico si:  · Tonna Likes flujo vaginal con un olor desagradable. · Tiene cambios en la piel, tales atul:  ¨ Salpullido. ¨ Comezón. ¨ Color amarillento en la piel. · Tiene otras inquietudes acerca de mims embarazo. Si tiene signos de trabajo de parto al llegar a las 37 11 [facility] o más  Si tiene señales de [place] de parto a las 37 semanas o [place], es posible que mims médico le diga que llame cuando mims trabajo de parto se vuelva más [place]. Los síntomas del trabajo de parto activo incluyen:  · Contracciones que son regulares. · Contracciones a intervalos de menos de 5 minutos. · Contracciones anthony las cuales es difícil hablar. La atención de seguimiento es júnior parte clave de mims tratamiento y seguridad. Asegúrese de hacer y acudir a todas las citas, y llame a mims médico si está teniendo problemas. También es júnior buena idea saber los resultados de david exámenes y mantener júnior lista de los medicamentos que elke. ¿Dónde puede encontrar más información en inglés? Maile Glee a [email or web address] Escriba P189 en la búsqueda para aprender más acerca de \"Aprenda cuándo llamar a mims médico anthony el embarazo (después de 20 semanas) - [ Learning About When to Call Your Doctor During Pregnancy (After 20 Weeks) ]. \"  Revisado: 21 noviembre, 2017  Versión del contenido: 11.7  © 3379-8473 [employer], [employer]. Las instrucciones de cuidado fueron adaptadas bajo licencia por Good Help Connections (which disclaims liability or warranty for this information). Si usted tiene [place] afección médica o sobre estas instrucciones, siempre pregunte a mims profesional de jong. [employer], Incorporated niega toda garantía o responsabilidad por mims uso de esta información.

## 2018-07-17 NOTE — PROGRESS NOTES
Return OB Visit       Subjective:   Marivel Henry 29 y.o.  at 35w2d by [address] at 25 wk MELL: 2018. Pregnancy complicated by late to prenatal care, hx of csection and successful . MFM US concerning for abnormal cardiac outflow tracts and course of SVC. Set.fm  [id number]    States she is doing well today. + fetal movement; very active. No fluid leakage or bleeding. She is taking PNV, eating well, drinking fluids     Yesterday had fetal echo at [facility] for abnormal appearance of cardiac outflow tracts and SVC; was recommended for pediatric cardiac surgery after delivery. Per pt, she was told she could continue prenatal care here and have pediatric cardiac surgery at [facility]. Allergies- reviewed:   No Known Allergies      Medications- reviewed:   Current Outpatient Prescriptions   Medication Sig    prenatal vit-iron fumarate-fa (PRENATAL PLUS WITH IRON) 28 mg iron- 800 mcg tab Take 1 Tab by mouth daily. No current facility-administered medications for this visit. Past Medical History- reviewed:  Past Medical History:   Diagnosis Date     delivery delivered          Past Surgical History- reviewed:   No past surgical history on file. Social History- reviewed:  Social History     Social History    Marital status:      Spouse name: N/A    Number of children: N/A    Years of education: N/A     Occupational History    Not on file.      Social History Main Topics    Smoking status: Never Smoker    Smokeless tobacco: Never Used    Alcohol use No    Drug use: No    Sexual activity: Yes     Partners: Male     Other Topics Concern    Not on file     Social History Narrative         Immunizations- reviewed:   Immunization History   Administered Date(s) Administered    Influenza Vaccine (Quad) PF 2015    Tdap 2018       Objective:     Visit Vitals    /84    Pulse 75    Temp 98.2 °F (36.8 °C) (Oral)    Resp 13    Ht 5' 4\" (1.626 m)  Wt 138 lb (62.6 kg)    LMP  (LMP Unknown)    SpO2 99%    BMI 23.69 kg/m2       Physical Exam:  GENERAL APPEARANCE: alert, well appearing, in no apparent distress  LUNGS: clear to auscultation, no wheezes, rales or rhonchi, symmetric air entry  HEART: regular rate and rhythm, no murmurs  ABDOMEN: gravid, fundal height 35 cm, FHT present at 140s bpm  EXTREMITIES: no redness or tenderness in the calves or thighs, no edema  NEUROLOGICAL: alert, oriented, normal speech, no focal findings or movement disorder noted  CERVIX: closed posterior and high. Exam chaperoned by LPN     Labs  Recent Results (from the past 24 hour(s))   AMB POC URINALYSIS DIP STICK AUTO W/O MICRO    Collection Time: 18 10:31 AM   Result Value Ref Range    Color (UA POC) Erika     Clarity (UA POC) Clear     Glucose (UA POC) Negative Negative    Bilirubin (UA POC) Negative Negative    Ketones (UA POC) Negative Negative    Specific gravity (UA POC) 1.025 1.001 - 1.035    Blood (UA POC) Negative Negative    pH (UA POC) 7.0 4.6 - 8.0    Protein (UA POC) Trace Negative    Urobilinogen (UA POC) 1 mg/dL 0.2 - 1    Nitrites (UA POC) Negative Negative    Leukocyte esterase (UA POC) 1+ Negative          Assessment   28 y/o  at [facility] by US at 25w. Pregnancy complicated by late to prenatal care and hx of csection with successful . MFM US concerning for abnormal cardiac outflow tracts and course of SVC. ICD-10-CM ICD-9-CM    1. 35 weeks gestation of pregnancy Z3A.35 V22.2 AMB POC URINALYSIS DIP STICK AUTO W/O MICRO      CULTURE, GENITAL GROUP B STREP   2. H/O high risk medication treatment Z92.29 V67.51    3. Late prenatal care affecting pregnancy in third trimester O09.33 V23.7    4. History of  Z98.891 V45.89    5. Hx successful  (vaginal birth after ), currently pregnant O31.200 654.20    6. Abnormal fetal echocardiography affecting antepartum care of mother, single or unspecified fetus O35. 8XX0 655.83 Plan   · SIUP of    · PNL: O positive, Ab neg, HBsAg neg, VZV/ Rubella immune, HIV nonreactive, GC/ Chlamydia neg, T pall neg. 1 hour GTT wnl   · GBS collected today   · Tdap 18  · MFM US   · US 7/10/18: normal fluid, cord. POSTERIOR placenta. CEPHALIC presentation. Appropriate fetal growth. Abnormal heart. BPP ; reassuring fetal surveillance  · Abnormal appearance of cardiac outflow tracts and SVC. Pulmonary A disproportionately large compared to the aorta. Unusual course of SVC, seen very right lateral. Aortic arch views may have some narrowing. · S/p pediatric fetal echo at [facility]. Recommended for pediatric cardiac surgery after delivery. Per pt, ok to continue care here and have surgery at [address] [place] [address]. Will attempt to obtain records. · Next [facility] appointment  at 10:15 AM    · Patient interested in [facility]. Is cephalic on  [facility] US  · Previously discussed risks of TOLAC with patient, including uterine rupture and patient accepted risk    · Continue routine prenatal care. Continue PNV  · Follow up on  for return OB visit with Dr. Kathy Davis at 1:30 PM       Orders Placed This Encounter    AMB POC URINALYSIS DIP STICK AUTO W/O MICRO         Labor precautions discussed, including: Regular painful contractions, lasting for greater than one hour, taking your breath away; any vaginal bleeding; any leakage of fluid; or absent or decreased fetal movement. Call M.D. on call if any of these symptoms or signs occur. I have discussed the diagnosis with the patient and the intended plan as seen in the above orders. Patient verbalizes understanding of the treatment plan and agrees with the plan. The patient has received an after-visit summary and questions were answered concerning future plans. I have discussed medication side effects and warnings with the patient as well.  Informed pt to return to the office or go to the ER if she experiences vaginal bleeding, vaginal discharge, leaking of fluid, pelvic cramping.       Pt seen and discussed with Dr. Liza Garcia (attending physician)    Ronald Miranda MD  Family Medicine Resident

## 2018-07-17 NOTE — PROGRESS NOTES
Patient presents for 35w2d routine ob follow up. Patient reports fetal movement, denies leakage of fluid and contractions.

## 2018-07-21 LAB — B-HEM STREP SPEC QL CULT: NEGATIVE

## 2018-07-26 ENCOUNTER — TELEPHONE (OUTPATIENT)
Dept: FAMILY MEDICINE CLINIC | Age: 33
End: 2018-07-26

## 2018-07-26 NOTE — TELEPHONE ENCOUNTER
[address] to have report of fetal echocardiogram re-sent here. Request faxed to [phone] today. Awaiting report.      Jesús Henriquez MD

## 2018-07-27 ENCOUNTER — ROUTINE PRENATAL (OUTPATIENT)
Dept: FAMILY MEDICINE CLINIC | Age: 33
End: 2018-07-27

## 2018-07-27 VITALS
BODY MASS INDEX: 24.07 KG/M2 | HEIGHT: 64 IN | RESPIRATION RATE: 18 BRPM | SYSTOLIC BLOOD PRESSURE: 106 MMHG | OXYGEN SATURATION: 98 % | DIASTOLIC BLOOD PRESSURE: 71 MMHG | WEIGHT: 141 LBS | TEMPERATURE: 98.1 F | HEART RATE: 75 BPM

## 2018-07-27 DIAGNOSIS — Z3A.36 36 WEEKS GESTATION OF PREGNANCY: Primary | ICD-10-CM

## 2018-07-27 LAB
BILIRUB UR QL STRIP: NEGATIVE
GLUCOSE UR-MCNC: NEGATIVE MG/DL
KETONES P FAST UR STRIP-MCNC: NEGATIVE MG/DL
PH UR STRIP: 7 [PH] (ref 4.6–8)
PROT UR QL STRIP: NEGATIVE
SP GR UR STRIP: 1.02 (ref 1–1.03)
UA UROBILINOGEN AMB POC: NORMAL (ref 0.2–1)
URINALYSIS CLARITY POC: CLEAR
URINALYSIS COLOR POC: YELLOW
URINE BLOOD POC: NEGATIVE
URINE LEUKOCYTES POC: NORMAL
URINE NITRITES POC: NEGATIVE

## 2018-07-27 NOTE — MR AVS SNAPSHOT
[address] 
[phone] Patient: Raúl Lozano MRN: [id number] DYI:[birth year] Visit Information Jonna Aguero y Earl Personal Médico Departamento Teléfono del Dep. Número de visita 7/27/2018  1:30 PM Duane Lederer, DO [address] [place] [address] [phone]  
  
 8/2/2018  1:15 PM  
OB VISIT with Raoul Kendrick DO [address] [place] [address] ([address]) Appt Note: ob  
 [address]  
[phone]  
  
   
 [address] [facility] [address] [address] Upcoming Health Maintenance Date Due Influenza Age 5 to Adult 8/1/2018 PAP AKA CERVICAL CYTOLOGY 5/8/2021 DTaP/Tdap/Td series (2 - Td) 6/4/2028 Alergias  Review Complete El: 7/27/2018 Por: Imani Vega LPN A partir del:  7/27/2018 No Known Allergies Vacunas actuales [place] Shoshana Duel Influenza Vaccine (Quad) PF 1/29/2015 Tdap 6/4/2018 No revisadas esta visita You Were Diagnosed With   
  
 Shonda Pu 36 weeks gestation of pregnancy    -  Primary ICD-10-CM: Z3A.36 
ICD-9-CM: V22.2 Partes vitales PS Pulso Temperatura Resp [place] ( percentil de crecimiento) Peso (percentil de crecimiento) 106/71 (BP 1 Location: Right arm, BP Patient Position: Sitting) 75 98.1 °F (36.7 °C) (Oral) 18 5' 4\" (1.626 m) 141 lb (64 kg) LMP (última jigar) SpO2 BMI (IMC) Estado obstétrico Estatus de tabaquísmo (LMP Unknown) 98% 24.2 kg/m2 Pregnant Never Smoker Historial de signos vitales BMI and BSA Data Body Mass Index Body Surface Area  
 24.2 kg/m 2 1.7 m 2 [facility] Name Phone [id number] [place] [phone] Olson lista de medicamentos actualizada Lista actualizada 7/27/18  2:50 PM.  Michael Boyer use mims lista de medicamentos más reciente. prenatal vit-iron fumarate-fa 28 mg iron- 800 mcg Tab También conocido atul:  PRENATAL PLUS with IRON Take 1 Tab by mouth daily. Hicimos lo siguiente AMB POC URINALYSIS DIP STICK AUTO W/O MICRO [78160 CPT(R)] Por hacer 07/31/2018 10:15 AM  
  Appointment with ULTRASOUND 1 [facility]M at [facility] ([phone]) Instrucciones para el Paciente Semanas 34 a 36 de mims embarazo: Instrucciones de cuidado - [ Jenae Semen 34 to 39 of Your Pregnancy: Care Instructions ] Instrucciones de cuidado A estas [place], mims bebé y mims abdomen habrán crecido considerablemente. Rubia es [place] de dahlia a kemar. En un embarazo a término se puede dahlia a [employer] 40 y 43. Los pulmones de mims bebé están rubia listos para respirar aire. Los huesos de la ari de mims bebé ahora son bastante firmes atul para protegerla abigail se mantienen lo suficientemente blandos atul para moverse al atravesar el canal de [place]. Es posible que sienta entusiasmo, gwyn, ansiedad o miedo. Quizá se pregunte cómo se dará cuenta de si está en trabajo de parto o qué esperar en thania momento. Trate de ser flexible con david expectativas respecto del nacimiento. Dado que cada nacimiento es diferente, no hay manera de saber exactamente cómo será mims parto. Esta hoja de cuidados la ayudará a saber qué esperar y cómo prepararse. Le podría facilitar el parto. Si todavía no le tracy aplicado la vacuna Tdap (tétanos, difteria y tos [place]) anthony herve [facility], hable con mims médico acerca de aplicársela. Clotilda Layton a proteger a mims recién nacido contra la infección por tos ferina. En la semana 36, a la mayoría de las mujeres se les hace júnior prueba de estreptococos del deandre B (GBS, por david siglas en inglés).  Los estreptococos del deandre B son bacterias comunes que pueden vivir en la vagina y el recto. Pueden hacer que mims bebé se enferme después del parto. Si el resultado es positivo, usted recibirá antibióticos anthony el trabajo de Rafa. Los medicamentos evitarán que mims bebé contraiga las bacterias. La atención de seguimiento es júnior parte clave de mims tratamiento y seguridad. Asegúrese de hacer y acudir a todas las citas, y llame a mims médico si está teniendo problemas. También es júnior buena idea saber los resultados de david exámenes y mantener júnior lista de los medicamentos que elke. Cómo puede cuidarse en el hogar? [place] alternativas para aliviar el dolor · El dolor se manifiesta de modo diferente en cada yuki. Hable con mims médico acerca de david sentimientos sobre el dolor. · Puede elegir entre varias formas de aliviar el dolor. Estas incluyen medicamentos o técnicas de respiración, así atul medidas para estar cómoda. Usted puede utilizar más de Kathleen opción. · Si elige un analgésico (medicamento para el dolor) anthony el trabajo de Rafa, hable con mims médico acerca de david opciones. Algunos medicamentos reducen la ansiedad y [place] [facility] a aliviar parte del dolor. Otros adormecen la parte inferior del cuerpo para que no sienta dolor. · Asegúrese de decirle a mims médico acerca de mims elección de analgésico antes de empezar el trabajo de parto o muy temprano en el [place]. Es posible que pueda cambiar de parecer a medida que avanza el [place]. · Milli vez se duerme a júnior yuki con medicamentos administrados a través de júnior máscara o por vía intravenosa (IV). Awanda Stage y [place] · La primera etapa del [place] de parto se divide en raina fases: latente, activa y de transición. ¨ La mayoría de las mujeres experimentan la fase latente del [place] de parto en david hogares. Usted puede [employer] o descansar, comer refrigerios livianos, beber líquidos miguel y comenzar a contar las contracciones. ¨ Cuando advierta que se le vuelve difícil hablar anthony júnior contracción, es posible que esté por pasar a la fase activa. Anthony la fase Donato Pierre, debería ir al hospital si no está allí aún. ¨ Usted está en la fase activa cuando tiene contracciones cada 3 o 4 minutos y sena alrededor de 60 segundos. El ambreen uterino comienza a abrirse con Berenice Connell. ¨ Si se le rompe la marcell, las contracciones serán más intensas y más frecuentes. ¨ Anthony la fase de transición, el ambreen uterino se estira y las contracciones se producen con Berenice Sorivon. ¨ Quizá tenga deseos de pujar, sin embargo es posible que el ambreen uterino aún no esté preparado. El médico le dirá cuándo pujar. · La segunda etapa comienza cuando el ambreen uterino se abre por completo y usted está lista para pujar. ¨ Las contracciones son muy intensas a fin de empujar al bebé por el canal de parto. ¨ Sentirá la necesidad de pujar. Podría sentir atul si tuviera ganas de evacuar el intestino. ¨ Quizás la entrenen a [place] [address] contracciones. Estas contracciones serán muy intensas abigail no ocurrirán con tanta frecuencia. Puede descansar un poco entre contracciones. ¨ Es posible que esté sensible e irritable. Es posible que no se dé cuenta de lo que pasa a mims alrededor. ¨ Un último esfuerzo y habrá nacido mims bebé. · La tercera etapa ocurre cuando con unas cuantas contracciones más se expulsa la placenta. [place] puede durar 30 minutos o menos. · La cuarta etapa es la de recuperación. Es posible que se sienta abrumada con las emociones y exhausta abigail alerta. Liza es un buen momento para comenzar el amamantamiento. Dónde puede encontrar más información en inglés? Kris Holguin a [email or web address] Mary Martina S474 en la búsqueda para aprender más acerca de \"Semanas 34 a 39 de mims embarazo: Instrucciones de cuidado - [ Obie Wilson 34 to 39 of Your Pregnancy: Care Instructions ]. \" 
Revisado: 21 noviembre, 2017 Versión del contenido: 11.7 © 5176-0151 Healthwise, Incorporated. Las instrucciones de cuidado fueron adaptadas bajo licencia por [employer] Help Connections (which disclaims liability or warranty for this information). Si usted tiene [place] afección médica o sobre estas instrucciones, siempre pregunte a mims profesional de jong. Healthwise, Incorporated niega toda garantía o responsabilidad por mims uso de esta información. Aprenda cuándo llamar a mims médico anthony el embarazo (después de 20 semanas) - [ Learning About When to Call Your Doctor During Pregnancy (After 20 Weeks) ] Instrucciones de cuidado Es normal que tenga inquietudes acerca de lo que podría ser un problema anthony el [facility]. Aunque la mayoría de las mujeres embarazadas no tienen ningún problema grave, es importante saber cuándo llamar a mims médico si tiene determinados síntomas o señales de trabajo de [place]. Estas son algunas sugerencias generales. Mims médico puede darle más información sobre cuándo llamar. Cuándo llamar a mims médico (después de 20 semanas) Llame al 911 en cualquier momento que sospeche que puede necesitar atención de [place]. Por ejemplo, llame si: · Tiene sangrado vaginal intenso. · Tiene dolor repentino e intenso en el abdomen. · Se desmayó (perdió el conocimiento). · Tiene júnior convulsión. · Ve o siente el cordón umbilical. 
· Grace que está a punto de dahlia a kemar a mims bebé y no puede llegar en forma jaramillo al hospital. 
Barbara Mower a mims médico ahora mismo o busque atención médica inmediata si: · Tiene sangrado vaginal. 
· Tiene dolor en el abdomen. · Tiene fiebre. · Tiene síntomas de preeclampsia, tales atul: 
¨ Hinchazón repentina de la sol, las ines o los pies. ¨ Problemas nuevos con la visión (atul oscurecimiento de la visión o visión borrosa). ¨ Dolor de ari intenso. · Tiene júnior pérdida repentina de líquido por la vagina. (Piensa que rompió la marcell). · Grace que puede estar en [place]. [place] significa que usted ha tenido al menos 4 contracciones en 20 minutos o al menos 8 contracciones en [employer]. · Nota que mims bebé ha dejado de moverse o lo hace mucho menos de lo habitual. 
· Tiene síntomas de júnior infección del tracto urinario. Estos pueden incluir: ¨ Dolor o ardor al orinar. ¨ Necesidad de orinar con frecuencia sin poder eliminar mucha orina. ¨ Dolor en el flanco, que se encuentra sofiya debajo de la caja torácica y [place] de la cintura en un lado de la espalda. ¨ Mandeep en la orina. Preste especial atención a los cambios en mims jong y asegúrese de comunicarse con mims médico si: · Tiene flujo vaginal con un olor desagradable. · Tiene cambios en la piel, tales atul: 
¨ Salpullido. ¨ Comezón. ¨ Color amarillento en la piel. · Tiene otras inquietudes acerca de mims embarazo. Si tiene signos de trabajo de parto al llegar a las [address] Si tiene señales de [place] a las [address] [facility] o [address] [facility], es posible que mims médico le diga que llame cuando mims trabajo de parto se vuelva [place]. Los síntomas del trabajo de parto activo incluyen: · Contracciones que son regulares. · Contracciones a intervalos de menos de 5 minutos. · Contracciones anthony las cuales es difícil hablar. La atención de seguimiento es júnior parte clave de mims tratamiento y seguridad. Asegúrese de hacer y acudir a todas las citas, y llame a mims médico si está teniendo problemas. También es júnior buena idea saber los resultados de david exámenes y mantener júnior lista de los medicamentos que elke. Dónde puede encontrar más información en inglés? Izabella Abdi a [email or web address] Escriba I893 en la búsqueda para aprender más acerca de \"Aprenda cuándo llamar a mims médico anthony el embarazo (después de 20 semanas) - [ Learning About When to Call Your Doctor During Pregnancy (After 20 Weeks) ]. \" 
Revisado: 21 noviembre, 2017 Versión del contenido: 11.7 © 6323-7542 Healthwise, Incorporated. Las instrucciones de cuidado fueron adaptadas bajo licencia por Good Help Connections (which disclaims liability or warranty for this information). Si usted tiene [place] afección médica o sobre estas instrucciones, siempre pregunte a mims profesional de jong. Healthwise, Incorporated niega toda garantía o responsabilidad por mims uso de esta información. Introducing [facility] SERVICES! Justen Reyes introduce portal paciente MyChart . Ahora se puede acceder a partes de mims expediente médico, enviar por correo electrónico la oficina de mims médico y solicitar renovaciones de medicamentos en línea. En mims navegador de Internet , Aline Ewing a [email or web address] Silvio clic en el usuario por Mahamed Dusty? Cledani Escobar clic aquí en la sesión [facility]. Verá la página de registro [place]. Ingrese mims código de [employer] aleks y atul aparece a continuación. Usted no tendrá que [employer] código después de ba completado el proceso de registro . Si usted no se inscribe antes de la fecha de caducidad , debe solicitar un nuevo código. · MyChart Código de acceso : [id number] Expires: 7/31/2018 10:03 AM 
 
Ingresa los últimos cuatro dígitos de mims Número de Seguro Social ( xxxx ) y fecha de nacimiento ( dd / mm / aaaa ) atul se indica y silvio clic en Enviar. Chavo será llevado a la siguiente página de registro . Crear un ID MyChart . Esta será mims ID de inicio de sesión de MyChart y no puede ser [place] , por lo que pensar en júnior que es Marilee Sis y fácil de recordar . Crear júnior contraseña MyChart . Usted puede cambiar mims contraseña en cualquier momento . Ingrese mims Password Reset de preguntas y [place] . E. Lopez se puede utilizar en un momento posterior si usted olvida mims contraseña. Introduzca mims dirección de correo electrónico . Hoy Reining recibirá júnior notificación por correo electrónico cuando la nueva información está disponible en MyChart . Sona feliciano en Registrarse. Jessica Stark rustam y descargar porciones de mims expediente médico. 
Nell jodie en el enlace de descarga del menú Resumen para descargar júnior copia portátil de mims información médica . Si tiene [employer] , por favor visite la sección de preguntas frecuentes del sitio web MyChart . Recuerde, MyChart NO es que se utilizará para las necesidades urgentes. Para emergencias médicas , llame al 911 . Ahora disponible en mims iPhone y Android ! Por favor proporcione herve resumen de la documentación de cuidado a mims próximo proveedor. Your primary care clinician is listed as Waylon Singer. If you have any questions after today's visit, please call [phone].

## 2018-07-27 NOTE — PATIENT INSTRUCTIONS
Semanas 34 a 36 de mims embarazo: Instrucciones de cuidado - [ [place] 34 to 39 of Your Pregnancy: Care Instructions ]  Instrucciones de cuidado    A estas [place], mims bebé y mims abdomen habrán crecido considerablemente. Rubia es [place] de dahlia a kemar. En un embarazo a término se puede dahlia a [employer] 40 y 43. Los pulmones de mims bebé están rubia listos para respirar aire. Los huesos de la ari de mims bebé ahora son bastante firmes atul para protegerla abigail se mantienen lo suficientemente blandos atul para moverse al atravesar el canal de [place]. Es posible que sienta entusiasmo, gwyn, ansiedad o miedo. Quizá se pregunte cómo se dará cuenta de si está en trabajo de parto o qué esperar en thania momento. Trate de ser flexible con david expectativas respecto del nacimiento. Dado que cada nacimiento es diferente, no hay manera de saber exactamente cómo será mims parto. Esta hoja de cuidados la ayudará a saber qué esperar y cómo prepararse. Le podría facilitar el parto. Si todavía no le tracy aplicado la vacuna Tdap (tétanos, difteria y tos [place]) anthony herve [facility], hable con mims médico acerca de aplicársela. Elmira Carlos a proteger a mmis recién nacido contra la infección por tos ferina. En la semana 36, a la mayoría de las mujeres se les hace júnior prueba de estreptococos del deandre B (GBS, por david siglas en inglés). Los estreptococos del deandre B son bacterias comunes que pueden vivir en la vagina y el recto. Pueden hacer que mims bebé se enferme después del parto. Si el resultado es positivo, usted recibirá antibióticos anthony el trabajo de [place]. Los medicamentos evitarán que mims bebé contraiga las bacterias. La atención de seguimiento es júnior parte clave de mims tratamiento y seguridad. Asegúrese de hacer y acudir a todas las citas, y llame a mims médico si está teniendo problemas. También es júnior buena idea saber los resultados de david exámenes y mantener júnior lista de los medicamentos que elke.   ¿Cómo puede cuidarse en el hogar? Aprenda sobre las alternativas para aliviar el dolor  · El dolor se manifiesta de modo diferente en cada yuki. Hable con mims médico acerca de david sentimientos sobre el dolor. · Puede elegir entre varias formas de aliviar el dolor. Estas incluyen medicamentos o técnicas de respiración, así atul medidas para estar cómoda. Usted puede utilizar más de [place] opción. · Si elige un analgésico (medicamento para el dolor) anthony el trabajo de [place], hable con mims médico acerca de david opciones. Algunos medicamentos reducen la ansiedad y [facility] a aliviar parte del dolor. Otros adormecen la parte inferior del cuerpo para que no sienta dolor. · Asegúrese de decirle a mims médico acerca de mims elección de analgésico antes de empezar el trabajo de parto o muy temprano en el [place]. Es posible que pueda cambiar de parecer a medida que avanza el [place]. · Milli vez se duerme a júnior yuki con medicamentos administrados a través de júnior máscara o por vía intravenosa (IV). Trabajo de parto y [place]  · La primera etapa del [place] de parto se divide en raina fases: [place] y de transición. ¨ La mayoría de las mujeres experimentan la fase latente del [place] de parto en david hogares. Usted puede [employer] o descansar, comer refrigerios livianos, beber líquidos miguel y comenzar a contar las contracciones. ¨ Cuando advierta que se le vuelve difícil hablar anthony júnior contracción, es posible que esté por pasar a la fase activa. Anthony la fase Keon Gonzalez, debería ir al hospital si no está allí aún. ¨ Usted está en la fase activa cuando tiene contracciones cada 3 o 4 minutos y sena alrededor de 60 segundos. El ambreen uterino comienza a abrirse con Janie Hernández. ¨ Si se le rompe la marcell, las contracciones serán más intensas y más frecuentes. ¨ Anthony la fase de transición, el ambreen uterino se estira y las contracciones se producen con Janie Hernández.   ¨ Quizá tenga deseos de pujar, sin embargo es posible que el ambreen uterino aún no esté preparado. El médico le dirá cuándo pujar. · La segunda etapa comienza cuando el ambreen uterino se abre por completo y usted está lista para pujar. ¨ Las contracciones son muy intensas a fin de empujar al bebé por el canal de parto. ¨ Sentirá la necesidad de pujar. Podría sentir atul si tuviera ganas de evacuar el intestino. ¨ Quizás la entrenen a [place] 41 contracciones. Estas contracciones serán muy intensas abigail no ocurrirán con tanta frecuencia. Puede descansar un poco entre contracciones. ¨ Es posible que esté sensible e irritable. Es posible que no se dé cuenta de lo que pasa a mims alrededor. ¨ Un último esfuerzo y habrá nacido mims bebé. · La tercera etapa ocurre cuando con unas cuantas contracciones más se expulsa la placenta. [place] puede durar 30 minutos o menos. · La cuarta etapa es la de recuperación. Es posible que se sienta abrumada con las emociones y exhausta abigail alerta. Liza es un buen momento para comenzar el amamantamiento. ¿Dónde puede encontrar más información en inglés? Fely Cortes a [email or web address]-[email or web address].info/. Josey Carlton I063 en la búsqueda para aprender más acerca de \"Semanas 34 a 39 de mims embarazo: Instrucciones de cuidado - [ Meg Holly 34 to 39 of Your Pregnancy: Care Instructions ]. \"  Revisado: 21 noviembre, 2017  Versión del contenido: 11.7  © 1314-6189 [employer]. Las instrucciones de cuidado fueron adaptadas bajo licencia por [employer] (which disclaims liability or warranty for this information). Si usted tiene [place] afección médica o sobre estas instrucciones, siempre pregunte a mims profesional de jong. [facility], [employer]orporated niega toda garantía o responsabilidad por mims uso de esta información.        Edwin Wiseman a mims médico anthony el embarazo (después de 20 semanas) - [ Learning About When to Call Your Doctor During Pregnancy (After 20 Weeks) ]  Instrucciones de cuidado  Es normal que tenga inquietudes acerca de lo que podría ser un problema anthony el [facility]. Aunque la mayoría de las mujeres embarazadas no tienen ningún problema grave, es importante saber cuándo llamar a olson médico si tiene determinados síntomas o señales de trabajo de [place]. Estas son algunas sugerencias generales. Olson médico puede darle más información sobre cuándo llamar. Cuándo llamar a olson médico (después de 20 semanas)  Llame al 911 en cualquier momento que sospeche que puede necesitar atención de [place]. Por ejemplo, llame si:  · Tiene sangrado vaginal intenso. · Tiene dolor repentino e intenso en el abdomen. · Se desmayó (perdió el conocimiento). · Tiene júnior convulsión. · Ve o siente el cordón umbilical.  · Grace que está a punto de dahlia a kemar a olson bebé y no puede llegar en forma jaramillo al hospital.  Salazar Second a olson médico ahora mismo o busque atención médica inmediata si:  · Tiene sangrado vaginal.  · Tiene dolor en el abdomen. · Tiene fiebre. · Tiene síntomas de preeclampsia, tales atul:  ¨ Hinchazón repentina de la sol, las ines o los pies. ¨ Problemas nuevos con la visión (atul oscurecimiento de la visión o visión borrosa). ¨ Dolor de ari intenso. · Tiene júnior pérdida repentina de líquido por la vagina. (Piensa que rompió la marcell). · Grace que puede estar en [place]. [place] significa que usted ha tenido al menos 4 contracciones en 20 minutos o al menos 8 contracciones en [employer]. · Nota que olson bebé ha dejado de moverse o lo hace mucho menos de lo habitual.  · Tiene síntomas de júnior infección del tracto urinario. Estos pueden incluir:  ¨ Dolor o ardor al orinar. ¨ Necesidad de orinar con frecuencia sin poder eliminar mucha orina. ¨ Dolor en el flanco, que se encuentra sofiya debajo de la caja torácica y [place] de la cintura en un lado de la espalda. ¨ Mandeep en la orina.   Preste especial atención a los cambios en olson jong y asegúrese de comunicarse con loson médico si:  · Radha Hendricks flujo vaginal con un olor desagradable. · Tiene cambios en la piel, tales atul:  ¨ Salpullido. ¨ Comezón. ¨ Color amarillento en la piel. · Tiene otras inquietudes acerca de mims embarazo. Si tiene signos de trabajo de parto al llegar a las 37 [facility] o más  Si tiene señales de [place] de parto a las 37 semanas o [place], es posible que mims médico le diga que llame cuando mims trabajo de parto se vuelva más [place]. Los síntomas del trabajo de parto activo incluyen:  · Contracciones que son regulares. · Contracciones a intervalos de menos de 5 minutos. · Contracciones anthony las cuales es difícil hablar. La atención de seguimiento es júnior parte clave de mims tratamiento y seguridad. Asegúrese de hacer y acudir a todas las citas, y llame a mims médico si está teniendo problemas. También es júnior buena idea saber los resultados de david exámenes y mantener júnior lista de los medicamentos que elke. ¿Dónde puede encontrar más información en inglés? Izabel Roberts a [email or web address]-[email or web address].info/. Escriba W071 en la búsqueda para aprender más acerca de \"Aprenda cuándo llamar a mims médico anthony el embarazo (después de 20 semanas) - [ Learning About When to Call Your Doctor During Pregnancy (After 20 Weeks) ]. \"  Revisado: 21 noviembre, 2017  Versión del contenido: 11.7  © 6947-5176 [employer]orporated. Las instrucciones de cuidado fueron adaptadas bajo licencia por [employer] (which disclaims liability or warranty for this information). Si usted tiene [place] afección médica o sobre estas instrucciones, siempre pregunte a mims profesional de jong. [employer]wise, Incorporated niega toda garantía o responsabilidad por mims uso de esta información.

## 2018-07-27 NOTE — PROGRESS NOTES
Chief Complaint   Patient presents with    Routine Prenatal Visit     36w5d     1. Have you been to the ER, urgent care clinic since your last visit? Hospitalized since your last visit? No    2. Have you seen or consulted any other health care providers outside of the [address] [place] [address] since your last visit? Include any pap smears or colon screening.  No

## 2018-07-27 NOTE — PROGRESS NOTES
Return OB Visit       Subjective:   Elizabeth Rome 29 y.o. Margarita Love   MELL: 2018, Alternate MELL Entry  GA:  36w5d. #[id number]  Patient is doing well today. She has no concerns and is happy that the baby is coming soon. LOF: None  Vaginal bleeding: None  Fetal movement: Yes. Very active   Contractions: Yesterday. Not regular and went away. None today. Allergies- reviewed:   No Known Allergies  Medications- reviewed:   Current Outpatient Prescriptions   Medication Sig    prenatal vit-iron fumarate-fa (PRENATAL PLUS WITH IRON) 28 mg iron- 800 mcg tab Take 1 Tab by mouth daily. No current facility-administered medications for this visit. Past Medical History- reviewed:  Past Medical History:   Diagnosis Date     delivery delivered      Past Surgical History- reviewed:   History reviewed. No pertinent surgical history. Social History- reviewed:  Social History     Social History    Marital status:      Spouse name: N/A    Number of children: N/A    Years of education: N/A     Occupational History    Not on file.      Social History Main Topics    Smoking status: Never Smoker    Smokeless tobacco: Never Used    Alcohol use No    Drug use: No    Sexual activity: Yes     Partners: Male     Other Topics Concern    Not on file     Social History Narrative     Immunizations- reviewed:   Immunization History   Administered Date(s) Administered    Influenza Vaccine (Quad) PF 2015    Tdap 2018       Objective:     Visit Vitals    /71 (BP 1 Location: Right arm, BP Patient Position: Sitting)    Pulse 75    Temp 98.1 °F (36.7 °C) (Oral)    Resp 18    Ht 5' 4\" (1.626 m)    Wt 141 lb (64 kg)    LMP  (LMP Unknown)    SpO2 98%    BMI 24.2 kg/m2       Physical Exam:  GENERAL APPEARANCE: alert, well appearing, in no apparent distress  ABDOMEN: gravid, fundal height 36 cm, FHT present at 130 bpm  PSYCH: normal mood and affect    Labs  Recent Results (from the past 12 hour(s))   AMB POC URINALYSIS DIP STICK AUTO W/O MICRO    Collection Time: 18  1:34 PM   Result Value Ref Range    Color (UA POC) Yellow     Clarity (UA POC) Clear     Glucose (UA POC) Negative Negative    Bilirubin (UA POC) Negative Negative    Ketones (UA POC) Negative Negative    Specific gravity (UA POC) 1.020 1.001 - 1.035    Blood (UA POC) Negative Negative    pH (UA POC) 7.0 4.6 - 8.0    Protein (UA POC) Negative Negative    Urobilinogen (UA POC) 1 mg/dL 0.2 - 1    Nitrites (UA POC) Negative Negative    Leukocyte esterase (UA POC) 1+ Negative         Assessment         ICD-10-CM ICD-9-CM    1. 36 weeks gestation of pregnancy Z3A.36 V22.2 AMB POC URINALYSIS DIP STICK AUTO W/O MICRO         Plan   29 y.o.  36w5d MELL 2018 here for return OB visit     IUP. Third trimester pregnancy.   -Complicated by late to prenatal care and 20 week anatomy scan showing cardiac abnormalities.   -Third tri labs all WNL  -Today's UA: +1 LE, no nitrites  -GBS negative     Pediatric echo showing TAPVR  -Spoke with patient about her last visit at [address] [place] [address] for cardiac echocardiogram. Patient understands that she will continue her prenatal care at [address] [place] [address] and deliver at [address] [place] [address] also.     -18 at [facility] for prenatal 10:15am ultrasound 11:00am. Fax [phone]. Patient records were faxed over today and a copy was provided to patient to bring to [address] [place] [address]. Orders Placed This Encounter    AMB POC URINALYSIS DIP STICK AUTO W/O MICRO     Labor precautions discussed, including: Regular painful contractions, lasting for greater than one hour, taking your breath away; any vaginal bleeding; any leakage of fluid; or absent or decreased fetal movement. Call M.D. on call if any of these symptoms or signs occur. I have discussed the diagnosis with the patient and the intended plan as seen in the above orders. The patient has received an after-visit summary and questions were answered concerning future plans.   I have discussed medication side effects and warnings with the patient as well. Informed pt to return to the office or go to the ER if she experiences vaginal bleeding, vaginal discharge, leaking of fluid, pelvic cramping.     Pt seen and discussed with Dr. Dino Ortega (attending physician)    Matheus Mena MD  Family Medicine Resident

## 2018-09-24 ENCOUNTER — ROUTINE PRENATAL (OUTPATIENT)
Dept: FAMILY MEDICINE CLINIC | Age: 33
End: 2018-09-24

## 2018-09-24 VITALS
TEMPERATURE: 98.3 F | SYSTOLIC BLOOD PRESSURE: 113 MMHG | WEIGHT: 123 LBS | HEART RATE: 67 BPM | DIASTOLIC BLOOD PRESSURE: 74 MMHG | BODY MASS INDEX: 21 KG/M2 | HEIGHT: 64 IN | OXYGEN SATURATION: 97 % | RESPIRATION RATE: 16 BRPM

## 2018-09-24 DIAGNOSIS — N92.6 IRREGULAR MENSES: ICD-10-CM

## 2018-09-24 PROBLEM — Z34.90 PREGNANCY: Status: RESOLVED | Noted: 2018-05-02 | Resolved: 2018-09-24

## 2018-09-24 PROBLEM — O35.BXX0 ABNORMAL FETAL ECHOCARDIOGRAM AFFECTING ANTEPARTUM CARE OF MOTHER: Status: RESOLVED | Noted: 2018-07-14 | Resolved: 2018-09-24

## 2018-09-24 PROBLEM — O09.33 LATE PRENATAL CARE AFFECTING PREGNANCY IN THIRD TRIMESTER: Status: RESOLVED | Noted: 2018-06-05 | Resolved: 2018-09-24

## 2018-09-24 LAB
HCG URINE, QL. (POC): NEGATIVE
VALID INTERNAL CONTROL?: YES

## 2018-09-24 RX ORDER — ACETAMINOPHEN AND CODEINE PHOSPHATE 120; 12 MG/5ML; MG/5ML
1 SOLUTION ORAL DAILY
Qty: 1 PACKAGE | Refills: 11 | Status: SHIPPED | OUTPATIENT
Start: 2018-09-24 | End: 2022-02-09 | Stop reason: ALTCHOICE

## 2018-09-24 RX ORDER — IBUPROFEN 200 MG
TABLET ORAL
COMMUNITY
End: 2022-02-09 | Stop reason: ALTCHOICE

## 2018-09-24 NOTE — PATIENT INSTRUCTIONS
Nutrición para madres que amamantan: Instrucciones de cuidado - [ Nutrition for Breastfeeding Mothers: Care Instructions ]  Instrucciones de cuidado    Cuando júnior Craige Sensor a mims bebé, necesita júnior mayor cantidad de nutrientes para mantenerse saludable y poder producir leche para el bebé. Amamantar le ayuda a construir un vínculo entre usted y mims bebé. Margarita Sheerer a mims bebé excelentes beneficios de jong. Júnior alimentación hyacinth consiste en comer diversos alimentos de los grupos básicos: granos, verduras, frutas, [place] y productos lácteos (atul queso y yogur), y carne y frijoles ([address]) secos. Alimentarse heide anthony el amamantamiento asegura que usted se [place] hyacinth y que mims bebé crezca y se desarrolle normalmente. La atención de seguimiento es júnior parte clave de mims tratamiento y seguridad. Asegúrese de hacer y acudir a todas las citas, y llame a mims médico si está teniendo problemas. También es júnior buena idea saber los resultados de lexi exámenes y mantener júnior lista de los medicamentos que elke. ¿Cómo puede cuidarse en el hogar? · Incluya 3 a 4 tazas de [employer] o semidescremada o productos lácteos bajos en grasa o sin grasa en mims dieta todos los días. Estos incluyen:  ¨ Leche (8 onzas equivalen a 1 taza). ¨ Helado (1½ tazas equivalen a 1 taza de [place]). ¨ Queso (1½ onzas de queso [place] a 1 taza). ¨ Yogur (8 onzas equivalen a 1 taza). · Coma por lo menos 7 onzas de granos, atul cereales, panes, galletas saladas, arroz o pasta, todos los GRASSE. Júnior onza (28 gramos) equivale aproximadamente a 1 rebanada de pan, 1 taza de cereales para desayunar, o ½ taza de arroz, cereal o pasta cocidos. · Consuma 3 tazas de verduras cada día. Lexi opciones incluyen:  ¨ Verduras de color mike oscuro, atul brócoli y espinacas. ¨ Verduras de color naranja, atul zanahorias y camotes (batata, nghia). ¨ Frijoles secos (atul frijoles pintos y [place]) y arvejas ([facility]).   · Cada día coma 2 tazas de fruta fresca, congelada o enlatada. · Coma 6½ onzas de proteína todos los días, atul erick, pescado, carne [place], KAYLAHEFEMELIARD, [place] de cacahuate [place]), frijoles secos y chícharos (arvejas), nueces y semillas. Un huevo, 1 cucharada de mantequilla de cacahuate (maní), o ½ onza de nueces o semillas equivale a 1 onza (28 gramos) de proteína. Media (½) taza de frijoles cocidos equivale a 2 onzas (64 gramos) de proteína. · Steffanie abundantes líquidos, los suficientes atul para que mims orina sea de color amarillo andrés o transparente taul el agua. Si tiene [facility] [employer], el corazón o el hígado y tiene que Karen's líquidos, hable con mims médico antes de aumentar mims consumo. · Limite los productos con cafeína, tales atul café, té, chocolate y algunas bebidas gaseosas. Mims bebé puede recibir la cafeína a través de la [address]. [place] podría provocar agitación y problemas para dormir en los bebés. · Mims médico podría recomendarle un suplemento de vitaminas. Tómelo según lo recomendado. · Considere la idea de unirse a un deandre de apoyo de [place] que [place]. Estos grupos los ofrecen enfermeras, enfermeras parteras o asesoras de lactancia en muchos hospitales o [facility]. ¿Cuándo debe pedir ayuda? Llame a mims médico ahora mismo o busque atención médica inmediata si:    · Tiene dolor abdominal intenso.    Preste especial atención a los cambios en mims jong y asegúrese de comunicarse con mims médico si:    · Piensa que podría estar embarazada.     · Tiene problemas con mims método anticonceptivo.     · Piensa que puede estar deprimida.     · Tiene manchado con regularidad.     · Grace que puede ba estado expuesta a júnior infección de transmisión sexual o ya tiene [place]aci Pain. ¿Dónde puede encontrar más información en inglés? Monica Saldana a [email or web address]-rashida.[email or web address]   Escriba P234 en la búsqueda para aprender más acerca de \"Nutrición para madres que amamantan: Instrucciones de cuidado - [ Nutrition for Breastfeeding Mothers: Care Instructions ]. \"  Revisado: 21 noviembre, 2017  Versión del contenido: 11.7  © 7000-8252 [employer]wise, Incorporated. Las instrucciones de cuidado fueron adaptadas bajo licencia por [employer] (which disclaims liability or warranty for this information). Si usted tiene [place] afección médica o sobre estas instrucciones, siempre pregunte a mims profesional de jong. [employer] [place] niega toda garantía o responsabilidad por mims uso de esta información.

## 2018-09-24 NOTE — PROGRESS NOTES
HPI     CC: postpartum follow up     Aric Strauss is a 29 y.o. female [id number] who presents for postpartum follow up 6 weeks following a  at 39w0d. Pt delivered at [facility], due to concern for abnormal fetal echocardiogram. I have fully reviewed the prenatal and intrapartum course. "[employer] : 660477     Postpartum course has been uncomplicated. Bleeding - staining only. Bowel function is normal.     Bladder function is normal.     Patient is not sexually active. Pt's desired method of family planning: patch, but she is breastfeeding. No history of HTN, DVT, CAD, DM, liver disease, migraines. Postpartum depression screening: score = 5 - questionnaire will be scanned into [facility]). Baby's course has been doing well without problems. Baby is breastfeeding Q2-3 hours. Per mother, infant was seen by 4 different specialists at [facility] and had an echo, but was told that the heart was normal and did not require further follow up. Per mother, infant also had elevated bilirubin levels, but did not require phototherapy. PMHx - Reviewed  Past Medical History:   Diagnosis Date     delivery delivered     , delivered 2018       Meds - Reviewed  Current Outpatient Prescriptions   Medication Sig Dispense Refill    ibuprofen (MOTRIN) 200 mg tablet Take  by mouth.  prenatal vit-iron fumarate-fa (PRENATAL PLUS WITH IRON) 28 mg iron- 800 mcg tab Take 1 Tab by mouth daily. Allergies - Reviewed  No Known Allergies    Smoker - Reviewed  History   Smoking Status    Never Smoker   Smokeless Tobacco    Never Used       ETOH - Reviewed  History   Alcohol Use No       FH - Reviewed  No family history on file. ROS:  Review of Systems   Constitutional: Negative for activity change, appetite change, chills, diaphoresis, fatigue and fever. Eyes: Negative for visual disturbance. Respiratory: Negative for cough, chest tightness and shortness of breath. Cardiovascular: Negative for chest pain. Gastrointestinal: Negative for abdominal pain, blood in stool, constipation, diarrhea, nausea and vomiting. Genitourinary: Positive for menstrual problem. Negative for dysuria, hematuria and pelvic pain. Musculoskeletal: Negative for arthralgias and myalgias. Neurological: Negative for dizziness, light-headedness and headaches. Physical Exam:  Visit Vitals    /74 (BP 1 Location: Right arm, BP Patient Position: Sitting)    Pulse 67    Temp 98.3 °F (36.8 °C) (Oral)    Resp 16    Ht 5' 4\" (1.626 m)    Wt 123 lb (55.8 kg)    LMP  (LMP Unknown)    SpO2 97%    Breastfeeding Yes    BMI 21.11 kg/m2       Wt Readings from Last 3 Encounters:   09/24/18 123 lb (55.8 kg)   07/27/18 141 lb (64 kg)   07/17/18 138 lb (62.6 kg)     BP Readings from Last 3 Encounters:   09/24/18 113/74   07/27/18 106/71   07/17/18 121/84        Physical Exam   Constitutional: She is oriented to person, place, and time. She appears well-developed and well-nourished. No distress. HENT:   Head: Normocephalic and atraumatic. Mouth/Throat: Oropharynx is clear and moist.   Eyes: No scleral icterus. Cardiovascular: Normal rate and regular rhythm. Pulmonary/Chest: Effort normal and breath sounds normal. No respiratory distress. She has no wheezes. Abdominal: Soft. She exhibits no distension. There is no tenderness. There is no guarding. Neurological: She is alert and oriented to person, place, and time. She exhibits normal muscle tone. Coordination normal.   Skin: Skin is warm and dry. She is not diaphoretic. Psychiatric: She has a normal mood and affect. Her behavior is normal.   EPDS score: 5   Nursing note and vitals reviewed.        Recent Results (from the past 12 hour(s))   AMB POC URINE PREGNANCY TEST, VISUAL COLOR COMPARISON    Collection Time: 09/24/18 10:20 AM   Result Value Ref Range    VALID INTERNAL CONTROL POC Yes     HCG urine, Ql. (POC) Negative Negative Assessment     29 y.o. female [id number] presents for postpartum follow up 6 weeks after    Patient Active Problem List   Diagnosis Code    H/O high risk medication treatment Z92.29    History of  Z98.891    , delivered O31.200       Today's diagnoses are:    ICD-10-CM ICD-9-CM    1. Encounter for routine postpartum follow-up Z39.2 V24.2    2. Irregular menses N92.6 626.4 AMB POC URINE PREGNANCY TEST, VISUAL COLOR COMPARISON              Plan     1. [id number] here for postpartum follow up from [address] [place] [address] Ave  - POC UPT negative  - progesterone-only OCP as patient is breastfeeding      Follow up as needed     Prior labs and imaging were reviewed. I have discussed the diagnosis with the patient and the intended plan as seen in the above orders. The patient has received an after-visit summary and questions were answered concerning future plans. I have discussed medication side effects and warnings with the patient as well. Patient discussed with Dr. Dieter Nolan, Attending Physician.     Jesús Henriquez MD, PGY3  Family Medicine Resident

## 2018-09-24 NOTE — MR AVS SNAPSHOT
[address] 
[phone] Patient: Rianna Robb MRN: [id number] NXL:[id number] Visit Information Ena Julio Personal Médico Departamento Teléfono del Dep. Número de visita 9/24/2018 10:10 AM Willis Nguyen MD [address] [place] [address] [phone] Upcoming Health Maintenance Date Due Influenza Age 5 to Adult 8/1/2018 PAP AKA CERVICAL CYTOLOGY 5/8/2021 DTaP/Tdap/Td series (2 - Td) 6/4/2028 Alergias  Review Complete El: 9/24/2018 Por: Rupinder Dumont LPN A partir del:  9/24/2018 No Known Allergies Vacunas actuales Cam Distance Cloyde Bail Influenza Vaccine (Quad) PF 1/29/2015 Tdap 6/4/2018 No revisadas esta visita You Were Diagnosed With   
  
 Ronna Shelling Irregular menses    -  Primary ICD-10-CM: N92.6 ICD-9-CM: 626.4 Partes vitales PS Pulso Temperatura Resp [place] ( percentil de crecimiento) Peso (percentil de crecimiento) 113/74 (BP 1 Location: Right arm, BP Patient Position: Sitting) 67 98.3 °F (36.8 °C) (Oral) 16 5' 4\" (1.626 m) 123 lb (55.8 kg) LMP (última jigar) SpO2 Está amamantando? BMI [facility]) Estado obstétrico Estatus de tabaquísmo (LMP Unknown) 97% Yes 21.11 kg/m2 Recent pregnancy Never Smoker Historial de signos vitales BMI and BSA Data Body Mass Index Body Surface Area  
 21.11 kg/m 2 1.59 m 2 Danuta Perales Pharmacy Name Phone [id number] ARMAAN BANUELOS  Raghavendra Aldair [address] [phone] Olson lista de medicamentos actualizada Lista actualizada 9/24/18 10:20 AM.  Freddie Mendoza use olson lista de medicamentos más reciente. ibuprofen 200 mg tablet También conocido atul:  MOTRIN Take  by mouth. norethindrone 0.35 mg Tab También conocido atul:  Madhu [employer] Madhu Take 1 Tab by mouth daily. prenatal vit-iron fumarate-fa 28 mg iron- 800 mcg Tab También conocido atul:  PRENATAL PLUS with IRON Take 1 Tab by mouth daily. Recetas Enviado a la Rafa Refills  
 norethindrone (MICRONOR) 0.35 mg tab 11 Sig: Take 1 Tab by mouth daily. Class: Normal  
 [facility] [address] [facility] #: [phone] Route: Oral  
  
Hicimos lo siguiente AMB POC URINE PREGNANCY TEST, VISUAL COLOR COMPARISON [37384 CPT(R)] Instrucciones para el Paciente Nutrición para madres que amamantan: Instrucciones de cuidado - [ Nutrition for Breastfeeding Mothers: Care Instructions ] Instrucciones de cuidado Cuando júnior yuki [place] a mims bebé, necesita júnior mayor cantidad de nutrientes para mantenerse saludable y poder producir leche para el bebé. Amamantar le ayuda a construir un vínculo entre usted y mims bebé. Alma Haines a mims bebé excelentes beneficios de jong. Júnior alimentación hyacinth consiste en comer diversos alimentos de los grupos básicos: granos, verduras, frutas, [place] y productos lácteos (atul queso y yogur), y carne y frijoles ([address]) secos. Alimentarse heide anthony el amamantamiento asegura que usted se [place] hyacinth y que mims bebé crezca y se desarrolle normalmente. La atención de seguimiento es júnior parte clave de mims tratamiento y seguridad. Asegúrese de hacer y acudir a todas las citas, y llame a mims médico si está teniendo problemas. También es júnior buena idea saber los resultados de david exámenes y mantener júnior lista de los medicamentos que elke. Cómo puede cuidarse en el hogar? · Incluya 3 a 4 tazas de [employer] o semidescremada o productos lácteos bajos en grasa o sin grasa en mims dieta todos los días. Estos incluyen: ¨ Leche (8 onzas equivalen a 1 taza). ¨ Helado (1½ tazas equivalen a 1 taza de [place]). ¨ Queso (1½ onzas de queso [place] a 1 taza). ¨ Yogur (8 onzas equivalen a 1 taza). · Coma por lo menos 7 onzas de granos, atul cereales, panes, galletas saladas, arroz o pasta, todos los GRASSE. Aida onza (28 gramos) equivale aproximadamente a 1 rebanada de pan, 1 taza de cereales para desayunar, o ½ taza de arroz, cereal o pasta cocidos. · Consuma 3 tazas de verduras cada día. Lexi opciones incluyen: ¨ Verduras de color mike oscuro, atul brócoli y espinacas. ¨ Verduras de color naranja, atul zanahorias y camotes (batata, boniato). ¨ Frijoles secos (atul frijoles pintos y [place]) y arvejas ([facility]). · Cada día coma 2 tazas de fruta fresca, congelada o enlatada. · Coma 6½ onzas de proteína todos los días, atul erick, pescado, carne [place], [place], [address] de cacahuate [place]), frijoles secos y chícharos (arvejas), nueces y semillas. Un huevo, 1 cucharada de mantequilla de cacahuate (maní), o ½ onza de nueces o semillas equivale a 1 onza (28 gramos) de proteína. Media (½) taza de frijoles cocidos equivale a 2 onzas (64 gramos) de proteína. · Steffanie abundantes líquidos, los suficientes atul para que mims orina sea de color amarillo andrés o transparente atul el agua. Si tiene [place] & [facility] Financial, el corazón o el hígado y tiene que Karen's líquidos, hable con mims médico antes de aumentar mims consumo. · Limite los productos con cafeína, tales atlu café, té, chocolate y algunas bebidas gaseosas. Mims bebé puede recibir la cafeína a través de la [place]. [place] podría provocar agitación y problemas para dormir en los bebés. · Mims médico podría recomendarle un suplemento de vitaminas. Tómelo según lo recomendado. · Considere la idea de unirse a un deandre de apoyo de [place] que [place]. Estos grupos los ofrecen enfermeras, enfermeras parteras o asesoras de lactancia en muchos hospitales o centros de [facility]. Cuándo debe pedir ayuda?  
Llame a mims médico ahora mismo o busque atención médica inmediata si: 
   · Tiene dolor abdominal intenso.  
 Preste especial atención a los cambios en mims jong y asegúrese de comunicarse con mims médico si: 
  · Piensa que podría estar embarazada.  
  · Tiene problemas con mims método anticonceptivo.  
  · Piensa que puede estar deprimida.  
  · Tiene manchado con regularidad.  
  · Grace que puede ba estado expuesta a júnior infección de transmisión sexual o ya tiene [place]. Dónde puede encontrar más información en inglés? Penne Seferino a http://jacy-rashida.info/. Escriba P234 en la búsqueda para aprender más acerca de \"Nutrición para madres que amamantan: Instrucciones de cuidado - [ Nutrition for Breastfeeding Mothers: Care Instructions ]. \" 
Revisado: 21 noviembre, 2017 Versión del contenido: 11.7 © 6810-9559 Healthwise, Incorporated. Las instrucciones de cuidado fueron adaptadas bajo licencia por [employer] (which disclaims liability or warranty for this information). Si usted tiene [place] afección médica o sobre estas instrucciones, siempre pregunte a mims profesional de jong. Healthwise, Incorporated niega toda garantía o responsabilidad por mims uso de esta información. Introducing [facility]! [facility] introduce portal paciente Armando . Ahora se puede acceder a partes de mims expediente médico, enviar por correo electrónico la oficina de mims médico y solicitar renovaciones de medicamentos en línea. En mims navegador de Internet , Elvis Flavors a [email or web address]. com/[email or web address] Nell clic en el usuario por [place]an? David Matteo feliciano aquí en la sesión Marlene [facility]. Verá la página de registro [place]. Ingrese mims código de [facility] Janine aleks y atul aparece a continuación. Usted no tendrá que [employer] código después de ba completado el proceso de registro . Si usted no se inscribe antes de la fecha de caducidad , debe solicitar un nuevo código. · MyChart Código de acceso : [id number] Expires: 12/23/2018 10:20 AM 
 
 Ingresa los últimos cuatro dígitos de mims Número de Seguro Social ( xxxx ) y fecha de nacimiento ( dd / mm / aaaa ) atul se indica y nell clic en Enviar. Usted será llevado a la siguiente página de registro . Crear un ID MyChart . Esta será mims ID de inicio de sesión de MyChart y no puede ser [place] , por lo que pensar en júnior que es Julane Osier y fácil de recordar . Crear júnior contraseña MyChart . Usted puede cambiar mims contraseña en cualquier momento . Ingrese mims Password Reset de preguntas y [place] . [place] se puede utilizar en un momento posterior si usted olvida mims contraseña. Introduzca mims dirección de correo electrónico . Roselyn Peed recibirá júnior notificación por correo electrónico cuando la nueva información está disponible en MyChart . Kerri Hunger clic en Registrarse. Kaycee Marx rustam y descargar porciones de mims expediente médico. 
Nell clic en el enlace de descarga del menú Resumen para descargar júnior copia portátil de mims información médica . Si tiene [employer] , por favor visite la sección de preguntas frecuentes del sitio web MyChart . Recuerde, MyChart NO es que se utilizará para las necesidades urgentes. Para emergencias médicas , llame al 911 . Ahora disponible en mims iPhone y Android ! Por favor proporcione herve resumen de la documentación de cuidado a mims próximo proveedor. Your primary care clinician is listed as Louis Mccall. If you have any questions after today's visit, please call [phone].

## 2018-09-24 NOTE — PROGRESS NOTES
Identified Patient with two Patient identifiers (Name and ). Two Patient Identifiers confirmed. Reviewed record in preparation for visit and have obtained necessary documentation. Chief Complaint   Patient presents with   [address]     delivered baby vaginally at [facility] 22 with no complications       Visit Vitals    /74 (BP 1 Location: Right arm, BP Patient Position: Sitting)    Pulse 67    Temp 98.3 °F (36.8 °C) (Oral)    Resp 16    Ht 5' 4\" (1.626 m)    Wt 123 lb (55.8 kg)    SpO2 97%    Breastfeeding Yes    BMI 21.11 kg/m2       1. Have you been to the ER, urgent care clinic since your last visit? Hospitalized since your last visit? YES, [facility] TOOK OVER CARE OF PATIENT    2. Have you seen or consulted any other health care providers outside of the [address] [place] [address] since your last visit? Include any pap smears or colon screening.  YES SEE ABOVE

## 2022-02-09 ENCOUNTER — INITIAL PRENATAL (OUTPATIENT)
Dept: FAMILY MEDICINE CLINIC | Age: 37
End: 2022-02-09

## 2022-02-09 VITALS
HEIGHT: 64 IN | HEART RATE: 72 BPM | OXYGEN SATURATION: 99 % | BODY MASS INDEX: 23.42 KG/M2 | SYSTOLIC BLOOD PRESSURE: 103 MMHG | TEMPERATURE: 98 F | DIASTOLIC BLOOD PRESSURE: 64 MMHG | WEIGHT: 137.2 LBS

## 2022-02-09 DIAGNOSIS — Z34.00 INITIAL OBSTETRIC VISIT, ANTEPARTUM: Primary | ICD-10-CM

## 2022-02-09 DIAGNOSIS — Z34.00 INITIAL OBSTETRIC VISIT, ANTEPARTUM: ICD-10-CM

## 2022-02-09 DIAGNOSIS — Z82.79 FAMILY HISTORY OF CONGENITAL HEART DEFECT: ICD-10-CM

## 2022-02-09 LAB
BILIRUB UR QL STRIP: NEGATIVE
BLOOD BANK CMNT PATIENT-IMP: NORMAL
BLOOD GROUP ANTIBODIES SERPL: NORMAL
GLUCOSE UR-MCNC: NEGATIVE MG/DL
HCG URINE, QL. (POC): POSITIVE
KETONES P FAST UR STRIP-MCNC: NEGATIVE MG/DL
PH UR STRIP: 6 [PH] (ref 4.6–8)
PROT UR QL STRIP: NEGATIVE
SP GR UR STRIP: 1.03 (ref 1–1.03)
UA UROBILINOGEN AMB POC: NORMAL (ref 0.2–1)
URINALYSIS CLARITY POC: NORMAL
URINALYSIS COLOR POC: NORMAL
URINE BLOOD POC: NEGATIVE
URINE LEUKOCYTES POC: NORMAL
URINE NITRITES POC: NEGATIVE
VALID INTERNAL CONTROL?: YES

## 2022-02-09 PROCEDURE — 0501F PRENATAL FLOW SHEET: CPT | Performed by: STUDENT IN AN ORGANIZED HEALTH CARE EDUCATION/TRAINING PROGRAM

## 2022-02-09 PROCEDURE — 81003 URINALYSIS AUTO W/O SCOPE: CPT | Performed by: STUDENT IN AN ORGANIZED HEALTH CARE EDUCATION/TRAINING PROGRAM

## 2022-02-09 PROCEDURE — 81025 URINE PREGNANCY TEST: CPT | Performed by: STUDENT IN AN ORGANIZED HEALTH CARE EDUCATION/TRAINING PROGRAM

## 2022-02-09 NOTE — PROGRESS NOTES
I reviewed with the resident the medical history and the resident's findings on the physical examination. I discussed with the resident the patient's diagnosis and concur with the plan. 38yo  @ 16w2d by LMP   1. IUP: IOB labs, will defer dating to MFM given >14 wk when establishing care   2. Hx CS and  x2  3. Family hx Tri21  4.   Family hx autism

## 2022-02-09 NOTE — PROGRESS NOTES
Identified Patient with two Patient identifiers (Name and ). Two Patient Identifiers confirmed. Reviewed record in preparation for visit and have obtained necessary documentation. Patient is Unknown      LEAKAGE OF FLUID: N  BLEEDING: N  FETAL MOVEMENT: Y  BRANT NAQVI CONTRACTIONS:N  PRENATAL VITAMINS: Y  PAIN: N    Chief Complaint   Patient presents with    Initial Prenatal Visit     LMP: 10/18/21 based on LMP: she is 16w2d. MELL: 22       Visit Vitals  /64 (BP 1 Location: Left upper arm, BP Patient Position: Sitting)   Pulse 72   Temp 98 °F (36.7 °C) (Oral)   Ht 5' 4\" (1.626 m)   Wt 137 lb 3.2 oz (62.2 kg)   SpO2 99%   BMI 23.55 kg/m²       1. Have you been to the ER, urgent care clinic since your last visit? Hospitalized since your last visit? N    2. Have you seen or consulted any other health care providers outside of the [address] [place] [address] since your last visit? Include any pap smears or colon screening.  Tate Fung

## 2022-02-09 NOTE — PROGRESS NOTES
1Subjective:   Kirk Valle is a 40 y.o.  who is being seen today for her first obstetrical visit. Patient reports feeling well. No concerns at this time. Pregnancy complicated by h/o TAPVR in previous pregnancy w/ care transferred to [facility]. H/o Cs w/  x2, autism in second son, FHx of down syndrome. OB History:  See Chart    This is not a planned pregnancy. FOB in the picture and is supportive. Patient's last menstrual period was 10/18/2021 (exact date). Gestational age based on LMP: 14w3d  Estimated Date of Delivery: 22       History of GDM or DM? no  History of GHTN or HTN? no  History of pre-eclampsia? no  History of PPH? no  Taking prenatal vitamins? yes  History of Sexual trauma? no  History of STI's? no  History of Depression? no    Relevant past medical history:(relevant to this pregnancy):   Denies HTN, DM or asthma. Denies thyroid problems. Pap smear history:  Last pap smear:  NILM     Substance history:   She does not report current tobacco use. She does not report current alcohol use. She does not report current drug use. Exposure history: There are not indoor cat(s) in the home. The patient was instructed not to change cat litter boxes during pregnancy. Patient does not report issues with domestic violence. Allergies- reviewed:   No Known Allergies    Medications- reviewed:   Current Outpatient Medications   Medication Sig    prenatal vit-iron fumarate-fa (PRENATAL PLUS WITH IRON) 28 mg iron- 800 mcg tab Take 1 Tab by mouth daily.  ibuprofen (MOTRIN) 200 mg tablet Take  by mouth. (Patient not taking: Reported on 2022)    norethindrone (MICRONOR) 0.35 mg tab Take 1 Tab by mouth daily. (Patient not taking: Reported on 2022)     No current facility-administered medications for this visit.        Past Medical History- reviewed:  Past Medical History:   Diagnosis Date     delivery delivered     , delivered 2018       Past Surgical History- reviewed:   Past Surgical History:   Procedure Laterality Date    HX  SECTION         Social History- reviewed:  Social History     Socioeconomic History    Marital status:      Spouse name: Not on file    Number of children: Not on file    Years of education: Not on file    Highest education level: Not on file   Occupational History    Not on file   Tobacco Use    Smoking status: Never Smoker    Smokeless tobacco: Never Used   Substance and Sexual Activity    Alcohol use: No    Drug use: No    Sexual activity: Yes     Partners: Male   Other Topics Concern    Not on file   Social History Narrative    Not on file     Social Determinants of Health     Financial Resource Strain:     Difficulty of Paying Living Expenses: Not on file   Food Insecurity:     Worried About Running Out of Food in the Last Year: Not on file    Epi of Food in the Last Year: Not on file   Transportation Needs:     Lack of Transportation (Medical): Not on file    Lack of Transportation (Non-Medical):  Not on file   Physical Activity:     Days of Exercise per Week: Not on file    Minutes of Exercise per Session: Not on file   Stress:     Feeling of Stress : Not on file   Social Connections:     Frequency of Communication with Friends and Family: Not on file    Frequency of Social Gatherings with Friends and Family: Not on file    Attends [employer]al Services: Not on file    Active Member of [address] [place] [address] or Organizations: Not on file    Attends Club or Organization Meetings: Not on file    Marital Status: Not on file   Intimate Partner Violence:     Fear of Current or Ex-Partner: Not on file    Emotionally Abused: Not on file    Physically Abused: Not on file    Sexually Abused: Not on file   Housing Stability:     Unable to Pay for Housing in the Last Year: Not on file    Number of [place] in the Last Year: Not on file    Unstable Housing in the Last Year: Not on file       OB History- reviewed:  OB History    Para Term  AB Living   5 4 4     4   SAB IAB Ectopic Molar Multiple Live Births             4      # Outcome Date GA Lbr Yvon/2nd Weight Sex Delivery Anes PTL Lv   5 Current            4 Term 18 39w0d  7 lb 12 oz (3.515 kg) M Vag-Spont  N RAFAEL   3 Term  40w0d  7 lb 10 oz (3.459 kg) M    RAFAEL   2 Term  41w0d  7 lb 10 oz (3.459 kg) M CS-LTranv   RAFAEL      Complications: Other (comment)   1 Term  40w0d  7 lb 4 oz (3.289 kg) M Vag-Spont   RAFAEL      Obstetric Comments   LTCS at [facility] due to Fetal bradycardia. Baby was later diagnosed with Autism        Objective:     Visit Vitals  /64 (BP 1 Location: Left upper arm, BP Patient Position: Sitting)   Pulse 72   Temp 98 °F (36.7 °C) (Oral)   Ht 5' 4\" (1.626 m)   Wt 137 lb 3.2 oz (62.2 kg)   LMP 10/18/2021 (Exact Date)   SpO2 99%   BMI 23.55 kg/m²       See physical exam on flowsheet  [employer] exam chaperoned by Ela Bermeo MA  Labs:  Recent Results (from the past 12 hour(s))   AMB POC URINE PREGNANCY TEST, VISUAL COLOR COMPARISON    Collection Time: 22 10:03 AM   Result Value Ref Range    VALID INTERNAL CONTROL POC Yes     HCG urine, Ql. (POC) Positive Negative   AMB POC URINALYSIS DIP STICK AUTO W/O MICRO    Collection Time: 22 10:03 AM   Result Value Ref Range    Color (UA POC) Dark Yellow     Clarity (UA POC) Cloudy     Glucose (UA POC) Negative Negative    Bilirubin (UA POC) Negative Negative    Ketones (UA POC) Negative Negative    Specific gravity (UA POC) 1.030 1.001 - 1.035    Blood (UA POC) Negative Negative    pH (UA POC) 6.0 4.6 - 8.0    Protein (UA POC) Negative Negative    Urobilinogen (UA POC) 0.2 mg/dL 0.2 - 1    Nitrites (UA POC) Negative Negative    Leukocyte esterase (UA POC) 1+ Negative         Assessment and Plan:         ICD-10-CM ICD-9-CM    1.  Initial obstetric visit, antepartum  Z34.00 V22.0 HEP B SURFACE AG      ANTIBODY SCREEN      VZV AB, IGG RPR      HEMOGLOBIN FRACTIONATION      RUBELLA AB, IGG      CHLAMYDIA/GC PCR      CULTURE, URINE      HIV 1/2 AG/AB, 4TH GENERATION,W RFLX CONFIRM      CBC WITH AUTOMATED DIFF      AMB POC URINE PREGNANCY TEST, VISUAL COLOR COMPARISON      AMB POC URINALYSIS DIP STICK AUTO W/O MICRO      HCV AB W/RFLX TO BRET      INFLUENZA VIRUS VAC QUAD,SPLIT,PRESV FREE SYRINGE IM      CANCELED: US UTS TRANSVAGINAL OB       40 y.o.  at 16w2d by LMP here for initial OB visit     Prenatal care  · IOB labs collected (CBC without diff, antibody screen (blood type is O+), Rh antibody screen, rubella titer, HBsAg titer, RPR, HIV, UA w/ cx, gonorrhea/chlamydia, UDS if teen, 1hr GTT +A1c if obese). · Discussed recommended weight gain. Based on prepregnancy BMI 25-35lb. Starting Weight 137lb. · Last Pap: Pap in 2018 NILM with HR HPV neg  · Cont Prenatal vitamins  · Influenza Vaccine: not done today. Has had COVID shot end of July (just 1 dose) Pfeizer. Recommended 2nd dose  · Discussed optional genetic screening: gwen and horizon collected today  · Request for MFM anatomy scan faxed: yes  · Dating US scheduled  · Follow up in 4 weeks    H/o TAPVR: In previous pregnancy (2018) with care transferred to [address] [place] [address]. She was told baby would need open heart surgery soon after birth but ended up not needed it. - Collect gwen/horizon today  - MFM informed    H/o C/S with  x2: Patient desires TOLAC. Second child with autism: Early screening for this baby. Undesired pregnancy: Now she is happy but initially slightly depressed. FOB supportive. PHQ screening neg (score 1)    FHx of trisomy 21:   - CFFDNA collected today    ---------------------------------------  · Continuity Provider: None thus far  · Pain mgmt.  in labor: TBD  · Feeding: TBD  · Circ:  TBD  · Social: Denies EtOH, smoking, drugs  · Has seen lactation: No  ---------------------------------------    Orders Placed This Encounter    CHLAMYDIA/GC PCR     Standing Status: Future     Standing Expiration Date:   2/8/2023     Order Specific Question:   Sample source     Answer:   Swab [241]     Order Specific Question:   Specimen source     Answer:   Endocervix [350]    CULTURE, URINE     Standing Status:   Future     Number of Occurrences:   1     Standing Expiration Date:   2/9/2023    INFLUENZA VIRUS VAC QUAD,SPLIT,PRESV FREE SYRINGE IM (Flulaval, Fluzone, Fluarix) ([id number])    HEP B SURFACE AG     Standing Status:   Future     Standing Expiration Date:   2/8/2023    VZV AB, IGG     Standing Status:   Future     Standing Expiration Date:   8/8/2022    RPR     Standing Status:   Future     Standing Expiration Date:   8/8/2022    HEMOGLOBIN FRACTIONATION     Standing Status:   Future     Standing Expiration Date:   8/8/2022    RUBELLA AB, IGG     Standing Status:   Future     Standing Expiration Date:   2/9/2023    HIV 1/2 AG/AB, 4TH GENERATION,W RFLX CONFIRM     Standing Status:   Future     Standing Expiration Date:   2/8/2023    CBC WITH AUTOMATED DIFF     Standing Status:   Future     Standing Expiration Date:   2/9/2023    HCV AB W/RFLX TO BRET     Standing Status:   Future     Standing Expiration Date:   2/9/2023    AMB POC URINE PREGNANCY TEST, VISUAL COLOR COMPARISON    AMB POC URINALYSIS DIP STICK AUTO W/O MICRO    ANTIBODY SCREEN     Standing Status:   Future     Standing Expiration Date:   8/8/2022         I have discussed the diagnosis with the patient and the intended plan as seen in the above orders. The patient has received an after-visit summary and questions were answered concerning future plans. I have discussed medication side effects and warnings with the patient as well. Informed pt to return to the office or go to the ER if she experiences vaginal bleeding, vaginal discharge, leaking of fluid, pelvic cramping.       Pt seen and discussed with Aroldo (attending physician)    Manuel Collins MD  Family Medicine Resident

## 2022-02-10 LAB
BASOPHILS # BLD: 0 K/UL (ref 0–0.1)
BASOPHILS NFR BLD: 1 % (ref 0–1)
DIFFERENTIAL METHOD BLD: ABNORMAL
EOSINOPHIL # BLD: 0.1 K/UL (ref 0–0.4)
EOSINOPHIL NFR BLD: 1 % (ref 0–7)
ERYTHROCYTE [DISTWIDTH] IN BLOOD BY AUTOMATED COUNT: 13 % (ref 11.5–14.5)
HBV SURFACE AG SER QL: <0.1 INDEX
HBV SURFACE AG SER QL: NEGATIVE
HCT VFR BLD AUTO: 33.4 % (ref 35–47)
HGB BLD-MCNC: 10.6 G/DL (ref 11.5–16)
HIV 1+2 AB+HIV1 P24 AG SERPL QL IA: NONREACTIVE
HIV12 RESULT COMMENT, HHIVC: NORMAL
IMM GRANULOCYTES # BLD AUTO: 0.1 K/UL (ref 0–0.04)
IMM GRANULOCYTES NFR BLD AUTO: 1 % (ref 0–0.5)
LYMPHOCYTES # BLD: 1.6 K/UL (ref 0.8–3.5)
LYMPHOCYTES NFR BLD: 19 % (ref 12–49)
MCH RBC QN AUTO: 30 PG (ref 26–34)
MCHC RBC AUTO-ENTMCNC: 31.7 G/DL (ref 30–36.5)
MCV RBC AUTO: 94.6 FL (ref 80–99)
MONOCYTES # BLD: 0.5 K/UL (ref 0–1)
MONOCYTES NFR BLD: 6 % (ref 5–13)
NEUTS SEG # BLD: 6.1 K/UL (ref 1.8–8)
NEUTS SEG NFR BLD: 72 % (ref 32–75)
NRBC # BLD: 0 K/UL (ref 0–0.01)
NRBC BLD-RTO: 0 PER 100 WBC
PLATELET # BLD AUTO: 268 K/UL (ref 150–400)
PMV BLD AUTO: 10.6 FL (ref 8.9–12.9)
RBC # BLD AUTO: 3.53 M/UL (ref 3.8–5.2)
RPR SER QL: NONREACTIVE
RUBV IGG SER-IMP: REACTIVE
RUBV IGG SERPL IA-ACNC: 123.9 IU/ML
WBC # BLD AUTO: 8.5 K/UL (ref 3.6–11)

## 2022-02-11 LAB
BACTERIA SPEC CULT: NORMAL
SERVICE CMNT-IMP: NORMAL
VZV IGG SER IA-ACNC: 341 INDEX

## 2022-02-13 NOTE — PROGRESS NOTES
**Addendum: Will not send iron. She was in second trimester whe nlabs were collected.     Alli Ruiz MD

## 2022-02-13 NOTE — PROGRESS NOTES
IOB labs: RH pos, Ab screen neg, HIV/RPR/Hep B/C neg, GC/CT not ran?, VZV/Rubella immune, HGB 10.6, Hbg fract pending, UA/Ucx neg, pap NILM    Will send iron for anemia    Vicky Lawler MD

## 2022-02-14 LAB
HCV AB S/CO SERPL IA: <0.1 S/CO RATIO (ref 0–0.9)
HCV AB SERPL QL IA: NORMAL
HGB A MFR BLD: 97.2 % (ref 96.4–98.8)
HGB A2 MFR BLD COLUMN CHROM: 2.8 % (ref 1.8–3.2)
HGB F MFR BLD: 0 % (ref 0–2)
HGB FRACT BLD-IMP: NORMAL
HGB S MFR BLD: 0 %

## 2022-02-28 ENCOUNTER — DOCUMENTATION ONLY (OUTPATIENT)
Dept: PERINATAL CARE | Age: 37
End: 2022-02-28

## 2022-02-28 ENCOUNTER — HOSPITAL ENCOUNTER (OUTPATIENT)
Dept: PERINATAL CARE | Age: 37
Discharge: HOME | End: 2022-02-28
Attending: OBSTETRICS & GYNECOLOGY

## 2022-02-28 PROCEDURE — 76811 OB US DETAILED SNGL FETUS: CPT | Performed by: OBSTETRICS & GYNECOLOGY

## 2022-02-28 NOTE — PROGRESS NOTES
Beena Barr is a 40 y.o. female  HIPAA verified by two patient identifiers. Chief complaint:Ultrasound  B/p:123/37  Pulse: 77  O2: 98%  Temp: 98.2  Weight:139lbs  1. Have you been to the ER, urgent care clinic since your last visit? Hospitalized since your last visit? No    2. Have you seen or consulted any other health care providers outside of the [address] [place] [address] since your last visit? Include any pap smears or colon screening.  No

## 2022-03-09 ENCOUNTER — TELEPHONE (OUTPATIENT)
Dept: FAMILY MEDICINE CLINIC | Age: 37
End: 2022-03-09

## 2022-03-09 ENCOUNTER — ROUTINE PRENATAL (OUTPATIENT)
Dept: FAMILY MEDICINE CLINIC | Age: 37
End: 2022-03-09

## 2022-03-09 DIAGNOSIS — Z3A.20 20 WEEKS GESTATION OF PREGNANCY: Primary | ICD-10-CM

## 2022-03-09 PROCEDURE — 0502F SUBSEQUENT PRENATAL CARE: CPT | Performed by: STUDENT IN AN ORGANIZED HEALTH CARE EDUCATION/TRAINING PROGRAM

## 2022-03-09 NOTE — PROGRESS NOTES
Lola Seo  40 y.o. female  1985  [facility]  [id number]    [phone] (home)      [address]:    [facility] Telephone Encounter  Mandi Cottrell MD       Encounter Date: 3/9/2022 at 9:05 AM    Consent:  She and/or the health care decision maker is aware that this encounter will be billed as a routine OB appointment and that she may receive a bill for this telephone service, depending on her insurance coverage, and has provided verbal consent to proceed: Yes    Follow-up Prenatal     History of Present Illness   Contractions: no  LOF: no  Vaginal bleeding: no  Fetal movement (if >20 wk): yes    Mood is good and pt reports feeling happy. Vitals/Objective:   General: Patient speaking in complete sentences without effort. Normal speech and cooperative. Due to this being a Virtual Check-in/Telephone evaluation, many elements of the physical examination are unable to be assessed. Assessment and Plan:   Lola Seo is a 40 y.o.  @ 20w2d evaluated by telephone. SIUP:   - PNL: IOB labs: RH pos, Ab screen neg, HIV/RPR/Hep B/C neg, GC/CT not ran?, VZV/Rubella immune, HGB 10.6, Hbg fract pending, UA/Ucx neg, pap NILM. Pap NILM (2018)  - Will need to recollect GC/CT at next in person visit   - Discussed recommended weight gain. Based on prepregnancy BMI 25-35lb. Starting Weight 137lb. - Cont Prenatal vitamins  - Genetic screening: Panorama low risk, Horizon neg  dz   - Vaccines: S/p one dose of [place] vaccine. Rec'd 2nd dose of Covid and flu vaccine.      H/o TAPVR: In previous pregnancy (2018) with care transferred to [facility]. She was told baby would need open heart surgery soon after birth but ended up not needed it. - MFM informed     H/o C/S with  x2: Patient desires TOLAC.     Second child with autism: Early screening for this baby.      Undesired pregnancy: Now she is happy but initially slightly depressed.  FOB supportive. PHQ screening neg (score 1) at initial ob visit     FHx of trisomy 21: Genetic screen negative during this pregnancy      ---------------------------------------  ? Continuity Provider: None thus far  ? Pain mgmt. in labor: TBD  ? Feeding: TBD  ? Circ:  TBD  ? Social: Denies EtOH, smoking, drugs  ? Has seen lactation: No  ---------------------------------------      -Patient informed of her next appointment: 3/30/2022   Future Appointments   Date Time Provider Lakesha Candice   3/30/2022  9:20 AM Hyacinth Figueroa MD [facility] BS AMB     -Advised to come in sooner for any concerns  -Pt informed that after 28 wk she will be asked to do weekly home BP monitoring and it was recommended she buy or borrow a cuff ahead of time. Alternative is going to a grocery store/pharmacy to check. One BP should be checked weekly and written in a log >28 weeks.   -Patient educated on kick counts (after 28 weeks): baby should move 10 times in 2 hours (can be a kick, roll, flutter, swish). -Patient was reminded about social distancing and to avoid going into public when possible. Patient understands that this encounter was a temporary measure, and the importance of further follow up and examination was emphasized. Patient verbalized understanding. I affirm this is a Patient Initiated Episode with an Established Patient who has not had a related appointment within my department in the past 7 days or scheduled within the next 24 hours.   Note: not billable if this call serves to triage the patient into an appointment for the relevant concern      Electronically Signed: Figueroa Lezama MD  Providers location when delivering service: home      ICD-10-CM ICD-9-CM    1. 20 weeks gestation of pregnancy  Z3A.20 V22.2        Pursuant to the emergency declaration under the [facility], [address] [place] [address] authority and the [employer] Act, this Virtual  Visit was conducted, with patient's consent, to reduce the patient's risk of exposure to COVID-19 and provide continuity of care for an established patient. History   Patients past medical, surgical and family histories were personally reviewed and updated. yes    Patient Active Problem List   Diagnosis Code    H/O high risk medication treatment Z92.29    History of  Z98.891    , delivered O34.219              Current Medications/Allergies   Medications and Allergies reviewed:    Current Outpatient Medications   Medication Sig Dispense Refill    prenatal vit-iron fumarate-fa (PRENATAL PLUS WITH IRON) 28 mg iron- 800 mcg tab Take 1 Tab by mouth daily.        No Known Allergies

## 2022-03-09 NOTE — PROGRESS NOTES
I reviewed with the resident the medical history and the resident's findings on the physical examination. I discussed with the resident the patient's diagnosis and concur with the plan. 36yo  @ 20w2d by LMP   1. IUP: RH pos, anatomy scan   2. Hx CS and  x2  3. Family hx Tri21  4.   Family hx autism

## 2022-03-09 NOTE — TELEPHONE ENCOUNTER
Called patient to get her checked in for her telephone ob appointment today at 8:00am with Dr. Danny Mckinley. Left  for patient to give us a call back to get checked in.

## 2022-03-17 ENCOUNTER — DOCUMENTATION ONLY (OUTPATIENT)
Dept: FAMILY MEDICINE CLINIC | Age: 37
End: 2022-03-17

## 2022-03-18 PROBLEM — O34.219 VBAC, DELIVERED: Status: ACTIVE | Noted: 2018-06-05

## 2022-03-19 PROBLEM — Z92.29 H/O HIGH RISK MEDICATION TREATMENT: Status: ACTIVE | Noted: 2018-05-02

## 2022-03-19 PROBLEM — Z98.891 HISTORY OF C-SECTION: Status: ACTIVE | Noted: 2018-06-05

## 2022-03-30 ENCOUNTER — ROUTINE PRENATAL (OUTPATIENT)
Dept: FAMILY MEDICINE CLINIC | Age: 37
End: 2022-03-30

## 2022-03-30 VITALS
HEART RATE: 70 BPM | OXYGEN SATURATION: 99 % | WEIGHT: 141 LBS | SYSTOLIC BLOOD PRESSURE: 99 MMHG | TEMPERATURE: 97.7 F | BODY MASS INDEX: 24.07 KG/M2 | HEIGHT: 64 IN | RESPIRATION RATE: 16 BRPM | DIASTOLIC BLOOD PRESSURE: 62 MMHG

## 2022-03-30 DIAGNOSIS — Z34.90 PREGNANCY, UNSPECIFIED GESTATIONAL AGE: ICD-10-CM

## 2022-03-30 DIAGNOSIS — O99.019 ANTEPARTUM ANEMIA: ICD-10-CM

## 2022-03-30 DIAGNOSIS — Z34.90 PREGNANCY, UNSPECIFIED GESTATIONAL AGE: Primary | ICD-10-CM

## 2022-03-30 DIAGNOSIS — Z92.29 H/O HIGH RISK MEDICATION TREATMENT: ICD-10-CM

## 2022-03-30 DIAGNOSIS — O34.219 VBAC, DELIVERED: ICD-10-CM

## 2022-03-30 DIAGNOSIS — Z98.891 HISTORY OF C-SECTION: ICD-10-CM

## 2022-03-30 LAB
ERYTHROCYTE [DISTWIDTH] IN BLOOD BY AUTOMATED COUNT: 12.8 % (ref 11.5–14.5)
GLUCOSE 1H P 100 G GLC PO SERPL-MCNC: 110 MG/DL (ref 65–140)
HCT VFR BLD AUTO: 33.3 % (ref 35–47)
HGB BLD-MCNC: 10.2 G/DL (ref 11.5–16)
MCH RBC QN AUTO: 28.6 PG (ref 26–34)
MCHC RBC AUTO-ENTMCNC: 30.6 G/DL (ref 30–36.5)
MCV RBC AUTO: 93.3 FL (ref 80–99)
NRBC # BLD: 0 K/UL (ref 0–0.01)
NRBC BLD-RTO: 0 PER 100 WBC
PLATELET # BLD AUTO: 251 K/UL (ref 150–400)
PMV BLD AUTO: 10.6 FL (ref 8.9–12.9)
RBC # BLD AUTO: 3.57 M/UL (ref 3.8–5.2)
WBC # BLD AUTO: 8.1 K/UL (ref 3.6–11)

## 2022-03-30 PROCEDURE — 90686 IIV4 VACC NO PRSV 0.5 ML IM: CPT | Performed by: STUDENT IN AN ORGANIZED HEALTH CARE EDUCATION/TRAINING PROGRAM

## 2022-03-30 PROCEDURE — 0502F SUBSEQUENT PRENATAL CARE: CPT | Performed by: STUDENT IN AN ORGANIZED HEALTH CARE EDUCATION/TRAINING PROGRAM

## 2022-03-30 PROCEDURE — 90715 TDAP VACCINE 7 YRS/> IM: CPT | Performed by: STUDENT IN AN ORGANIZED HEALTH CARE EDUCATION/TRAINING PROGRAM

## 2022-03-30 RX ORDER — LANOLIN ALCOHOL/MO/W.PET/CERES
325 CREAM (GRAM) TOPICAL EVERY OTHER DAY
Qty: 30 TABLET | Refills: 1 | Status: SHIPPED | OUTPATIENT
Start: 2022-03-30 | End: 2022-05-11

## 2022-03-30 NOTE — PROGRESS NOTES
Return OB Visit       Subjective:   Gilberto Kraus 40 y.o.  at [place]:  34w6d, MELL: Estimated Date of Delivery: 22   by AAKASH BROOKE. Patient reports feeling well. No new concerns at this time. Patient denies headache, visual disturbances, CP, SOB, RUQ pain, dysuria, and calf tenderness. Pregnancy complicated by AMA, Late to Care, H/o anomalies, Anemia. OB History:  See Chart    LOF: No  Vaginal bleeding: No  Fetal movement (after 20 weeks): Yes   Contractions: No  Taking prenatal vitamins: Yes      Allergies- reviewed:   No Known Allergies  Medications- reviewed:   Current Outpatient Medications   Medication Sig    ferrous sulfate 325 mg (65 mg iron) tablet Take 1 Tablet by mouth every other day.  prenatal vit-iron fumarate-fa (PRENATAL PLUS WITH IRON) 28 mg iron- 800 mcg tab Take 1 Tab by mouth daily. No current facility-administered medications for this visit.      Past Medical History- reviewed:  Past Medical History:   Diagnosis Date     delivery delivered      (vaginal birth after )     , delivered 2018     Past Surgical History- reviewed:   Past Surgical History:   Procedure Laterality Date    HX  SECTION       Social History- reviewed:  Social History     Socioeconomic History    Marital status:      Spouse name: Not on file    Number of children: Not on file    Years of education: Not on file    Highest education level: Not on file   Occupational History    Not on file   Tobacco Use    Smoking status: Never Smoker    Smokeless tobacco: Never Used   Substance and Sexual Activity    Alcohol use: No    Drug use: No    Sexual activity: Yes     Partners: Male   Other Topics Concern    Not on file   Social History Narrative    Not on file     Social Determinants of Health     Financial Resource Strain:     Difficulty of Paying Living Expenses: Not on file   Food Insecurity:     Worried About Running Out of Food in the Last Year: Not on file    Ran Out of Food in the Last Year: Not on file   Transportation Needs:     Lack of Transportation (Medical): Not on file    Lack of Transportation (Non-Medical): Not on file   Physical Activity:     Days of Exercise per Week: Not on file    Minutes of Exercise per Session: Not on file   Stress:     Feeling of Stress : Not on file   Social Connections:     Frequency of Communication with Friends and Family: Not on file    Frequency of Social Gatherings with Friends and Family: Not on file    Attends Christianity Services: Not on file    Active Member of [address] [place] [address] [employer] or Organizations: Not on file    Attends Club or Organization Meetings: Not on file    Marital Status: Not on file   Intimate Partner Violence:     Fear of Current or Ex-Partner: Not on file    Emotionally Abused: Not on file    Physically Abused: Not on file    Sexually Abused: Not on file   Housing Stability:     Unable to Pay for Housing in the Last Year: Not on file    Number of [place] in the Last Year: Not on file    Unstable Housing in the Last Year: Not on file     Immunizations- reviewed:   Immunization History   Administered Date(s) Administered    Influenza Vaccine [employer]) PF (>6 Mo Flulaval, Fluarix, and >3 Yrs [address] [place] [address], [place] [address]) 2015    Tdap 2018     OB History- reviewed:  OB History    Para Term  AB Living   5 4 4     4   SAB IAB Ectopic Molar Multiple Live Births             4      # Outcome Date GA Lbr Yvon/2nd Weight Sex Delivery Anes PTL Lv   5 Current            4 Term 18 39w0d  7 lb 12 oz (3.515 kg) M Vag-Spont  N RAFAEL   3 Term  40w0d  7 lb 10 oz (3.459 kg) M    RAFAEL   2 Term  41w0d  7 lb 10 oz (3.459 kg) M CS-LTranv   RAFAEL      Complications: Other (comment)   1 Term  40w0d  7 lb 4 oz (3.289 kg) M Vag-Spont   RAFAEL      Obstetric Comments   LTCS at [facility] due to Fetal bradycardia.  Baby was later diagnosed with Autism        Objective:     Visit Vitals  BP 99/62 (BP 1 Location: Right upper arm, BP Patient Position: Sitting)   Pulse 70   Temp 97.7 °F (36.5 °C) (Oral)   Resp 16   Ht 5' 4\" (1.626 m)   Wt 141 lb (64 kg)   LMP 10/18/2021 (Exact Date)   SpO2 99%   BMI 24.20 kg/m²       Physical Exam:  GENERAL APPEARANCE: alert, well appearing, in no apparent distress  ABDOMEN: gravid, fundal height 33 cm, FHT present at an average of 134 bpm  PSYCH: normal mood and affect    Labs  No results found for this or any previous visit (from the past 12 hour(s)). Assessment/Plan   40 y.o.  at 34w6d by M US, MELL Estimated Date of Delivery: 22   here for return OB visit. SIUP: (GA changed today from 23 to 34w based on MFM assessment)  - PNL: IOB labs: RH pos, Ab screen neg, HIV/RPR/Hep B/C neg, GC/CT not collected, VZV/Rubella immune, HGB 10.6, Hbg fract pending, UA/Ucx neg, pap NILM. Pap NILM (2018)  - Discussed recommended weight gain. Based on prepregnancy BMI 25-35lb. Starting MYGDWC 310QR. - Cont Prenatal vitamins  - Genetic screening: Panorama low risk, Horizon neg  dz   - Vaccines: S/p one dose of [place] vaccine. Rec'd 2nd dose   - Flu/Tdap today, G/C, 1hr GTT, and CBC      H/o TAPVR: In previous pregnancy (2018) with care transferred to [facility]. She was told baby would need open heart surgery soon after birth but ended up not needed it. - MFM US: NO fetal anomalies identified, Discussed by Dr. Chetan Muñoz and Sarah Beth Nair who agreed no fetal echo was indicated.      H/o C/S with  x2: Patient desires TOLAC.     Second child with autism: Early screening for this baby.      Undesired pregnancy: Now she is happy but initially slightly depressed. FOB supportive.  PHQ screening neg (score 1) at initial ob visit     FHx of trisomy 21: Genetic screen negative during this pregnancy       *LOF (Sterile Speculum Exam (preferrably w/o lube), vaginal vault pooling, Ferning, and Nitrazine (+ if pH > 6)  *Labor Precautions given (ROM, Painful Contraction every 5 min for > 1hr)  *Patient educated on kick counts (after 28 weeks): baby should move 10X in 2 hours (can be a kick, roll, flutter, swish). Orders Placed This Encounter    TN IMMUNIZ ADMIN,1 SINGLE/COMB VAC/TOXOID    TN IMMUNIZ,ADMIN,EACH ADDL    INFLUENZA VIRUS VAC QUAD,SPLIT,PRESV FREE SYRINGE IM (Flulaval, Fluzone, Fluarix) ([id number])    TETANUS, DIPHTHERIA TOXOIDS AND ACELLULAR PERTUSSIS VACCINE (TDAP), IN INDIVIDS. >=7, IM    CHLAMYDIA / GC-AMPLIFIED     Standing Status:   Future     Number of Occurrences:   1     Standing Expiration Date:   3/30/2023     Order Specific Question:   Specimen source     Answer:   Urine [258]    GLUCOSE, GESTATIONAL 1 HR TOLERANCE     Standing Status:   Future     Standing Expiration Date:   4/30/2022    CBC W/O DIFF     Standing Status:   Future     Standing Expiration Date:   3/30/2023    ferrous sulfate 325 mg (65 mg iron) tablet     Sig: Take 1 Tablet by mouth every other day. Dispense:  30 Tablet     Refill:  1     Labor precautions discussed, including: Regular painful contractions, lasting for greater than one hour, taking your breath away; any vaginal bleeding; any leakage of fluid; or absent or decreased fetal movement. Call M.D. on call if any of these symptoms or signs occur. I have discussed the diagnosis with the patient and the intended plan as seen in the above orders. The patient has received an after-visit summary and questions were answered concerning future plans. I have discussed medication side effects and warnings with the patient as well. Informed pt to return to the office or go to the ER if she experiences vaginal bleeding, vaginal discharge, leaking of fluid, pelvic cramping.     Pt seen and discussed with Dr. Tawny Edwards (attending physician)    Keyla Roberson MD  Family Medicine Resident

## 2022-03-30 NOTE — PROGRESS NOTES
Brandon Rinaldi is a 40 y.o. female    Chief Complaint   Patient presents with    Routine Prenatal Visit     Patient is 23 weeks and 2 days. She is not having any vaginal bleeding or discharge. She is having fetal movement. She is taking her prenatal vitamins. No contractions. She is having some hip pain. No other concerns. 1. Have you been to the ER, urgent care clinic since your last visit? Hospitalized since your last visit? No  2. Have you seen or consulted any other health care providers outside of the [address] [place] [address] since your last visit? Include any pap smears or colon screening. No      Visit Vitals  BP 99/62 (BP 1 Location: Right upper arm, BP Patient Position: Sitting)   Pulse 70   Temp 97.7 °F (36.5 °C) (Oral)   Resp 16   Ht 5' 4\" (1.626 m)   Wt 141 lb (64 kg)   SpO2 99%   BMI 24.20 kg/m²           Health Maintenance Due   Topic Date Due    Depression Screen  Never done    COVID-19 Vaccine (1) Never done    Flu Vaccine (1) 09/01/2021         Medication Reconciliation completed, changes noted.   Please  Update medication list.

## 2022-03-30 NOTE — PROGRESS NOTES
I reviewed with the resident the medical history and the resident's findings on the physical examination. I discussed with the resident the patient's diagnosis and concur with the plan. 36yo  @ 34w6d by 30wk US   1. IUP: RH pos, anatomy normal, GTT+CBC today, s/p tdap   2. Hx CS and  x2  3. Family hx Tri21  4. Family hx autism   5. Hx child with TAPVR: NIPT low risk, MFM originally scheduling echo but then it was cancelled by cards as deemed not necessary   6.   Poor dating criteria: by 30wk scan

## 2022-04-02 LAB
C TRACH RRNA SPEC QL NAA+PROBE: NEGATIVE
N GONORRHOEA RRNA SPEC QL NAA+PROBE: NEGATIVE
SPECIMEN SOURCE: NORMAL

## 2022-04-07 ENCOUNTER — TELEPHONE (OUTPATIENT)
Dept: FAMILY MEDICINE CLINIC | Age: 37
End: 2022-04-07

## 2022-04-13 ENCOUNTER — ROUTINE PRENATAL (OUTPATIENT)
Dept: FAMILY MEDICINE CLINIC | Age: 37
End: 2022-04-13

## 2022-04-13 ENCOUNTER — TELEPHONE (OUTPATIENT)
Dept: FAMILY MEDICINE CLINIC | Age: 37
End: 2022-04-13

## 2022-04-13 DIAGNOSIS — Z3A.36 36 WEEKS GESTATION OF PREGNANCY: Primary | ICD-10-CM

## 2022-04-13 PROCEDURE — 0502F SUBSEQUENT PRENATAL CARE: CPT | Performed by: STUDENT IN AN ORGANIZED HEALTH CARE EDUCATION/TRAINING PROGRAM

## 2022-04-13 NOTE — TELEPHONE ENCOUNTER
Called patient to get her checked in for her telephone ob appointment. Left vm for patient to call and get checked in.

## 2022-04-13 NOTE — PROGRESS NOTES
Donte Henry  40 y.o. female  1985  [facility]  [id number]    [phone] (home)      [address]:    [facility] Telephone Encounter  Avis Pringle MD       Encounter Date: 2022 at 6:59 AM    Consent:  She and/or the health care decision maker is aware that this encounter will be billed as a routine OB appointment and that she may receive a bill for this telephone service, depending on her insurance coverage, and has provided verbal consent to proceed: Yes    Follow-up Prenatal     History of Present Illness     40 y.o.  @ 36w6d  By 30 wk US. Patient states she feels well. No new concerns at this time. Contractions: no  LOF: no  Vaginal bleeding: no  Fetal movement (if >20 wk): yes    Patient denies headache, visual disturbances, CP, SOB, RUQ pain, dysuria, and calf tenderness. Vitals/Objective:   General: Patient speaking in complete sentences without effort. Normal speech and cooperative. Due to this being a Virtual Check-in/Telephone evaluation, many elements of the physical examination are unable to be assessed. Assessment and Plan:   Donte Henry is a 40 y.o.  @ 36w6d by 30 wk scan evaluated by telephone. SIUP:   IOB labs: RH pos, Ab screen neg, HIV/RPR/Hep B/C neg, GC/CT not collected on IOB (collected on  and neg), VZV/Rubella immune, HGB 10.6, Hbg fract pending, UA/Ucx neg, Pap NILM (2018), 1 hr GTT wnl  - Cont Prenatal vitamins  - Genetic screening: Panorama low risk, Horizon neg  dz   - Vaccines: S/p flu/ TDap vaccine, Received 1 dose of Covid Pfizer vaccine. Rec'd 2nd dose   - Needs GBS collected, sent SFFP message to schedule sooner appointment for early next week.        PREGNANCY CONCERNS:    Anemia in pregnancy: Third tri CBC Hgb 10.2  - Continue iron daily    H/o TAPVR: In previous pregnancy (2018) with care transferred to [facility].  She was told baby would need open heart surgery soon after birth but ended up not needed it. - MFM US: NO fetal anomalies identified, Discussed by Dr. Angelina Amin and Phill Rinne who agreed no fetal echo was indicated.      H/o C/S with  x2: Patient desires TOLAC.     Second child with autism: Early screening for this baby.      Undesired pregnancy: Now she is happy but initially slightly depressed. FOB supportive. PHQ screening neg (score 1) at initial ob visit     FHx of trisomy 21: Genetic screen negative during this pregnancy. Poor dating criteria: by 30wk scan         -Patient informed of her next appointment: 2022 (confirm if telephone or in office and inform patient)  -Advised to come in sooner for any concerns  -Pt informed that after 28 wk she will be asked to do weekly home BP monitoring and it was recommended she buy or borrow a cuff ahead of time. Alternative is going to a grocery store/pharmacy to check. One BP should be checked weekly and written in a log >28 weeks.  -Patient educated on kick counts (after 28 weeks): baby should move 10 times in 2 hours (can be a kick, roll, flutter, swish). -Patient was reminded about social distancing and to avoid going into public when possible. Patient understands that this encounter was a temporary measure, and the importance of further follow up and examination was emphasized. Patient verbalized understanding. I affirm this is a Patient Initiated Episode with an Established Patient who has not had a related appointment within my department in the past 7 days or scheduled within the next 24 hours.   Note: not billable if this call serves to triage the patient into an appointment for the relevant concern      Electronically Signed: Refugio Milian MD  Providers location when delivering service: home      ICD-10-CM ICD-9-CM    1. 36 weeks gestation of pregnancy  Z3A.36 V22.2 CULTURE, GENITAL GROUP B STREP       Pursuant to the emergency declaration under the [employer] and the [address] and the Coronavirus Preparedness and [employer] Act, this Virtual  Visit was conducted, with patient's consent, to reduce the patient's risk of exposure to COVID-19 and provide continuity of care for an established patient. History   Patients past medical, surgical and family histories were personally reviewed and updated. yes    Patient Active Problem List   Diagnosis Code    H/O high risk medication treatment Z92.29    Pregnancy Z34.90    History of  Z98.891    , delivered O31.200    Antepartum anemia O99.019              Current Medications/Allergies   Medications and Allergies reviewed:    Current Outpatient Medications   Medication Sig Dispense Refill    ferrous sulfate 325 mg (65 mg iron) tablet Take 1 Tablet by mouth every other day. 30 Tablet 1    prenatal vit-iron fumarate-fa (PRENATAL PLUS WITH IRON) 28 mg iron- 800 mcg tab Take 1 Tab by mouth daily.        No Known Allergies

## 2022-04-15 ENCOUNTER — TELEPHONE (OUTPATIENT)
Dept: FAMILY MEDICINE CLINIC | Age: 37
End: 2022-04-15

## 2022-04-15 NOTE — TELEPHONE ENCOUNTER
----- Message from Rosa Yarbrough sent at 4/14/2022  5:18 PM EDT -----  Subject: Message to Provider    QUESTIONS  Information for Provider? patient wants a call back to schedule 4/18/   appointment  ---------------------------------------------------------------------------  --------------  CALL BACK INFO  What is the best way for the office to contact you? OK to leave message on   voicemail  Preferred Call Back Phone Number? [phone]  ---------------------------------------------------------------------------  --------------  SCRIPT ANSWERS  Relationship to Patient?  Self

## 2022-04-18 ENCOUNTER — ROUTINE PRENATAL (OUTPATIENT)
Dept: FAMILY MEDICINE CLINIC | Age: 37
End: 2022-04-18

## 2022-04-18 VITALS
SYSTOLIC BLOOD PRESSURE: 97 MMHG | OXYGEN SATURATION: 97 % | RESPIRATION RATE: 18 BRPM | WEIGHT: 142.2 LBS | HEIGHT: 64 IN | BODY MASS INDEX: 24.28 KG/M2 | DIASTOLIC BLOOD PRESSURE: 59 MMHG | HEART RATE: 93 BPM

## 2022-04-18 DIAGNOSIS — Z3A.37 37 WEEKS GESTATION OF PREGNANCY: Primary | ICD-10-CM

## 2022-04-18 PROCEDURE — 0502F SUBSEQUENT PRENATAL CARE: CPT | Performed by: STUDENT IN AN ORGANIZED HEALTH CARE EDUCATION/TRAINING PROGRAM

## 2022-04-18 NOTE — PROGRESS NOTES
Chief Complaint   Patient presents with    Routine Prenatal Visit     check up     Visit Vitals  BP (!) 97/59 (BP 1 Location: Right upper arm, BP Patient Position: Sitting, BP Cuff Size: Adult)   Pulse 93   Resp 18   Ht 5' 4\" (1.626 m)   Wt 142 lb 3.2 oz (64.5 kg)   SpO2 97%   BMI 24.41 kg/m²

## 2022-04-18 NOTE — PROGRESS NOTES
I reviewed with the resident the medical history and the resident's findings on the physical examination. I discussed with the resident the patient's diagnosis and concur with the plan. Bhargav Amos @ 37w4d by 30wk US   1. IUP: RH pos, anatomy normal, GTT+CBC ok, s/p tdap, GBS collected, cephalic by sono   2. Hx CS and  x2  3. Family hx Tri21  4. Family hx autism   5. Hx child with TAPVR: NIPT low risk, M originally scheduling echo but then it was cancelled by cards as deemed not necessary   6.   Poor dating criteria: by 30wk scan

## 2022-04-18 NOTE — PATIENT INSTRUCTIONS
Semana 40 de mims embarazo: Instrucciones de cuidado  Week 37 of Your Pregnancy: Care Instructions  Instrucciones de cuidado     Usted está cerca del final de mims embarazo, y probablemente esté bastante incómoda. Puede ser más difícil caminar. Acostarse probablemente tampoco sea cómodo. Podría tener dificultad para dormir o para permanecer dormida. La mayoría de las mujeres leoncio a kemar entre las 40 y 43 semanas. Liza es un buen momento para pensar en preparar un maletín para el hospital con los artículos que necesitará. Entonces estará lista para cuando comience el [place] gabriel Craig. La atención de seguimiento es júnior parte clave de mims tratamiento y seguridad. Asegúrese de hacer y acudir a todas las citas, y llame a mims médico si está teniendo problemas. También es júnior buena idea saber los resultados de david exámenes y mantener júnior lista de los medicamentos que elke. ¿Cómo puede cuidarse en el hogar? Aprenda sobre el amamantamiento  · El amamantamiento es lo mejor para mims bebé y Marmarth Ramirez es lambert para usted. · La leche materna tiene anticuerpos que ayudan al bebé a combatir las infecciones. · Las madres que amamantan suelen bajar de peso más rápidamente, debido a que elaborar leche quema calorías. · Informarse acerca de las mejores maneras de sostener a mims bebé le facilitará el amamantamiento. · Deje que mims stefano bañe y [employer] pañales del bebé para que no se sienta excluida. Acurrúquense juntos cuando amamante a mims bebé. · Es posible que desee aprender a usar un sacaleches y guardar mims Prince Frederick Diaz. · Si elige alimentar a mims bebé con biberón, hágalo de la [employer] resulte más parecida al amamantamiento para que pueda establecer un vínculo con mims bebé. Siempre sostenga al bebé y el biberón. No apoye el biberón ni deje que mims bebé se quede dormido con él. Aprenda sobre el llanto  · Es normal que los bebés lloren de 1 a 3 horas al día. Algunos lloran más, otros menos.   · Los bebés no lloran para causarle molestias ni porque usted sea [facility]. · Llorar es la forma de comunicarse que tiene el bebé. Mims bebé puede [employer] o gases, necesitar un cambio de pañal, sentir frío o calor, sentirse solo o tenso. A veces, los bebés lloran por motivos desconocidos. · Si usted responde a las necesidades de mims bebé, herve aprenderá a confiar en usted. · Intente mantener la calma cuando mims bebé llore. Mims bebé se puede sentir más molesto si siente que usted Ranger. Sepa cómo cuidar a mims recién nacido  · El muñón del cordón umbilical de mims bebé se caerá solo, por lo general entre las semanas 1 y 2. Para cuidar la chary del cordón umbilical de mims bebé:  ? Limpie la chary de la parte inferior del cordón umbilical 2 o 3 veces al día. ? Ponga especial atención en la chary en donde el cordón se fija a la piel. ? Mantenga el pañal doblado debajo del cordón. ? Utilice júnior toallita húmeda o algodón para darle un baño de esponja a mims bebé hasta que se le haya caído el muñón. · La primera evacuación intestinal oscura de mims bebé se conoce atul meconio. Después del meconio, el bebé tendrá david propios hábitos de evacuación intestinal.  ? Algunos bebés, especialmente aquellos que se alimentan con [address], tienen varias evacuaciones al día. Otros tienen [employer] al día, o júnior cada 2 o 3 días. ? Los bebés que son amamantados a menudo tienen evacuaciones sueltas amarillentas. Los bebés que se alimentan con leche de fórmula evacuan heces más sólidas. ? Si mims bebé tiene evacuaciones atul bolitas pequeñas, está estreñido. Después de 2 aislinn de estreñimiento, llame al médico de mims bebé. · Si mims bebé va a ser [facility], usted puede cuidarlo en el hogar. ? Enjuáguele delicadamente el pene con agua tibia cada vez que le cambie los pañales. No intente retirar la membrana que se forma sobre el pene. Esta membrana desaparecerá por sí martha. Séquele la chary con toques suaves de toalla.   ? [employer] a base de petróleo, atul vaselina, en la chary del pañal que tendrá contacto con el pene de mims bebé. [place] evitará que el pañal se le pegue al bebé. ? Pregúntele al médico acerca de darle acetaminofén (Tylenol) a mims bebé para el dolor. ¿Dónde puede encontrar más información en inglés? Vaya a http://www.gray.com/  Dharmesh [id number] en la búsqueda para aprender más acerca de \"Semana 40 de mims embarazo: Instrucciones de cuidado. \"  Revisado: 16 junio, 2021               Versión del contenido: 13.2  © 2147-8057 Healthwise, Incorporated. Las instrucciones de cuidado fueron adaptadas bajo licencia por [employer] (which disclaims liability or warranty for this information). Si usted tiene [place] afección médica o sobre estas instrucciones, siempre pregunte a mims profesional de jong. Healthwise, Incorporated niega toda garantía o responsabilidad por mims uso de esta información. Lewellen Ripple a mims médico anthony el embarazo (después de 20 semanas)  Learning About When to Call Your Doctor During Pregnancy (After 20 Weeks)  Instrucciones de cuidado  Es normal que tenga inquietudes acerca de lo que podría ser un problema anthony el [facility]. Aunque la mayoría de las mujeres embarazadas no tienen ningún problema grave, es importante saber cuándo llamar a mims médico si tiene determinados síntomas o señales de trabajo de [place]. Estas son algunas sugerencias generales. Mims médico puede darle más información sobre cuándo llamar. Cuándo llamar a mims médico (después de 20 semanas)  Llame al 911  en cualquier momento que considere que necesita atención de [place]. Por ejemplo, llame si:  · Tiene sangrado vaginal intenso. · Tiene dolor repentino e intenso en el abdomen. · Se desmayó (perdió el conocimiento). · Tiene júnior convulsión.   · Ve o siente el cordón umbilical.  · Grace que está a punto de dahlia a kemar a mims bebé y no puede llegar en forma jaramillo al hospital.  Sera Marx a mims médico ahora mismo o busque atención médica inmediata si:  · Tiene sangrado vaginal.  · Tiene dolor en el abdomen. · Tiene fiebre. · Tiene síntomas de preeclampsia, atul:  ? Hinchazón repentina de la sol, las ines o los pies. ? Nuevos problemas de visión (atul oscurecimiento, rustam borroso o rustam puntos). ? Dolor de ari intenso. · Tiene júnior pérdida repentina de líquido por la vagina. (Piensa que rompió la marcell). · Piensa que puede ba comenzado el [place] de [place]. [place] significa que ha tenido al menos 6 contracciones en [employer]. · Nota que mims bebé ha dejado de moverse o lo hace mucho menos de lo habitual.  · Tiene síntomas de júnior infección urinaria. Estos pueden incluir:  ? Dolor o ardor al orinar. ? Necesidad de orinar con frecuencia sin poder eliminar mucha orina. ? Dolor en el flanco, que se encuentra sofiya debajo de la caja torácica y [place] de la cintura en un lado de la espalda. ? [employer]. Preste especial atención a los cambios en mims jong y asegúrese de comunicarse con mims médico si:  · Tiene flujo vaginal con un olor desagradable. · Tiene cambios en la piel, tales atul:  ? Salpullido. ? Comezón. ? Color amarillento en la piel. · Tiene otras inquietudes acerca de mims embarazo. Si tiene signos de trabajo de parto al llegar a las 37 [address] o más  Si tiene señales de [place] de parto a las 37 semanas o [place], es posible que mims médico le diga que llame cuando mims trabajo de parto se vuelva más [place]. Los síntomas del trabajo de parto activo incluyen:  · Contracciones que son regulares. · Contracciones a intervalos de menos de 5 minutos. · Contracciones anthony las cuales es difícil hablar. La atención de seguimiento es júnior parte clave de mims tratamiento y seguridad. Asegúrese de hacer y acudir a todas las citas, y llame a mims médico si está teniendo problemas. También es júnior buena idea saber los resultados de david exámenes y mantener júnior lista de los medicamentos que elke.   ¿Dónde puede encontrar más información en inglés? Vaya a http://www.gray.com/  Dharmesh [id number] en la búsqueda para aprender más acerca de \"Aprenda cuándo llamar a mims médico anthony el embarazo (después de 20 semanas). \"  Revisado: 16 junio, 2021               Versión del contenido: 13.2  © 1649-6378 Healthwise, Incorporated. Las instrucciones de cuidado fueron adaptadas bajo licencia por [employer] (which disclaims liability or warranty for this information). Si usted tiene [place] afección médica o sobre estas instrucciones, siempre pregunte a mims profesional de jong. Healthwise, Incorporated niega toda garantía o responsabilidad por mims uso de esta información.

## 2022-04-18 NOTE — PROGRESS NOTES
Return OB Visit       Subjective:   Ken Marks 40 y.o.   MELL: 2022, by Ultrasound  GA:  37w4d. Due to language barrier, an  was present during the history-taking and subsequent discussion (and for part of the physical exam) with this patient. Patient feels well. Has had some trouble sleeping, but is not concerned. Also feels a lot of pelvic pressure but no pain. Having some cramping but does not think they are contractions. LOF: No  Vaginal bleeding: No  Fetal movement (after 20 weeks): Yes  Contractions: No    Pt denies fever, chills, HA, vision disturbances, RUQ pain, chest pain, SOB, N/V/D, constipation, urinary problems, foul smelling vaginal discharge, LE Edema worse than usual.     OB History    Para Term  AB Living   5 4 4     4   SAB IAB Ectopic Molar Multiple Live Births             4      # Outcome Date GA Lbr Yvon/2nd Weight Sex Delivery Anes PTL Lv   5 Current            4 Term 18 39w0d  7 lb 12 oz (3.515 kg) M Vag-Spont  N RAFAEL   3 Term  40w0d  7 lb 10 oz (3.459 kg) M    RAFAEL   2 Term  41w0d  7 lb 10 oz (3.459 kg) M CS-LTranv   RAFAEL      Complications: Other (comment)   1 Term  40w0d  7 lb 4 oz (3.289 kg) M Vag-Spont   RAFAEL      Obstetric Comments   LTCS at [facility] due to Fetal bradycardia. Baby was later diagnosed with Autism       Allergies- reviewed:   No Known Allergies  Medications- reviewed:   Current Outpatient Medications   Medication Sig    ferrous sulfate 325 mg (65 mg iron) tablet Take 1 Tablet by mouth every other day.  prenatal vit-iron fumarate-fa (PRENATAL PLUS WITH IRON) 28 mg iron- 800 mcg tab Take 1 Tab by mouth daily. No current facility-administered medications for this visit.      Past Medical History- reviewed:  Past Medical History:   Diagnosis Date    Antepartum anemia 3/30/2022     delivery delivered      (vaginal birth after )     , delivered 2018     Past Surgical History- reviewed:   Past Surgical History:   Procedure Laterality Date    HX  SECTION       Social History- reviewed:  Social History     Socioeconomic History    Marital status:      Spouse name: Not on file    Number of children: Not on file    Years of education: Not on file    Highest education level: Not on file   Occupational History    Not on file   Tobacco Use    Smoking status: Never Smoker    Smokeless tobacco: Never Used   Substance and Sexual Activity    Alcohol use: No    Drug use: No    Sexual activity: Yes     Partners: Male   Other Topics Concern    Not on file   Social History Narrative    Not on file     Social Determinants of Health     Financial Resource Strain:     Difficulty of Paying Living Expenses: Not on file   Food Insecurity:     Worried About Running Out of Food in the Last Year: Not on file    Epi of Food in the Last Year: Not on file   Transportation Needs:     Lack of Transportation (Medical): Not on file    Lack of Transportation (Non-Medical):  Not on file   Physical Activity:     Days of Exercise per Week: Not on file    Minutes of Exercise per Session: Not on file   Stress:     Feeling of Stress : Not on file   Social Connections:     Frequency of Communication with Friends and Family: Not on file    Frequency of Social Gatherings with Friends and Family: Not on file    Attends Judaism Services: Not on file    Active Member of [address] [place] [address] [employer] or Organizations: Not on file    Attends Club or Organization Meetings: Not on file    Marital Status: Not on file   Intimate Partner Violence:     Fear of Current or Ex-Partner: Not on file    Emotionally Abused: Not on file    Physically Abused: Not on file    Sexually Abused: Not on file   Housing Stability:     Unable to Pay for Housing in the Last Year: Not on file    Number of [place] in the Last Year: Not on file    Unstable Housing in the Last Year: Not on file     Immunizations- reviewed:   Immunization History   Administered Date(s) Administered    Influenza Vaccine Thwapr) PF (>6 Mo Flulaval, Fluarix, and >3 Yrs Talha Morton 46[address]) 2015, 2022    Tdap 2018, 2022         Objective:     Visit Vitals  BP (!) 97/59 (BP 1 Location: Right upper arm, BP Patient Position: Sitting, BP Cuff Size: Adult)   Pulse 93   Resp 18   Ht 5' 4\" (1.626 m)   Wt 142 lb 3.2 oz (64.5 kg)   LMP 10/18/2021 (Exact Date)   SpO2 97%   BMI 24.41 kg/m²       Physical Exam:  GENERAL APPEARANCE: alert, well appearing, in no apparent distress  ABDOMEN: gravid, Fundal height 36cm FHT present at 135  bpm  PSYCH: normal mood and affect   Cervix: High/thick/closed, position posterior, fluid absent. Presentation: Cephalic, confirmed via BSUS    Media Information             Document Information    Photographic Image:  Mobile Media Capture      2022 14:48   Attached To:   Routine Prenatal on 22 with Barbie Tejada MD     Source Information    Barbie Tejada MD  [facility]       Labs  No results found for this or any previous visit (from the past 12 hour(s)). Assessment         ICD-10-CM ICD-9-CM    1. 37 weeks gestation of pregnancy  Z3A.37 V22.2 CULTURE, GENITAL GROUP B STREP         Plan   40 y.o.  37w4d MELL 2022, by Ultrasound here for return OB visit       SIUP at 37w4d:  -PNL: O+, Ab neg, HIV/RPR NR, HepB/C neg, GC/CT neg, VZV/Rubella imm, Hgb 10.6, Hbg fract wnl, UA/Ucx neg, Pap NILM (2018), 1hr GTT wnl. GBS collected today. -Genetics: Panorama low-risk, Horizon neg  dz   -Vaccines: S/p flu, TDap, and COVID vaccine  -Ultrasound: at 30+4 - EFW 55%, BPD c/w dates, anatomy normal. Placenta fundal w/ 3-vessel cord. Norm amn fl vol.         PREGNANCY CONCERNS:     Anemia in pregnancy: 3'tri Hgb 10.2.  - Continue iron daily     H/o TAPVR: In previous pregnancy (2018) with care transferred to [address] [place] [address].  She was told baby would need open heart surgery soon after birth but ended up not needed it. - MFM US: NO fetal anomalies identified, Discussed by Dr. Justice Brown and Latonya Zavala who agreed no fetal echo was indicated.      H/o C/S with  x2: Patient desires TOLAC.     Second child with autism: Early screening for this baby.      Undesired pregnancy: Now she is happy but initially slightly depressed. FOB supportive. PHQ screening neg (score 1) at initial ob visit     FHx of trisomy 21: Genetic screen negative during this pregnancy.     Poor dating criteria: by 30wk scan        BIRTH PLAN  · Continuity Provider: Cherise Roy  · Social: Denies Tobacco, EtoH or illict drugs. Return in 1 week for 38-week prenatal visit. Orders Placed This Encounter    CULTURE, GENITAL GROUP B STREP     Standing Status:   Future     Standing Expiration Date:   2023     Labor precautions discussed, including: Regular painful contractions, lasting for greater than one hour, taking your breath away; any vaginal bleeding; any leakage of fluid; or absent or decreased fetal movement. Call M.D. on call if any of these symptoms or signs occur. I have discussed the diagnosis with the patient and the intended plan as seen in the above orders. The patient has received an after-visit summary and questions were answered concerning future plans. I have discussed medication side effects and warnings with the patient as well. Informed pt to return to the office or go to the ER if she experiences vaginal bleeding, vaginal discharge, leaking of fluid, pelvic cramping.     Pt seen and discussed with Dr. Saturnino Delaney (attending physician)    Isabel Winston MD  Family Medicine Resident

## 2022-04-20 ENCOUNTER — TELEPHONE (OUTPATIENT)
Dept: FAMILY MEDICINE CLINIC | Age: 37
End: 2022-04-20

## 2022-04-20 NOTE — TELEPHONE ENCOUNTER
I call pt with  to reschedule prenatal 4.27.22 appt. I left pt a v/m siddhartha give us a call back.

## 2022-04-22 LAB
BACTERIA SPEC CULT: NORMAL
SERVICE CMNT-IMP: NORMAL

## 2022-04-27 ENCOUNTER — ROUTINE PRENATAL (OUTPATIENT)
Dept: FAMILY MEDICINE CLINIC | Age: 37
End: 2022-04-27

## 2022-04-27 VITALS
HEART RATE: 73 BPM | OXYGEN SATURATION: 98 % | TEMPERATURE: 97.3 F | HEIGHT: 64 IN | WEIGHT: 144.4 LBS | DIASTOLIC BLOOD PRESSURE: 75 MMHG | BODY MASS INDEX: 24.65 KG/M2 | RESPIRATION RATE: 18 BRPM | SYSTOLIC BLOOD PRESSURE: 119 MMHG

## 2022-04-27 DIAGNOSIS — Z3A.38 38 WEEKS GESTATION OF PREGNANCY: Primary | ICD-10-CM

## 2022-04-27 PROCEDURE — 0502F SUBSEQUENT PRENATAL CARE: CPT

## 2022-04-27 NOTE — PROGRESS NOTES
Return OB Visit       Subjective:   Ken Marks 40 y.o. [id number] at 38w6d, Estimated Date of Delivery: 22 by U/s on 2022     used:  Patient reports feeling well. No new concerns at this time. Patient denies headache, visual disturbances, CP, SOB, RUQ pain, dysuria, and calf tenderness. Pregnancy complicated by Anemia. OB History:  See Chart    LOF: No  Vaginal bleeding: Noted on  and , small amounts spotting noted after urinating  Fetal movement (after 20 weeks): Yes  Contractions: No  Taking prenatal vitamins: Yes  Taking ASA: No  Taking iron: yes      Allergies- reviewed:   No Known Allergies  Medications- reviewed:   Current Outpatient Medications   Medication Sig    ferrous sulfate 325 mg (65 mg iron) tablet Take 1 Tablet by mouth every other day.  prenatal vit-iron fumarate-fa (PRENATAL PLUS WITH IRON) 28 mg iron- 800 mcg tab Take 1 Tab by mouth daily. No current facility-administered medications for this visit.      Past Medical History- reviewed:  Past Medical History:   Diagnosis Date    Antepartum anemia 3/30/2022     delivery delivered      (vaginal birth after )     , delivered 2018     Past Surgical History- reviewed:   Past Surgical History:   Procedure Laterality Date    HX  SECTION       Social History- reviewed:  Social History     Socioeconomic History    Marital status:      Spouse name: Not on file    Number of children: Not on file    Years of education: Not on file    Highest education level: Not on file   Occupational History    Not on file   Tobacco Use    Smoking status: Never Smoker    Smokeless tobacco: Never Used   Substance and Sexual Activity    Alcohol use: No    Drug use: No    Sexual activity: Yes     Partners: Male   Other Topics Concern    Not on file   Social History Narrative    Not on file     Social Determinants of Health     Financial Resource Strain:    Kathleen Dalton Difficulty of Paying Living Expenses: Not on file   Food Insecurity:     Worried About Running Out of Food in the Last Year: Not on file    Ran Out of Food in the Last Year: Not on file   Transportation Needs:     Lack of Transportation (Medical): Not on file    Lack of Transportation (Non-Medical): Not on file   Physical Activity:     Days of Exercise per Week: Not on file    Minutes of Exercise per Session: Not on file   Stress:     Feeling of Stress : Not on file   Social Connections:     Frequency of Communication with Friends and Family: Not on file    Frequency of Social Gatherings with Friends and Family: Not on file    Attends Adventism Services: Not on file    Active Member of [address] [place] [address] [employer] or Organizations: Not on file    Attends Club or Organization Meetings: Not on file    Marital Status: Not on file   Intimate Partner Violence:     Fear of Current or Ex-Partner: Not on file    Emotionally Abused: Not on file    Physically Abused: Not on file    Sexually Abused: Not on file   Housing Stability:     Unable to Pay for Housing in the Last Year: Not on file    Number of [place] in the Last Year: Not on file    Unstable Housing in the Last Year: Not on file     Immunizations- reviewed:   Immunization History   Administered Date(s) Administered    Influenza Vaccine echoBase) PF (>6 Mo Flulaval, Fluarix, and [facility] 2015, 20[facility]    Tdap 2018, 2022     Tdap and Flu updated    OB History- reviewed:  OB History    Para Term  AB Living   5 4 4     4   SAB IAB Ectopic Molar Multiple Live Births             4      # Outcome Date GA Lbr Yvon/2nd Weight Sex Delivery Anes PTL Lv   5 Current            4 Term 18 39w0d  7 lb 12 oz (3.515 kg) M Vag-Spont  N RAFAEL   3 Term  40w0d  7 lb 10 oz (3.459 kg) M    RAFAEL   2 Term  41w0d  7 lb 10 oz (3.459 kg) M CS-LTranv   RAFAEL      Complications:  Other (comment)   1 Term  40w0d  7 lb 4 oz (3.289 kg) M Vag-Spont   RAFAEL      Obstetric Comments   LTCS at [facility] due to Fetal bradycardia. Baby was later diagnosed with Autism        Objective:     Visit Vitals  /75 (BP 1 Location: Left upper arm, BP Patient Position: Sitting, BP Cuff Size: Adult)   Pulse 73   Temp 97.3 °F (36.3 °C) (Temporal)   Resp 18   Ht 5' 4\" (1.626 m)   Wt 144 lb 6.4 oz (65.5 kg)   LMP 10/18/2021 (Exact Date)   SpO2 98%   BMI 24.79 kg/m²       Physical Exam:  GENERAL APPEARANCE: alert, well appearing, in no apparent distress  ABDOMEN: gravid, fundal height 37 cm, FHT present at an average of 145 bpm  PSYCH: normal mood and affect  SVE: 3/0/-3, no blood appreciated to glove following assessment     Labs  No results found for this or any previous visit (from the past 12 hour(s)). Assessment         ICD-10-CM ICD-9-CM    1. 38 weeks gestation of pregnancy  Z3A.38 V22.2          Plan   40 y.o.  38w6d MELL 2022 by Ultrasound on 22 here for return OB visit.       SIUP at 38w6d:  -PNL: O+, Ab neg, HIV/RPR NR, HepB/C neg, GC/CT neg, VZV/Rubella imm, Hgb 10.6, Hbg fract wnl, UA/Ucx neg, Pap NILM (2018), 1hr GTT wnl. GBS negative. -Genetics: Panorama low-risk, Horizon neg  dz   -Vaccines: S/p flu, TDap, and COVID vaccine  -Ultrasound: at 30+4 - EFW 55%, BPD c/w dates, anatomy normal. Placenta fundal w/ 3-vessel cord. Norm amn fl vol.      PREGNANCY CONCERNS:     Anemia in pregnancy: 3'tri Hgb 10.2.  - Continue iron daily     H/o TAPVR: In previous pregnancy (2018) with care transferred to [facility]. She was told baby would need open heart surgery soon after birth but ended up not needed it. - MFM US: NO fetal anomalies identified, Discussed by Dr. Anselmo Hu and Irina Gong who agreed no fetal echo was indicated.      H/o C/S with  x2: Patient desires TOLAC.     Second child with autism: Early screening for this baby.      Undesired pregnancy: Now she is happy but initially slightly depressed. FOB supportive.  PHQ screening neg (score 1) at initial ob visit     FHx of trisomy 21: Genetic screen negative during this pregnancy.     Poor dating criteria: by 30wk scan        BIRTH PLAN  ? Continuity Provider: Kike Wong Frederik Squire  ? Social: Denies Tobacco, EtoH or illict drugs. No orders of the defined types were placed in this encounter. Labor precautions discussed, including: Regular painful contractions, lasting for greater than one hour, taking your breath away; any vaginal bleeding; any leakage of fluid; or absent or decreased fetal movement. Call M.D. on call if any of these symptoms or signs occur. I have discussed the diagnosis with the patient and the intended plan as seen in the above orders. The patient has received an after-visit summary and questions were answered concerning future plans. I have discussed medication side effects and warnings with the patient as well. Informed pt to return to the office or go to the ER if she experiences vaginal bleeding, vaginal discharge, leaking of fluid, pelvic cramping.     Pt seen and discussed with Dr. Nura Bhatti (attending physician)    Devonte Aldana MD  Family Medicine Resident

## 2022-04-27 NOTE — PROGRESS NOTES
I reviewed with the resident the medical history and the resident's findings on the physical examination. I discussed with the resident the patient's diagnosis and concur with the plan. +Bloody show     38yo  @ 38w6d by 30wk US   1. IUP: RH pos, anatomy normal, GTT+CBC ok, s/p tdap, GBS neg, cephalic by sono   2. Hx CS and  x2: desires TOLAC   3. Family hx Tri21  4. Family hx autism   5. Hx child with TAPVR: NIPT low risk, MFM originally scheduling echo but then it was cancelled by cards as deemed not necessary   6.   Poor dating criteria: by 30wk scan

## 2022-04-27 NOTE — PATIENT INSTRUCTIONS
Semana 38 de mims embarazo: Instrucciones de cuidado  Week 38 of Your Pregnancy: Care Instructions  Instrucciones de cuidado     Aunque no lo crea, mims bebé está por nacer. Es posible que ya tenga ideas acerca de la personalidad de mims bebé debido a cuánto se mueve. O aleks vez haya notado cómo responde a los sonidos, al calor, al frío y a la kemar. Incluso podría saber qué tipo de [employer] a mims bebé. A estas [place], usted tiene [address] idea de qué puede esperar anthony el [place]. Es posible que haya hablado de david preferencias del nacimiento con mims médico. Monie incluso si usted quiere un nacimiento por vía vaginal, es júnior buena idea aprender [employer] nacimientos por cesárea. Los partos por cesárea son [employer] bebés nacen por medio de un jarrod (incisión) hecho en la parte inferior del abdomen. A veces, es la mejor opción para la jong del bebé y de [place]. Esta hoja de cuidados puede ayudarla a entender los nacimientos por cesárea. También le da información sobre qué esperar después del nacimiento de mims bebé. Y la ayuda a comprender [employer] depresión posparto. La atención de seguimiento es júnior parte clave de mims tratamiento y seguridad. Asegúrese de hacer y acudir a todas las citas, y llame a mims médico si está teniendo problemas. También es júnior buena idea saber los resultados de david exámenes y mantener júnior lista de los medicamentos que elke. ¿Cómo puede cuidarse en el hogar? Aprenda sobre el parto por cesárea  · La mayoría de los partos por cesárea no están planeados. Se hacen por problemas que se producen anthony el trabajo de [place]. Estos problemas podrían incluir:  ? El [place] de parto se vuelve más lento o se detiene. ? Presión arterial justo u otros problemas para la madre.  ? Señales de sufrimiento del bebé. Estas señales pueden incluir júnior frecuencia cardíaca muy rápida o lenta.   · [place] y los bebés están heide después de un parto por Leeann Robertson cesárea es júnior Jessica Boehringer. Tiene más riesgos que un parto vaginal.  · En algunos casos, un parto por cesárea planificado puede ser más seguro que un parto vaginal. Harold puede ser el carrie si:  ? La madre tiene un problema de jong, atul júnior afección cardíaca. ? El bebé no está en posición con la ari para abajo para el parto. Esta posición se llama de nalgas. ? El útero tiene cicatrices de cirugías anteriores. Harold podría aumentar la probabilidad de un desgarro en el Aide Ax[place]. ? Hay un problema con la placenta. ? La madre tiene júnior infección, atul herpes genital, que podría transmitirse al bebé. ? La madre está embarazada con mellizos o más bebés. ? El bebé pesa de 9 a 8 libras o más. · Debido a los riesgos del parto por cesárea, las cesáreas planeadas deberían hacerse generalmente solo por motivos médicos. Y júnior cesárea planeada debería hacerse en la semana 44 o más tarde brendan que haya un motivo médico para hacerla antes. Sepa lo que ocurrirá después del parto y cómo planificar las primeras semanas en mims hogar  · Usted, mims bebé y mims stefano o instructor Cristino Gerber bandas de identificación. Solo las personas que tengan bandas que coincidan podrán retirar al bebé de la og de recién nacidos. · Aprenderá a alimentar a mims bebé, cambiar el pañal y bañar al bebé. Tremayne Gile a cuidar del muñón del cordón umbilical. Si [employer] a circuncidar a mims bebé, también aprenderá a cuidar júnior circuncisión. · Pídale a las visitas que esperen a visitarla cuando esté en mims hogar. Y pídales que se laven las ines antes de tocar al bebé. · Asegúrese de tener otro adulto en casa por lo menos 2 o 3 días después del Rafa. · Shaniqua las primeras 2 semanas, limite las visitas de familiares y amigos. · No permita visitantes que tengan resfriados o infecciones. Asegúrese de que todos los visitantes estén al día con david vacunas. Nunca deje que nadie fume cerca de mims bebé. · Trate de dormir júnior siesta cuando mims bebé duerma.   Sea consciente de la depresión posparto  · La \"melancolía de la maternidad\" es común en las primeras 1 o 2 semanas después del [place]. Es posible que tenga ganas de llorar o se sienta renu o irritable sin motivo alguno. · En algunas mujeres, estas emociones sena más tiempo y son más intensas. A esto se lo conoce atul depresión posparto. · Si david síntomas sena más de unas pocas semanas, o si se siente muy deprimida, pídale ayuda a mims médico.  · La depresión posparto sí puede tratarse. Los grupos de apoyo y la asesoría psicológica pueden ser de [facility]. A veces, los medicamentos también pueden ayudar. ¿Dónde puede encontrar más información en inglés? Vaya a [email or web address]/  Genevieve Espinoza B044 en la búsqueda para aprender más acerca de \"Semana 45 de mims embarazo: Instrucciones de cuidado. \"  Revisado: 16 junio, 2021               Versión del contenido: 13.2  © 6239-2246 Healthwise, Incorporated. Las instrucciones de cuidado fueron adaptadas bajo licencia por [employer] (which disclaims liability or warranty for this information). Si usted tiene Naylor Hope afección médica o sobre estas instrucciones, siempre pregunte a mims profesional de jong. [employer] [place] niega toda garantía o responsabilidad por mims uso de esta información.

## 2022-04-27 NOTE — PROGRESS NOTES
Tresa Smith is a 40 y.o. female    Chief Complaint   Patient presents with    Routine Prenatal Visit     38w 6d       LOF: no  Vaginal bleeding: on Saturday and yesterday, noticed some fluid with a little blood. Noticed when wiping  Fetal movement (after 20 weeks): yes, feels a lot of movement in her stomach  Contractions: when she stands for long periods of times  Dysuria: no  Headaches, blurred vision: no  Taking prenatal vitamins: yes    1. Have you been to the ER, urgent care clinic since your last visit? Hospitalized since your last visit? No    2. Have you seen or consulted any other health care providers outside of the [address] [place] [address] since your last visit? Include any pap smears or colon screening. No      Visit Vitals  /75 (BP 1 Location: Left upper arm, BP Patient Position: Sitting, BP Cuff Size: Adult)   Pulse 73   Temp 97.3 °F (36.3 °C) (Temporal)   Resp 18   Ht 5' 4\" (1.626 m)   Wt 144 lb 6.4 oz (65.5 kg)   SpO2 98%   BMI 24.79 kg/m²           Health Maintenance Due   Topic Date Due    Depression Screen  Never done    COVID-19 Vaccine (1) Never done         Medication Reconciliation completed, changes noted.   Please Update medication list.

## 2022-05-11 ENCOUNTER — ROUTINE PRENATAL (OUTPATIENT)
Dept: FAMILY MEDICINE CLINIC | Age: 37
End: 2022-05-11

## 2022-05-11 VITALS
SYSTOLIC BLOOD PRESSURE: 115 MMHG | DIASTOLIC BLOOD PRESSURE: 71 MMHG | BODY MASS INDEX: 21.68 KG/M2 | RESPIRATION RATE: 16 BRPM | HEART RATE: 60 BPM | OXYGEN SATURATION: 99 % | WEIGHT: 127 LBS | TEMPERATURE: 98 F | HEIGHT: 64 IN

## 2022-05-11 PROBLEM — Z34.90 PREGNANCY: Status: RESOLVED | Noted: 2018-05-02 | Resolved: 2022-05-11

## 2022-05-11 PROBLEM — O99.019 ANTEPARTUM ANEMIA: Status: RESOLVED | Noted: 2022-03-30 | Resolved: 2022-05-11

## 2022-05-11 PROBLEM — O34.219 VBAC, DELIVERED: Status: RESOLVED | Noted: 2018-06-05 | Resolved: 2022-05-11

## 2022-05-11 PROBLEM — Z98.891 HISTORY OF C-SECTION: Status: RESOLVED | Noted: 2018-06-05 | Resolved: 2022-05-11

## 2022-05-11 PROCEDURE — 0503F POSTPARTUM CARE VISIT: CPT | Performed by: STUDENT IN AN ORGANIZED HEALTH CARE EDUCATION/TRAINING PROGRAM

## 2022-05-11 NOTE — PATIENT INSTRUCTIONS
[facility] Residents only)  [address] [facility]  [phone]    [facility]  [address] [place] [address]  [phone]

## 2022-05-11 NOTE — PROGRESS NOTES
Naa Vazquez is a 40 y.o. female    Chief Complaint   Patient presents with   [facility]. She had a normal vaginal delivery. No other concerns. 1. Have you been to the ER, urgent care clinic since your last visit? Hospitalized since your last visit? No  2. Have you seen or consulted any other health care providers outside of the [address] [place] [address] since your last visit? Include any pap smears or colon screeningNo  Visit Vitals  /71 (BP 1 Location: Right upper arm, BP Patient Position: Sitting)   Pulse 60   Temp 98 °F (36.7 °C) (Oral)   Resp 16   Ht 5' 4\" (1.626 m)   Wt 127 lb (57.6 kg)   SpO2 99%   BMI 21.80 kg/m²           Health Maintenance Due   Topic Date Due    Depression Screen  Never done    COVID-19 Vaccine (1) Never done         Medication Reconciliation completed, changes noted.   Please  Update medication list.

## 2022-05-11 NOTE — PROGRESS NOTES
Postpartum Note    40 y.o. female  presented in term labor. Progressed to TSVD. Uncomplicated postpartum course. Discharged home in stable condition. Pt presenting for postpartum visit. Patient doing well post-partum without significant complaint. Baby followed by Dr. Vijay Smith. Lochia: Minimal  Pain: None  Baby: Doing well, has seen PCP  Sexual activity: No  Plan for contraception: Unsure  Symptoms of depression: No  Breast/bottle: Breast and formula  Support from FOB/family: Yes    Vitals:    Visit Vitals  /71 (BP 1 Location: Right upper arm, BP Patient Position: Sitting)   Pulse 60   Temp 98 °F (36.7 °C) (Oral)   Resp 16   Ht 5' 4\" (1.626 m)   Wt 127 lb (57.6 kg)   LMP 10/18/2021 (Exact Date)   SpO2 99%   BMI 21.80 kg/m²       Exam:  Patient without distress. Abdomen soft, fundus firm at level of umbilicus, nontender. Lower extremities:  No edema. No palpable cords or tenderness. Assessment and Plan:    Carrasco Bachelor is a 40 y.o. F4H0 s/p uncomplicated . Patient having uncomplicated post-partum course. 1. Continue routine care  2. [place] score: 2  3. Call clinic or make appointment for symptoms of sadness  4.  Follow up for yearly well woman exam.    5. Handout given for [id number] and [facility]                 Anuja Hirsch MD

## 2022-12-08 ENCOUNTER — OFFICE VISIT (OUTPATIENT)
Dept: FAMILY MEDICINE CLINIC | Age: 37
End: 2022-12-08
Payer: MEDICAID

## 2022-12-08 VITALS
TEMPERATURE: 98.4 F | HEART RATE: 88 BPM | HEIGHT: 64 IN | BODY MASS INDEX: 23.64 KG/M2 | RESPIRATION RATE: 18 BRPM | WEIGHT: 138.5 LBS | SYSTOLIC BLOOD PRESSURE: 132 MMHG | DIASTOLIC BLOOD PRESSURE: 66 MMHG | OXYGEN SATURATION: 96 %

## 2022-12-08 DIAGNOSIS — R30.0 DYSURIA: Primary | ICD-10-CM

## 2022-12-08 DIAGNOSIS — N92.6 MISSED MENSES: ICD-10-CM

## 2022-12-08 DIAGNOSIS — Z34.90 PREGNANCY, UNSPECIFIED GESTATIONAL AGE: ICD-10-CM

## 2022-12-08 LAB
BILIRUB UR QL STRIP: NEGATIVE
GLUCOSE UR-MCNC: NEGATIVE MG/DL
HCG URINE, QL. (POC): POSITIVE
KETONES P FAST UR STRIP-MCNC: NEGATIVE MG/DL
PH UR STRIP: 6 [PH] (ref 4.6–8)
PROT UR QL STRIP: NEGATIVE
SP GR UR STRIP: 1.03 (ref 1–1.03)
UA UROBILINOGEN AMB POC: NORMAL (ref 0.2–1)
URINALYSIS CLARITY POC: CLEAR
URINALYSIS COLOR POC: YELLOW
URINE BLOOD POC: NEGATIVE
URINE LEUKOCYTES POC: NEGATIVE
URINE NITRITES POC: NEGATIVE
VALID INTERNAL CONTROL?: YES

## 2022-12-08 NOTE — PROGRESS NOTES
Subjective  CC: Augusta Senior is an 40 y.o. female who presents for urinary frequency evaluation. Pt mentioned that she has been having urinary frequency since 10/2022. No dysuria or urinary urgency. Pt has a [de-identified] old baby at home and mentioned has been using monthly BC injections up until 10/2022 as she suspect she might be pregnant because her belly size has increased and feels like something is moving inside. Has not had abnormal vaginal bleeding. No vaginal discharge. No N/V. No breast pain. Has not been taking PNV. She is not BF as her milk supply stopped. Has not taken a home UPT       Allergies - reviewed:   No Known Allergies      Medications - reviewed:   Current Outpatient Medications   Medication Sig    prenatal vit-iron fumarate-fa (PRENATAL PLUS WITH IRON) 28 mg iron- 800 mcg tab Take 1 Tab by mouth daily. (Patient not taking: Reported on 2022)     No current facility-administered medications for this visit. Past Medical History - reviewed:  Past Medical History:   Diagnosis Date    Antepartum anemia 3/30/2022     delivery delivered     History of  2018    Pregnancy 2018     (vaginal birth after )     , delivered 2018    , delivered 2018         Immunizations - reviewed:   Immunization History   Administered Date(s) Administered    Influenza, FLUARIX, FLULAVAL, Caterina Media (age 10 mo+) AND AFLURIA, (age 1 y+), PF, 0.5mL 2015, 2022    Tdap 2018, 2022         ROS  Negative besides what is written in HPI. Physical Exam  Visit Vitals  /66 (BP 1 Location: Left upper arm, BP Patient Position: Sitting, BP Cuff Size: Adult)   Pulse 88   Temp 98.4 °F (36.9 °C) (Temporal)   Resp 18   Ht 5' 4\" (1.626 m)   Wt 138 lb 8 oz (62.8 kg)   SpO2 96%   BMI 23.77 kg/m²       General: No acute distress. Alert. Cooperative. Respiratory: CTAB. No w/r/r/c.  Cardiovascular: RRR. Normal S1,S2. No m/r/g. GI: gravid. Uterine fundus palpable at umbilicus level. Fetal movement noted. + bowel sounds. Nontender. No rebound tenderness or guarding. Extremities: No LE edema. Distal pulses present. Musculoskeletal: Full ROM in all extremities. Skin: Warm, dry. No rashes. Neuro: Alert and oriented. Assessment/Plan    Urinary frequency and positive UPT: Unclear LMP. Suspect at least ~ 20 weeks pregnancy.   - IOB labs ordered. - IOB visit scheduled for 22.  - Advised to take PNV  - Advised to start ASA 81 mg PO daily. - Will schedule MFM for US next visit. Referral placed. - Flu shot next visit. - Unclear if medicaid has .   - Reassurance given. Follow-up and Dispositions    Return in about 1 week (around 12/15/2022) for IOB . I have discussed the aforementioned diagnoses and plan with the patient in detail. I have provided information in person and/or in AVS. All questions answered prior to discharge.     Jimmy Saul MD  Family Medicine Resident

## 2022-12-08 NOTE — PROGRESS NOTES
Chief Complaint   Patient presents with    Follow-up     Patient feels that she needs to urinate, but only goes a little bit. It burns sometimes. It has been going on for 3 months. Visit Vitals  /66 (BP 1 Location: Left upper arm, BP Patient Position: Sitting, BP Cuff Size: Adult)   Pulse 88   Temp 98.4 °F (36.9 °C) (Temporal)   Resp 18   Ht 5' 4\" (1.626 m)   Wt 138 lb 8 oz (62.8 kg)   SpO2 96%   BMI 23.77 kg/m²     1. Have you been to the ER, urgent care clinic since your last visit? Hospitalized since your last visit? No    2. Have you seen or consulted any other health care providers outside of the [address] [place] [address] since your last visit? Include any pap smears or colon screening.  No

## 2022-12-10 LAB
BACTERIA UR CULT: NO GROWTH
C TRACH RRNA SPEC QL NAA+PROBE: NEGATIVE
N GONORRHOEA RRNA SPEC QL NAA+PROBE: NEGATIVE

## 2022-12-12 NOTE — PROGRESS NOTES
PNL: O positive, antibody neg, Hep C Ab/HIV/RPR NR, Hep B ag Neg, Hg Fractio wnl, Urine culture negative. Chlamydia/Gonorrhea neg. VZV/Rubella immune. H.4. I have scheduled Anatomy Scan: At [facility] on 12/15 at 11:15 am. Address: [address] [place] [address],  [address], [address] [place] [address] , [address] [place] [address]    I have tried to call pt three times and no answer. Will try later.

## 2022-12-19 ENCOUNTER — INITIAL PRENATAL (OUTPATIENT)
Dept: FAMILY MEDICINE CLINIC | Age: 37
End: 2022-12-19

## 2022-12-19 VITALS
OXYGEN SATURATION: 98 % | TEMPERATURE: 97.3 F | WEIGHT: 141 LBS | RESPIRATION RATE: 17 BRPM | DIASTOLIC BLOOD PRESSURE: 62 MMHG | BODY MASS INDEX: 24.07 KG/M2 | SYSTOLIC BLOOD PRESSURE: 110 MMHG | HEART RATE: 89 BPM | HEIGHT: 64 IN

## 2022-12-19 DIAGNOSIS — O09.90 SUPERVISION OF HIGH RISK PREGNANCY, ANTEPARTUM: ICD-10-CM

## 2022-12-19 DIAGNOSIS — R35.0 INCREASED URINARY FREQUENCY: ICD-10-CM

## 2022-12-19 DIAGNOSIS — Z23 ENCOUNTER FOR IMMUNIZATION: ICD-10-CM

## 2022-12-19 DIAGNOSIS — Z3A.28 28 WEEKS GESTATION OF PREGNANCY: Primary | ICD-10-CM

## 2022-12-19 LAB
APPEARANCE UR: CLEAR
BACTERIA URNS QL MICRO: NEGATIVE /HPF
BILIRUB UR QL: NEGATIVE
COLOR UR: ABNORMAL
EPITH CASTS URNS QL MICRO: ABNORMAL /LPF
GLUCOSE UR STRIP.AUTO-MCNC: NEGATIVE MG/DL
HGB UR QL STRIP: NEGATIVE
HYALINE CASTS URNS QL MICRO: ABNORMAL /LPF (ref 0–5)
KETONES UR QL STRIP.AUTO: NEGATIVE MG/DL
LEUKOCYTE ESTERASE UR QL STRIP.AUTO: ABNORMAL
NITRITE UR QL STRIP.AUTO: NEGATIVE
PH UR STRIP: 7 [PH] (ref 5–8)
PROT UR STRIP-MCNC: NEGATIVE MG/DL
RBC #/AREA URNS HPF: ABNORMAL /HPF (ref 0–5)
SP GR UR REFRACTOMETRY: 1.01 (ref 1–1.03)
UR CULT HOLD, URHOLD: NORMAL
UROBILINOGEN UR QL STRIP.AUTO: 0.2 EU/DL (ref 0.2–1)
WBC URNS QL MICRO: ABNORMAL /HPF (ref 0–4)

## 2022-12-19 NOTE — PROGRESS NOTES
I reviewed with the resident the medical history and the resident's findings on the physical examination. I discussed with the resident the patient's diagnosis and concur with the plan. Has been getting Depo shots since her last delivery, didn't know she was pregnant. Was ordering them from [place].   47yo  @ 28w6d by 28 wk scan   IUP: RH pos, NIPT collected today, MFM scan on Wed, GTT today   Hx CS: followed by   Brother with Tri21 and child with autism: NIPT collected today, carrier screening previously normal   Hx child with TAPVR  Late to care and poor dating criteria: 28 wk     Seen by [facility]  Referred to B

## 2022-12-19 NOTE — PROGRESS NOTES
Chief Complaint   Patient presents with    Initial Prenatal Visit     . No bleeding. +FM. 1. Have you been to the ER, urgent care clinic since your last visit? Hospitalized since your last visit? No    2. Have you seen or consulted any other health care providers outside of the [address] [place] [address] since your last visit? Include any pap smears or colon screening.  No

## 2022-12-19 NOTE — PROGRESS NOTES
Subjective:   Zia Soliz is a 40 y.o.   at 30w11d GA with MELL 3/7/23 based on early third trimester ultrasound, who is being seen today for her first initial obstetrical visit. Due to language barrier, an  was present during the history-taking and subsequent discussion (and for part of the physical exam) with this patient. This is not a planned pregnancy - pt was on home Depo shots she was ordering from [place] - didn't realize she had become pregnant  She is at 28 weeks 6 days gestation by early third trimester US. See flow sheet for OB history. History of Depression in pregnancy or post partum? No  History of GDM or DM? No  History of GHTN or HTN? No  History of pre-eclampsia? No  History of thyroid disorder? No  History of PTD? No  History of ? YES - in second pregnancy (fetal intolerance) - has had  since  History of Genetic disorders? YES - Autism in second child, younger brother with Down Syndrome  History of STD's? No  History of abnormal PAP? No  Taking prenatal vitamins? YES - NOT taking Aspirin (was advised to start)     Allergies- reviewed:   No Known Allergies    Medications- reviewed:   Current Outpatient Medications   Medication Sig    prenatal vit-iron fumarate-fa (PRENATAL PLUS WITH IRON) 28 mg iron- 800 mcg tab Take 1 Tablet by mouth daily. No current facility-administered medications for this visit.        Past Medical History- reviewed:  Past Medical History:   Diagnosis Date    Antepartum anemia 3/30/2022     delivery delivered     History of  2018    Pregnancy 2018     (vaginal birth after )     , delivered 2018    , delivered 2018       Past Surgical History- reviewed:   Past Surgical History:   Procedure Laterality Date    HX  SECTION         Social History- reviewed:  Social History     Socioeconomic History    Marital status:      Spouse name: Not on file    Number of children: Not on file    Years of education: Not on file    Highest education level: Not on file   Occupational History    Not on file   Tobacco Use    Smoking status: Never    Smokeless tobacco: Never   Substance and Sexual Activity    Alcohol use: No    Drug use: No    Sexual activity: Yes     Partners: Male   Other Topics Concern    Not on file   Social History Narrative    Not on file     Social Determinants of Health     Financial Resource Strain: Not on file   Food Insecurity: Not on file   Transportation Needs: Not on file   Physical Activity: Not on file   Stress: Not on file   Social Connections: Not on file   Intimate Partner Violence: Not on file   Housing Stability: Not on file         Objective:   Visit Vitals  /62   Pulse 89   Temp 97.3 °F (36.3 °C) (Temporal)   Resp 17   Ht 5' 4\" (1.626 m)   Wt 141 lb (64 kg)   LMP  (LMP Unknown)   SpO2 98%   BMI 24.20 kg/m²       See physical exam on flowsheet  Pelvic exam chaperoned by Huseyin Rosado LPN  SVE: closed/thick/high  Fundal height 27 cm, FHT present at 145 bpm    Labs:  No results found for this or any previous visit (from the past 12 hour(s)). Assessment and Plan:         ICD-10-CM ICD-9-CM    1. 28 weeks gestation of pregnancy  Z3A.28 V22.2 GLUCOSE, GESTATIONAL 1 HR TOLERANCE      GLUCOSE, GESTATIONAL 1 HR TOLERANCE      2. Encounter for immunization  Z23 V03.89 CANCELED: INFLUENZA, FLUARIX, FLULAVAL, FLUZONE (AGE 6 MO+), AFLURIA(AGE 3Y+) IM, PF, 0.5 ML      3. Supervision of high risk pregnancy, antepartum  O09.90 V23.9 REFERRAL TO MATERNAL FETAL MEDICINE      4. Increased urinary frequency  R35.0 788.41 URINALYSIS W/ RFLX MICROSCOPIC          40 y.o.  at 28w6d by 28-week U/S here for initial OB visit     PNL: O+, Ab neg, Hgb 11.4, Hgb frac wnl, HepC/HepB NR/neg, GC/CT neg, Rubella/VZV immune, Pap (2018) NILM, U/A bland, Ucx NG. NIPT and GTT collected today. Flu shot at next visit   - Hx CS: followed by . Anticipate .   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  - Late to care and poor dating criteria: scan at 28 wk. Follow up with MFM on 12/21.   - Pt is interested in sterilization following delivery: discussed need to transfer care if she desires immediate tubal ligation vs scheduling for later       Discussed recommended weight gain (prepreg BMI 19.8-26, 25-35lb)  Recommended prenatal vitamins daily; patient NOT taking daily ASA 81 (advised to start)    Recommend exercise in pregnancy at least 3 or more times weekly, mild to moderate intensity. Avoid activities that cause abdominal trauma. Discussed appropriate diet during pregnancy, foods to avoid and stressed importance of hydration   Discussed optional genetic screening (Vladimir Peoples): Yes - referral placed to [facility]  Request for MFM anatomy scan faxed: Yes - appt on 12/21  Dating ultrasound done today: Yes, best estimate dates patient at 28w6d  Follow up in 3 weeks with Dr. Tracee Knox This Encounter    GLUCOSE, GESTATIONAL 1 HR TOLERANCE     Standing Status:   Future     Number of Occurrences:   1     Standing Expiration Date:   12/19/2023    URINALYSIS W/ RFLX MICROSCOPIC     Standing Status:   Future     Number of Occurrences:   1     Standing Expiration Date:   6/19/2023    [facility]     Referral Priority:   Routine     Referral Type:   Consultation     Referral Reason:   Specialty Services Required     Referred to Provider:   Tiesha Brown MD     Requested Specialty:   Maternal Fetal Medicine Physician     Number of Visits Requested:   1         I have discussed the diagnosis with the patient and the intended plan as seen in the above orders. The patient has received an after-visit summary and questions were answered concerning future plans.   I have discussed medication side effects and warnings with the patient as well. Informed pt to return to the office or go to the ER if she experiences vaginal bleeding, vaginal discharge, leaking of fluid, pelvic cramping.       Pt seen and discussed with Dr. Colby Patiño (attending physician)    Sofía Moscoso MD  Family Medicine Resident

## 2022-12-20 DIAGNOSIS — R35.0 INCREASED URINARY FREQUENCY: Primary | ICD-10-CM

## 2022-12-20 DIAGNOSIS — R35.0 INCREASED URINARY FREQUENCY: ICD-10-CM

## 2022-12-20 LAB — GLUCOSE 1H P 100 G GLC PO SERPL-MCNC: 95 MG/DL (ref 65–140)

## 2022-12-21 ENCOUNTER — HOSPITAL ENCOUNTER (OUTPATIENT)
Dept: PERINATAL CARE | Age: 37
Discharge: HOME | End: 2022-12-21
Attending: OBSTETRICS & GYNECOLOGY

## 2022-12-21 LAB
BACTERIA SPEC CULT: NORMAL
SERVICE CMNT-IMP: NORMAL

## 2023-01-04 ENCOUNTER — DOCUMENTATION ONLY (OUTPATIENT)
Dept: FAMILY MEDICINE CLINIC | Age: 38
End: 2023-01-04

## 2023-01-05 ENCOUNTER — DOCUMENTATION ONLY (OUTPATIENT)
Dept: PERINATAL CARE | Age: 38
End: 2023-01-05

## 2023-01-05 NOTE — PROGRESS NOTES
Spoke to Tirso's with [facility] regarding patient referral for fetal echo, our referral reason was she had a history of a prior baby with CHD however per Cardiology the baby was completely fine after a repeat echo postnatally. Given this information, referral for fetal echo on this pregnancy cancelled with approval from Dr. Amelia Wood.

## 2023-01-09 ENCOUNTER — DOCUMENTATION ONLY (OUTPATIENT)
Dept: FAMILY MEDICINE CLINIC | Age: 38
End: 2023-01-09

## 2023-01-25 NOTE — PROGRESS NOTES
Chief Complaint   Patient presents with    Routine Prenatal Visit     Patient is 34 weeks ad 2 days. She is not having vaginal bleeding or discharge. She is taking her prenatal vitamins She is having fetal movement. No contractions. She states that she noticed she has urinary frequency since the beginning of her pregnancy. No other concerns.       47yo  at 34w2 by L/    IUP: RH pos  low risk NIPT  anatomy okay  GTT and CBC okay  Tdap today   Hx C/S: followed by  x3, planning for TOLAC  Brother with Tri21 and child with autism: NIPT low risk, carrier screening previously normal   AMA: on ASA   Hx child with TAPVR on Fetal Echo: on review of records by [facility] pediatric cardiology (see telephone note ) - that child had normal ECHO post-natally  no indication for fetal ECHO this pregnancy   Late to care and poor dating criteria: 28 wk, recommended f/up growth (no show )  rescheduled for   Urinary Frequency: prior urine culture NGTD     Seen by [facility], referred to [facility]  Has applied for Medicaid - has not heard anything yet   Estimated Date of Delivery: 3/7/23

## 2023-01-26 ENCOUNTER — ROUTINE PRENATAL (OUTPATIENT)
Dept: FAMILY MEDICINE CLINIC | Age: 38
End: 2023-01-26

## 2023-01-26 VITALS
DIASTOLIC BLOOD PRESSURE: 64 MMHG | OXYGEN SATURATION: 98 % | WEIGHT: 144 LBS | BODY MASS INDEX: 24.59 KG/M2 | HEIGHT: 64 IN | SYSTOLIC BLOOD PRESSURE: 115 MMHG | HEART RATE: 89 BPM | TEMPERATURE: 97.6 F | RESPIRATION RATE: 16 BRPM

## 2023-01-26 DIAGNOSIS — Z98.891 HISTORY OF C-SECTION: ICD-10-CM

## 2023-01-26 DIAGNOSIS — Z82.79 FAMILY HISTORY OF CONGENITAL HEART DEFECT: ICD-10-CM

## 2023-01-26 DIAGNOSIS — O09.90 SUPERVISION OF HIGH RISK PREGNANCY, ANTEPARTUM: Primary | ICD-10-CM

## 2023-01-26 NOTE — PROGRESS NOTES
Laura Gomez is a 41 y.o. female    Chief Complaint   Patient presents with    Routine Prenatal Visit     Patient is 34 weeks ad 2 days. She is not having vaginal bleeding or discharge. She is taking her prenatal vitamins She is having fetal movement. No contractions. She states that she noticed she has urinary frequency since the beginning of her pregnancy. No other concerns. 1. Have you been to the ER, urgent care clinic since your last visit? Hospitalized since your last visit? No    2. Have you seen or consulted any other health care providers outside of the [address] [place] [address] since your last visit? Include any pap smears or colon screening. No      Visit Vitals  /64 (BP 1 Location: Right upper arm, BP Patient Position: Sitting)   Pulse 89   Temp 97.6 °F (36.4 °C) (Oral)   Resp 16   Ht 5' 4\" (1.626 m)   Wt 144 lb (65.3 kg)   SpO2 98%   BMI 24.72 kg/m²           Health Maintenance Due   Topic Date Due    COVID-19 Vaccine (1) Never done    Flu Vaccine (1) 08/01/2022    OB 3RD TRIMESTER TDAP  12/06/2022         Medication Reconciliation completed, changes noted.   Please  Update medication list.

## 2023-01-30 ENCOUNTER — HOSPITAL ENCOUNTER (OUTPATIENT)
Dept: PERINATAL CARE | Age: 38
Discharge: HOME | End: 2023-01-30
Attending: OBSTETRICS & GYNECOLOGY
Payer: SELF-PAY

## 2023-01-30 PROCEDURE — 76816 OB US FOLLOW-UP PER FETUS: CPT | Performed by: OBSTETRICS & GYNECOLOGY

## 2023-02-08 ENCOUNTER — ROUTINE PRENATAL (OUTPATIENT)
Dept: FAMILY MEDICINE CLINIC | Age: 38
End: 2023-02-08

## 2023-02-08 VITALS
DIASTOLIC BLOOD PRESSURE: 73 MMHG | RESPIRATION RATE: 16 BRPM | WEIGHT: 147 LBS | HEIGHT: 64 IN | TEMPERATURE: 97.6 F | HEART RATE: 81 BPM | BODY MASS INDEX: 25.1 KG/M2 | SYSTOLIC BLOOD PRESSURE: 115 MMHG | OXYGEN SATURATION: 99 %

## 2023-02-08 DIAGNOSIS — Z98.891 HISTORY OF C-SECTION: ICD-10-CM

## 2023-02-08 DIAGNOSIS — O09.90 SUPERVISION OF HIGH RISK PREGNANCY, ANTEPARTUM: Primary | ICD-10-CM

## 2023-02-08 PROCEDURE — 0502F SUBSEQUENT PRENATAL CARE: CPT | Performed by: FAMILY MEDICINE

## 2023-02-08 NOTE — PROGRESS NOTES
Baby moving     45yo  at 36w1d by L/28    IUP: RH pos  low risk NIPT  anatomy okay  GTT and CBC okay  s/p Tdap, GBS collected   Hx C/S: followed by  x3, planning for TOLAC  Brother with Tri21 and child with autism: NIPT low risk, carrier screening previously normal   AMA: on ASA   Reported hx child with TAPVR on Fetal Echo: on review of records by [facility] pediatric cardiology (see telephone note ) - that child had normal ECHO post-natally  no indication for fetal ECHO this pregnancy   Late to care and poor dating criteria: 28 wk,  EFW 66th%  Urinary Frequency: prior urine culture NGTD     Seen by [facility], referred to [facility]  Has applied for Medicaid - has not heard anything yet   Estimated Date of Delivery: 3/7/23

## 2023-02-08 NOTE — PROGRESS NOTES
Nazanin Vasquez is a 41 y.o. female    Chief Complaint   Patient presents with    Routine Prenatal Visit     Patient is 36 weeks and 1 day. She I snot having vaginal bleeding or discharge. She is taking her prenatal vitamins. She is having fetal movement. No contractions. No other concerns. 1. Have you been to the ER, urgent care clinic since your last visit? Hospitalized since your last visit? No    2. Have you seen or consulted any other health care providers outside of the [address] [place] [address] since your last visit? Include any pap smears or colon screening. No      Visit Vitals  /73 (BP 1 Location: Right upper arm, BP Patient Position: Sitting)   Pulse 81   Temp 97.6 °F (36.4 °C) (Oral)   Resp 16   Ht 5' 4\" (1.626 m)   Wt 147 lb (66.7 kg)   SpO2 99%   BMI 25.23 kg/m²           Health Maintenance Due   Topic Date Due    COVID-19 Vaccine (1) Never done    Flu Vaccine (1) 08/01/2022         Medication Reconciliation completed, changes noted.   Please  Update medication list.

## 2023-02-12 LAB
BACTERIA SPEC CULT: NORMAL
SERVICE CMNT-IMP: NORMAL

## 2023-02-15 ENCOUNTER — TELEPHONE (OUTPATIENT)
Dept: FAMILY MEDICINE CLINIC | Age: 38
End: 2023-02-15

## 2023-02-15 NOTE — TELEPHONE ENCOUNTER
I called the patient today, but she did not answer. I left a voicemail with all the details for next appointment.

## 2023-02-15 NOTE — TELEPHONE ENCOUNTER
----- Message from Tyler Lopez DO sent at 2/15/2023 12:04 PM EST -----  Regarding: OB Appt Missed  Miquel Prado Gaines,    Patient missed OB appt today. Looks like she has another scheduled next week. Would you mind calling to make sure she knows about next weeks appt?     Thanks,  Betzy

## 2023-02-23 ENCOUNTER — ROUTINE PRENATAL (OUTPATIENT)
Dept: FAMILY MEDICINE CLINIC | Age: 38
End: 2023-02-23

## 2023-02-23 VITALS
RESPIRATION RATE: 16 BRPM | HEIGHT: 64 IN | BODY MASS INDEX: 25.1 KG/M2 | WEIGHT: 147 LBS | SYSTOLIC BLOOD PRESSURE: 116 MMHG | HEART RATE: 88 BPM | DIASTOLIC BLOOD PRESSURE: 64 MMHG | TEMPERATURE: 97.8 F | OXYGEN SATURATION: 98 %

## 2023-02-23 DIAGNOSIS — Z98.891 HISTORY OF C-SECTION: ICD-10-CM

## 2023-02-23 DIAGNOSIS — O09.90 SUPERVISION OF HIGH RISK PREGNANCY, ANTEPARTUM: Primary | ICD-10-CM

## 2023-02-23 PROCEDURE — 0502F SUBSEQUENT PRENATAL CARE: CPT | Performed by: FAMILY MEDICINE

## 2023-02-23 NOTE — PROGRESS NOTES
Chief Complaint   Patient presents with    Routine Prenatal Visit     Patient is 38 weeks and 2 days. She is not having vaginal bleeding or discharge. She is having fetal movement. She is taking her prenatal vitamins. No contractions. No other concerns.       45yo  at 38w2d by L/28    IUP: RH pos  low risk NIPT  anatomy okay  GTT and CBC okay  s/p Tdap, GBS neg  EFW 66%, AC 32% at 44T0  cephalic   Hx C/S: followed by  x3, planning for TOLAC  Brother with Tri21 and child with autism: NIPT low risk, carrier screening previously normal   AMA: on ASA   Reported hx child with TAPVR on Fetal Echo: on review of records by [facility] pediatric cardiology (see telephone note ) - that child had normal ECHO post-natally  no indication for fetal ECHO this pregnancy   Late to care and poor dating criteria: 28 wk, f/up growth consistent EFW 66th%  Urinary Frequency: prior urine culture NGTD     Labor: Never needed to be induced, all spontaneous labors with    Feeding: both formula and breastmilk  Family Planning: BTL - would like to go to [facility]  Peds: [facility]   Seen by [facility], referred to [facility]  Has applied for Medicaid - has not heard anything yet   Estimated Date of Delivery: 3/7/23

## 2023-02-23 NOTE — PROGRESS NOTES
Jamir Nino is a 41 y.o. female    Chief Complaint   Patient presents with    Routine Prenatal Visit     Patient is 38 weeks and 2 days. She is not having vaginal bleeding or discharge. She is having fetal movement. She is taking her prenatal vitamins. No contractions. She mentioned not sleeping well because the baby moves a lot. No other concerns. 1. Have you been to the ER, urgent care clinic since your last visit? Hospitalized since your last visit? No    2. Have you seen or consulted any other health care providers outside of the [address] [place] [address] since your last visit? Include any pap smears or colon screening. No      Visit Vitals  /64 (BP 1 Location: Right upper arm, BP Patient Position: Sitting)   Pulse 88   Temp 97.8 °F (36.6 °C) (Oral)   Resp 16   Ht 5' 4\" (1.626 m)   Wt 147 lb (66.7 kg)   SpO2 98%   BMI 25.23 kg/m²           Health Maintenance Due   Topic Date Due    COVID-19 Vaccine (1) Never done    Flu Vaccine (1) 08/01/2022         Medication Reconciliation completed, changes noted.   Please  Update medication list.

## 2023-02-24 ENCOUNTER — HOSPITAL ENCOUNTER (INPATIENT)
Age: 38
LOS: 1 days | Discharge: HOME | End: 2023-02-26
Attending: FAMILY MEDICINE | Admitting: OBSTETRICS & GYNECOLOGY
Payer: SELF-PAY

## 2023-02-24 PROCEDURE — 75810000275 HC EMERGENCY DEPT VISIT NO LEVEL OF CARE

## 2023-02-25 ENCOUNTER — ANESTHESIA (OUTPATIENT)
Dept: LABOR AND DELIVERY | Age: 38
End: 2023-02-25
Payer: SELF-PAY

## 2023-02-25 ENCOUNTER — ANESTHESIA EVENT (OUTPATIENT)
Dept: LABOR AND DELIVERY | Age: 38
End: 2023-02-25
Payer: SELF-PAY

## 2023-02-25 PROBLEM — Z34.90 PREGNANCY: Status: ACTIVE | Noted: 2023-02-25

## 2023-02-25 LAB
BASOPHILS # BLD: 0 K/UL (ref 0–0.1)
BASOPHILS NFR BLD: 0 % (ref 0–1)
DIFFERENTIAL METHOD BLD: ABNORMAL
EOSINOPHIL # BLD: 0 K/UL (ref 0–0.4)
EOSINOPHIL NFR BLD: 0 % (ref 0–7)
ERYTHROCYTE [DISTWIDTH] IN BLOOD BY AUTOMATED COUNT: 12.5 % (ref 11.5–14.5)
HCT VFR BLD AUTO: 34.8 % (ref 35–47)
HGB BLD-MCNC: 11.4 G/DL (ref 11.5–16)
IMM GRANULOCYTES # BLD AUTO: 0.1 K/UL (ref 0–0.04)
IMM GRANULOCYTES NFR BLD AUTO: 1 % (ref 0–0.5)
LYMPHOCYTES # BLD: 1.2 K/UL (ref 0.8–3.5)
LYMPHOCYTES NFR BLD: 11 % (ref 12–49)
MCH RBC QN AUTO: 28.1 PG (ref 26–34)
MCHC RBC AUTO-ENTMCNC: 32.8 G/DL (ref 30–36.5)
MCV RBC AUTO: 85.9 FL (ref 80–99)
MONOCYTES # BLD: 0.6 K/UL (ref 0–1)
MONOCYTES NFR BLD: 5 % (ref 5–13)
NEUTS SEG # BLD: 9.3 K/UL (ref 1.8–8)
NEUTS SEG NFR BLD: 83 % (ref 32–75)
NRBC # BLD: 0 K/UL (ref 0–0.01)
NRBC BLD-RTO: 0 PER 100 WBC
PLATELET # BLD AUTO: 240 K/UL (ref 150–400)
PMV BLD AUTO: 10.7 FL (ref 8.9–12.9)
RBC # BLD AUTO: 4.05 M/UL (ref 3.8–5.2)
WBC # BLD AUTO: 11.3 K/UL (ref 3.6–11)

## 2023-02-25 PROCEDURE — 77030014125 HC TY EPDRL BBMI -B: Performed by: ANESTHESIOLOGY

## 2023-02-25 PROCEDURE — 36415 COLL VENOUS BLD VENIPUNCTURE: CPT

## 2023-02-25 PROCEDURE — 00HU33Z INSERTION OF INFUSION DEVICE INTO SPINAL CANAL, PERCUTANEOUS APPROACH: ICD-10-PCS

## 2023-02-25 PROCEDURE — 75410000002 HC LABOR FEE PER 1 HR

## 2023-02-25 PROCEDURE — 74011250637 HC RX REV CODE- 250/637

## 2023-02-25 PROCEDURE — 74011000250 HC RX REV CODE- 250: Performed by: ANESTHESIOLOGY

## 2023-02-25 PROCEDURE — 76815 OB US LIMITED FETUS(S): CPT

## 2023-02-25 PROCEDURE — 74011250636 HC RX REV CODE- 250/636: Performed by: FAMILY MEDICINE

## 2023-02-25 PROCEDURE — 65270000029 HC RM PRIVATE

## 2023-02-25 PROCEDURE — 74011250636 HC RX REV CODE- 250/636: Performed by: ADVANCED PRACTICE MIDWIFE

## 2023-02-25 PROCEDURE — 75410000003 HC RECOV DEL/VAG/CSECN EA 0.5 HR

## 2023-02-25 PROCEDURE — 85025 COMPLETE CBC W/AUTO DIFF WBC: CPT

## 2023-02-25 PROCEDURE — 74011250636 HC RX REV CODE- 250/636

## 2023-02-25 PROCEDURE — 75410000000 HC DELIVERY VAGINAL/SINGLE

## 2023-02-25 PROCEDURE — 76060000078 HC EPIDURAL ANESTHESIA: Performed by: ANESTHESIOLOGY

## 2023-02-25 PROCEDURE — 4A1HXCZ MONITORING OF PRODUCTS OF CONCEPTION, CARDIAC RATE, EXTERNAL APPROACH: ICD-10-PCS

## 2023-02-25 PROCEDURE — 74011250637 HC RX REV CODE- 250/637: Performed by: ADVANCED PRACTICE MIDWIFE

## 2023-02-25 PROCEDURE — 99284 EMERGENCY DEPT VISIT MOD MDM: CPT

## 2023-02-25 PROCEDURE — 59025 FETAL NON-STRESS TEST: CPT

## 2023-02-25 RX ORDER — OXYTOCIN/RINGER'S LACTATE 30/500 ML
87.3 PLASTIC BAG, INJECTION (ML) INTRAVENOUS AS NEEDED
Status: DISCONTINUED | OUTPATIENT
Start: 2023-02-25 | End: 2023-02-26 | Stop reason: HOSPADM

## 2023-02-25 RX ORDER — IBUPROFEN 800 MG/1
800 TABLET ORAL EVERY 8 HOURS
Status: DISCONTINUED | OUTPATIENT
Start: 2023-02-25 | End: 2023-02-25

## 2023-02-25 RX ORDER — OXYTOCIN/RINGER'S LACTATE 30/500 ML
10 PLASTIC BAG, INJECTION (ML) INTRAVENOUS AS NEEDED
Status: DISCONTINUED | OUTPATIENT
Start: 2023-02-25 | End: 2023-02-26 | Stop reason: HOSPADM

## 2023-02-25 RX ORDER — IBUPROFEN 800 MG/1
800 TABLET ORAL EVERY 8 HOURS
Status: DISCONTINUED | OUTPATIENT
Start: 2023-02-25 | End: 2023-02-26 | Stop reason: HOSPADM

## 2023-02-25 RX ORDER — FENTANYL/BUPIVACAINE/NS/PF 2-1250MCG
1-16 SYRINGE (ML) EPIDURAL CONTINUOUS
Status: DISCONTINUED | OUTPATIENT
Start: 2023-02-25 | End: 2023-02-25 | Stop reason: HOSPADM

## 2023-02-25 RX ORDER — LIDOCAINE HYDROCHLORIDE 10 MG/ML
INJECTION INFILTRATION; PERINEURAL AS NEEDED
Status: DISCONTINUED | OUTPATIENT
Start: 2023-02-25 | End: 2023-02-25 | Stop reason: HOSPADM

## 2023-02-25 RX ORDER — OXYTOCIN/RINGER'S LACTATE 30/500 ML
87.3 PLASTIC BAG, INJECTION (ML) INTRAVENOUS AS NEEDED
Status: COMPLETED | OUTPATIENT
Start: 2023-02-25 | End: 2023-02-25

## 2023-02-25 RX ORDER — SODIUM CHLORIDE 0.9 % (FLUSH) 0.9 %
5-40 SYRINGE (ML) INJECTION EVERY 8 HOURS
Status: DISCONTINUED | OUTPATIENT
Start: 2023-02-25 | End: 2023-02-25

## 2023-02-25 RX ORDER — FOLIC ACID/MULTIVIT,IRON,MINER 0.4MG-18MG
1 TABLET ORAL DAILY
Status: DISCONTINUED | OUTPATIENT
Start: 2023-02-25 | End: 2023-02-26 | Stop reason: HOSPADM

## 2023-02-25 RX ORDER — OXYTOCIN/RINGER'S LACTATE 30/500 ML
PLASTIC BAG, INJECTION (ML) INTRAVENOUS
Status: COMPLETED
Start: 2023-02-25 | End: 2023-02-25

## 2023-02-25 RX ORDER — ACETAMINOPHEN 325 MG/1
650 TABLET ORAL
Status: DISCONTINUED | OUTPATIENT
Start: 2023-02-25 | End: 2023-02-26 | Stop reason: HOSPADM

## 2023-02-25 RX ORDER — NALOXONE HYDROCHLORIDE 0.4 MG/ML
0.4 INJECTION, SOLUTION INTRAMUSCULAR; INTRAVENOUS; SUBCUTANEOUS AS NEEDED
Status: DISCONTINUED | OUTPATIENT
Start: 2023-02-25 | End: 2023-02-25 | Stop reason: HOSPADM

## 2023-02-25 RX ORDER — BUPIVACAINE HYDROCHLORIDE 2.5 MG/ML
INJECTION, SOLUTION EPIDURAL; INFILTRATION; INTRACAUDAL
Status: SHIPPED | OUTPATIENT
Start: 2023-02-25 | End: 2023-02-25

## 2023-02-25 RX ORDER — NALOXONE HYDROCHLORIDE 0.4 MG/ML
0.4 INJECTION, SOLUTION INTRAMUSCULAR; INTRAVENOUS; SUBCUTANEOUS AS NEEDED
Status: DISCONTINUED | OUTPATIENT
Start: 2023-02-25 | End: 2023-02-26 | Stop reason: HOSPADM

## 2023-02-25 RX ORDER — SODIUM CHLORIDE 0.9 % (FLUSH) 0.9 %
5-40 SYRINGE (ML) INJECTION AS NEEDED
Status: DISCONTINUED | OUTPATIENT
Start: 2023-02-25 | End: 2023-02-26 | Stop reason: HOSPADM

## 2023-02-25 RX ORDER — LIDOCAINE HYDROCHLORIDE AND EPINEPHRINE 15; 5 MG/ML; UG/ML
INJECTION, SOLUTION EPIDURAL
Status: SHIPPED | OUTPATIENT
Start: 2023-02-25 | End: 2023-02-25

## 2023-02-25 RX ORDER — EPHEDRINE SULFATE/0.9% NACL/PF 50 MG/5 ML
10 SYRINGE (ML) INTRAVENOUS ONCE
Status: DISPENSED | OUTPATIENT
Start: 2023-02-25 | End: 2023-02-25

## 2023-02-25 RX ORDER — SODIUM CHLORIDE, SODIUM LACTATE, POTASSIUM CHLORIDE, CALCIUM CHLORIDE 600; 310; 30; 20 MG/100ML; MG/100ML; MG/100ML; MG/100ML
125 INJECTION, SOLUTION INTRAVENOUS CONTINUOUS
Status: DISCONTINUED | OUTPATIENT
Start: 2023-02-25 | End: 2023-02-26 | Stop reason: HOSPADM

## 2023-02-25 RX ADMIN — ACETAMINOPHEN 650 MG: 325 TABLET ORAL at 23:40

## 2023-02-25 RX ADMIN — LIDOCAINE HYDROCHLORIDE AND EPINEPHRINE 3.5 ML: 15; 5 INJECTION, SOLUTION EPIDURAL at 01:55

## 2023-02-25 RX ADMIN — Medication 87.3 MILLI-UNITS/MIN: at 04:38

## 2023-02-25 RX ADMIN — SODIUM CHLORIDE, POTASSIUM CHLORIDE, SODIUM LACTATE AND CALCIUM CHLORIDE 500 ML: 600; 310; 30; 20 INJECTION, SOLUTION INTRAVENOUS at 00:23

## 2023-02-25 RX ADMIN — OXYTOCIN 87.3 MILLI-UNITS/MIN: 10 INJECTION INTRAVENOUS at 04:38

## 2023-02-25 RX ADMIN — BUPIVACAINE HYDROCHLORIDE 10 ML: 2.5 INJECTION, SOLUTION EPIDURAL; INFILTRATION; INTRACAUDAL; PERINEURAL at 02:01

## 2023-02-25 RX ADMIN — ACETAMINOPHEN 650 MG: 325 TABLET ORAL at 08:32

## 2023-02-25 RX ADMIN — LIDOCAINE HYDROCHLORIDE 1.5 MG: 10 INJECTION, SOLUTION INFILTRATION; PERINEURAL at 01:52

## 2023-02-25 RX ADMIN — IBUPROFEN 800 MG: 800 TABLET, FILM COATED ORAL at 12:42

## 2023-02-25 RX ADMIN — Medication 12 ML/HR: at 03:04

## 2023-02-25 RX ADMIN — IBUPROFEN 800 MG: 800 TABLET, FILM COATED ORAL at 19:52

## 2023-02-25 RX ADMIN — SODIUM CHLORIDE, POTASSIUM CHLORIDE, SODIUM LACTATE AND CALCIUM CHLORIDE 125 ML/HR: 600; 310; 30; 20 INJECTION, SOLUTION INTRAVENOUS at 02:08

## 2023-02-25 RX ADMIN — Medication 1 TABLET: at 08:32

## 2023-02-25 RX ADMIN — IBUPROFEN 800 MG: 800 TABLET, FILM COATED ORAL at 05:32

## 2023-02-25 RX ADMIN — ACETAMINOPHEN 650 MG: 325 TABLET ORAL at 18:42

## 2023-02-25 NOTE — ROUTINE PROCESS
SBAR OUT Report: Mother    Verbal report given to MARVA Kumar RN (full name & credentials) on this patient, who is now being transferred to MIU (unit) for routine progression of care. Report consisted of patient's Situation, Background, Assessment and Recommendations (SBAR).  ID bands were compared with the identification form, and verified with the patient and receiving nurse. Information from the SBAR, Kardex, Intake/Output, and MAR and the [place] Report was reviewed with the receiving nurse; opportunity for questions and clarification provided.

## 2023-02-25 NOTE — PROGRESS NOTES
2358: Patient being brought onto the unit at this time via a stretcher and EMS. This RN accompanying them and patient to room at this time. 0120: This RN calling [facility] at this time. 0121: This RN calling resident at this time. 0424: RN remained at bedside throughout pushing. EFM continuously assessed. Vaginal delivery of viable infant. 0400: This RN calling TAMIA Abbasi about strip at this time. Plan to have Mehran Sanders come and perform SVE.     0405: Ayleen at bedside to assess patient. 0408: SVE by BETITO Sanders at this time. Per CNM- patient is 10/100/+2. Plan to do a test push and then begin shortly. 0411: Patient actively pushing. RN remains in continuous attendance at the bedside. Assessment & evaluation of fetal heart rate ongoing via continuous EFM.     0424: RN remained at bedside throughout pushing. EFM continuously assessed. Vaginal delivery of viable infant. 0700: Bedside and Verbal shift change report given to BETITO Longoria (oncoming nurse) by Bell Persaud RN (offgoing nurse). Report included the following information SBAR, Kardex, Procedure Summary, Intake/Output, MAR, Accordion, Recent Results, Med Rec Status, Alarm Parameters , Procedure Verification, and Quality Measures.

## 2023-02-25 NOTE — ANESTHESIA PROCEDURE NOTES
Epidural Block    Patient location during procedure: OB  Start time: 2/25/2023 1:52 AM  End time: 2/25/2023 2:01 AM  Reason for block: labor epidural  Staffing  Performed: attending   Anesthesiologist: Charley Mcclain MD  Preanesthetic Checklist  Completed: patient identified, IV checked, site marked, risks and benefits discussed, surgical consent, monitors and equipment checked, pre-op evaluation, timeout performed and fire risk safety assessment completed and verbalized  Block Placement  Patient position: sitting  Prep: ChloraPrep  Sterility prep: mask, gloves, drape and cap  Sedation level: no sedation  Patient monitoring: heart rate, frequent blood pressure checks and continuous pulse oximetry  Approach: midline  Location: lumbar  Lumbar location: L2-L3  Epidural  Loss of resistance technique: saline and hanging drop  Guidance: landmark technique  Needle  Needle type: Tuohy   Needle gauge: 17 G  Needle length: 10 cm  Needle insertion depth: 7 cm  Catheter type: end hole  Catheter size: 20 G  Catheter at skin depth: 12 cm  Catheter securement method: surgical tape and clear occlusive dressing  Test dose: negative  Medications Administered  lidocaine-EPINEPHrine (XYLOCAINE) 1.5 %-1:200,000 Epidural - Epidural   3.5 mL - 2/25/2023 1:55:00 AM  bupivacaine (PF) (MARCAINE) 0.25% Epidural - Epidural   10 mL - 2/25/2023 2:01:00 AM  Assessment  Number of attempts: 1  Procedure assessment: patient tolerated procedure well with no immediate complications  Additional Notes  Aseptic, atraumatic technique

## 2023-02-25 NOTE — ROUTINE PROCESS
SBAR IN Report: Mother    Ver[facility], RN (full name & credentials) on this patient, who is now being transferred from  and D (unit) for routine progression of care. The patient is not wearing a green \"Anesthesia-Duramorph\" band. Report consisted of patient's Situation, Background, Assessment and Recommendations (SBAR). [place] ID bands were compared with the identification form, and verified with the patient and transferring nurse. Information from the SBAR, Kardex, Intake/Output, and MAR and the [place] Report was reviewed with the transferring nurse; opportunity for questions and clarification provided.

## 2023-02-25 NOTE — LACTATION NOTE
This note was copied from a baby's chart. Experienced breastfeeding mother. She plans on doing blended feedings with formula. Instructed mother to offer baby her breast milk first. Baby was latched on well and breast fed well during visit. Mother has a breast pump for home use. Discussed with mother her plan for feeding. Reviewed the benefits of exclusive breast milk feeding during the hospital stay. Informed her of the risks of using formula to supplement in the first few days of life as well as the benefits of successful breast milk feeding; referred her to the Breastfeeding booklet about this information. She acknowledges understanding of information reviewed and states that it is her plan to breast /formula feed her infant. Will support her choice and offer additional information as needed. Encouraged mom to attempt feeding with baby led feeding cues. Just as sucking on fingers, rooting, mouthing. Looking for 8-12 feedings in 24 hours. Don't limit baby at breast, allow baby to come of breast on it's own. Baby may want to feed  often and may increase number of feedings on second day of life. Skin to skin encouraged. If baby doesn't nurse,  Mom should  hand express  10-20 drops of colostrum and drip into baby's mouth, or give to baby by finger feeding, cup feeding, or spoon feeding at least every 2-3 hours. Reviewed breastfeeding basics:  Supply and demand,  stomach size, early  Feeding cues, skin to skin, positioning and baby led latch-on, assymetrical latch with signs of good, deep latch vs shallow, feeding frequency and duration, and log sheet for tracking infant feedings and output. Breastfeeding Booklet and Warm line information given. Discussed typical  weight loss and the importance of infant weight checks with pediatrician 1-2 post discharge.       Care for sore/tender nipples discussed:  ways to improve positioning and latch practiced and discussed, hand express colostrum after feedings and let air dry, light application of lanolin, start feedings on least sore side first.     Engorgement Care Guidelines:  Reviewed how milk is made and normal phases of milk production. Taught care of engorged breasts - physiologic breastfeeding encouraged with use of cool packs (no ice directly on skin). Consider use of NSAIDS where appropriate for discomfort and inflammation. Can employ light touch, lymphatic drainage techniques on tender grandular tissues. Anticipatory guidance shared. Discussed eating a healthy diet. Instructed mother to eat a variety of foods in order to get a well balanced diet. She should consume an extra 500 calories per day (more than her non-pregnant requirement.) These extra calories will help provide energy needed for optimal breast milk production. Mother also encouraged to \"drink to thirst\" and it is recommended that she drink fluids such as water, fruit/vegetable juice. Nutritious snacks should be available so that she can eat throughout the day to help satisfy her hunger and maintain a good milk supply. Mother will successfully establish breastfeeding by feeding in response to early feeding cues   or wake every 3h, will obtain deep latch, and will keep log of feedings/output. Taught to BF at hunger cues and or q 2-3 hrs and to offer 10-20 drops of hand expressed colostrum at any non-feeds. Breast Assessment  Left Breast: Medium  Left Nipple: Everted, Intact  Right Breast: Medium  Right Nipple: Everted, Intact  Breast- Feeding Assessment  Attends Breast-Feeding Classes: No  Breast-Feeding Experience: Yes (This is mother's 6th baby to breastfeed.  She did blended feedings with her other babies and gave breast milk for up to 6 months.)  Breast Trauma/Surgery: No  Type/Quality: Good  Lactation Consultant Visits  Breast-Feedings: Good   Mother/Infant Observation  Mother Observation: Alignment, Holds breast, Breast comfortable, Lets baby end feeding, Close hold, Nipple round on release, Recognizes feeding cues  Infant Observation: Audible swallows, Lips flanged, upper, Opens mouth, Breast tissue moves, Rhythmic suck, Latches nipple and aereolae, Lips flanged, lower  LATCH Documentation  Latch: Grasps breast, tongue down, lips flanged, rhythmic sucking  Audible Swallowing: A few with stimulation  Type of Nipple: Everted (after stimulation)  Comfort (Breast/Nipple): Soft/non-tender  Hold (Positioning): No assist from staff, mother able to position/hold infant  LATCH Score: 9      Breastfeeding handouts and LC# given in [facility]

## 2023-02-25 NOTE — H&P
[facility] [address] [place] [address]   Office [phone], Fax [phone]      History & Physical    Name: Luci Sweeney MRN: [id number]  SSN: [id number]    YOB: 1985  Age: 41 y.o. Sex: female      Subjective:     Reason for Admission:  Pregnancy and Contractions    History of Present Illness: Ms. Angel Holt is a 41 y.o.  female with an estimated gestational age of 38w3d with Estimated Date of Delivery: 3/7/23. Patient complains of contractions for five hours, she had a gush of fluid from her vagina right before the contractions started. Pregnancy has been complicated by advance maternal age, late to care (presented at 28wk), urinary frequency w/ urine culture NGTD. Denies vaginal bleeding. Endorses adequate fetal movement. Patient denies fever/chills, HA, dizziness, vision changes, n/v/d, CP, SOB, dysuria, worsening edema. OB History    Para Term  AB Living   6 5 5     5   SAB IAB Ectopic Molar Multiple Live Births             5      # Outcome Date GA Lbr Yvon/2nd Weight Sex Delivery Anes PTL Lv   6 Current            5 Term 22 39w0d 00:26 / 00:22 7 lb 1.9 oz (3.23 kg) F Vag-Spont None N RAFAEL   4 Term 18 39w0d  7 lb 12 oz (3.515 kg) M Vag-Spont  N RAFAEL   3 Term  40w0d  7 lb 10 oz (3.459 kg) M    RAFAEL   2 Term  41w0d  7 lb 10 oz (3.459 kg) M CS-LTranv   RAFAEL      Complications: Other (comment)   1 Term  40w0d  7 lb 4 oz (3.289 kg) M Vag-Spont   RAFAEL      Obstetric Comments   LTCS at [facility] due to Fetal bradycardia.  Baby was later diagnosed with Autism     Past Medical History:   Diagnosis Date    Antepartum anemia 3/30/2022     delivery delivered     History of  2018    Pregnancy 2018     (vaginal birth after )     , delivered 2018    , delivered 2018     Past Surgical History:   Procedure Laterality Date    HX  SECTION       Social History     Occupational History Not on file   Tobacco Use    Smoking status: Never    Smokeless tobacco: Never   Substance and Sexual Activity    Alcohol use: No    Drug use: No    Sexual activity: Yes     Partners: Male      Family History   Problem Relation Age of Onset    Downs Syndrome Brother     Autism Child        No Known Allergies  Prior to Admission medications    Medication Sig Start Date End Date Taking? Authorizing Provider   aspirin 81 mg cap Take  by mouth. Provider, Historical   prenatal vit-iron fumarate-fa (PRENATAL PLUS WITH IRON) 28 mg iron- 800 mcg tab Take 1 Tablet by mouth daily. Provider, Historical        Review of Systems:  Negative except per HPI. Objective:     Vitals:    Vitals:    23 0024 23 0026   Temp:  97.5 °F (36.4 °C)   Weight: 147 lb (66.7 kg)    Height: 5' 4\" (1.626 m)       Temp (24hrs), Av.5 °F (36.4 °C), Min:97.5 °F (36.4 °C), Max:97.5 °F (36.4 °C)    No data recorded     Physical Exam: (cervical exam performed by Midwife Tim Jimenes)  Patient without distress. Heart: Regular rate and rhythm, S1S2 present, or without murmur or extra heart sounds  Lung: clear to auscultation throughout lung fields, no wheezes, no rales, no rhonchi, and normal respiratory effort  Abdomen: soft, nontender  Perineum: blood absent, amniotic fluid present  Cervical Exam: 5 cm dilated    90% effaced    -2 station    Lower Extremities:  - Edema No     Membranes:  Spontaneous Rupture of Membranes; Amniotic Fluid: clear fluid, forebag present  Fetal Heart Rate:  Reactive  Baseline: 135 per minute  Variability: moderate  Accelerations: yes  Decelerations: none  Uterine contractions: regular, every 2 minutes       Lab/Data Review:  No results found for this or any previous visit (from the past 24 hour(s)). Assessment and Plan:     Ms. Qasim Carpenter is a 41 y.o.   female with an estimated gestational age of 38w3d who is here for latent labor.     Latent labor - patient SROM'ed, started contractions five hours prior to presentation to ED. Current SVE performed by midwife: /-2. She is hoping to have an epidural. GBS neg.   - start bolus for epidural, referral placed to anesthesiology  - fetal monitoring  - plan for TOLAC, has had 3 prior VBACs  - next check: ~0400  - patient counseled to make staff aware if she is experiencing pressure or needs to push  - anticipate  this am     SIUP at [facility]. PNL: O+, Ab neg, Hgb fractionation wnl. HIV/RPR/HBV/HCV non reactive. GC/CT neg. H/o CS, has had three prior VBACs. On ASA for AMA. - Last U/S at 34w6d showing EFW 98%, AC 10%, cephalic presentation. Female infant, normal anatomy. Anterior Placenta. Fetal well- being: Cat I strip    Birth Plan: TOLAC,  with epidural  - Feeding: breast and bottle  - Pediatrician: [facility]      I have discussed the diagnosis with the patient and the intended plan as seen in the above orders. All questions were answered concerning future plans. I have discussed medication side effects and warnings with the patient as well. Labor precautions discussed, including: Regular painful contractions, lasting for greater than one hour, taking your breath away; any vaginal bleeding; any leakage of fluid; or absent or decreased fetal movement. Call M.D. on call if any of these symptoms or signs occur. Patient will be discussed with Alyssa Ricci Midwife. Say Mendoza MD  Family Medicine Resident      Patient seen and assessed. Counseled on risks and wishes to proceed with .  Edilberto Harris CNM

## 2023-02-25 NOTE — PROGRESS NOTES
Labor Progress Note  Patient seen, fetal heart rate and contraction pattern evaluated, patient examined. Patient reports improvement with pain after epidural. Is feeling some pressure but does not feel that it is time to push yet. She does report that the pressure is increasing. Forebag has ruptured. Patient Vitals for the past 4 hrs:   Temp SpO2   23 0026 97.5 °F (36.4 °C) --   23 0005 -- 100 %            Physical Exam:  Cervical Exam:  8cm at 0200, check by TAMIA Abbasi  Fetal Heart Rate: Reactive  Baseline: 140 per minute  Variability: moderate  Accelerations: yes  Decelerations: occasional variables with contractions  Contractions: regular, every 2 minutes      Assessment/Plan   Ms. Bhavesh Jerez is a 41 y.o.  female with an estimated gestational age of 38w3d who is here for active labor. Active labor - patient [address], started contractions five hours prior to presentation to ED. GBS neg. Current SVE performed by midwife: 8cm, progressed from 5cm two hours ago. S/p epidural at 0200.  - fetal monitoring  - plan for TOLAC, has had 3 prior VBACs  - patient counseled to make staff aware if she is experiencing pressure or needs to push  - anticipate  this am   - confirmed cephalic on U/S    SIUP at [facility]. PNL: O+, Ab neg, Hgb fractionation wnl. HIV/RPR/HBV/HCV non reactive. GC/CT neg. H/o CS, has had three prior VBACs. On ASA for AMA. GBS neg.   - Last U/S at 34w6d showing EFW 19%, AC 40%, cephalic presentation. Female infant, normal anatomy. Anterior Placenta. - will confirm presentation at this time    Fetal well- being: Cat I strip    Birth Plan: TOLAC,  with epidural  - Feeding: breast and bottle  - Pediatrician: [facility]    Pt will be discussed with Nicole Davila CNM.     Glenroy Phillips MD  [facility] Resident

## 2023-02-25 NOTE — L&D DELIVERY NOTE
Delivery Summary  Patient progressed well to C/C/+2 and pushed for approximately 2 min w/  of a liveborn F infant, Apgar scores were 8/9. Head delivered slightly ALEX. Anterior left shoulder delivered w/ single maternal effort w/ posterior shoulder and body following easily. Infant placed on maternal abdomen. Delayed cord clamping x 1 min. Cord clamped x 2 and cut by MOB. Pitocin infusion initiated. Placenta delivered spontaneously intact w/ 3 v cord. Perineum inspected. Fundus firm at U. Mother and baby bonding in LDR. Delivery QBL 75.    Patient: Tristen Marion MRN: [id number]  SSN: [id number]    YOB: 1985  Age: 41 y.o. Sex: female       Information for the patient's :  Jonny Sanchez [[id number]]     Labor Events:    Labor: No    Steroids: None   Cervical Ripening Date/Time:       Cervical Ripening Type: None   Antibiotics During Labor:     Rupture Identifier:      Rupture Date/Time: 2023 7:30 PM   Rupture Type: SROM   Amniotic Fluid Volume:  Moderate    Amniotic Fluid Description: Clear    Amniotic Fluid Odor: None    Induction: None       Induction Date/Time:        Indications for Induction:      Augmentation: None   Augmentation Date/Time:      Indications for Augmentation:     Labor complications: None       Additional complications:        Delivery Events:  Indications For Episiotomy:     Episiotomy: None   Perineal Laceration(s): None   Repaired:     Periurethral Laceration Location:      Repaired:     Labial Laceration Location:     Repaired:     Sulcal Laceration Location:     Repaired:     Vaginal Laceration Location:     Repaired:     Cervical Laceration Location:     Repaired:     Repair Suture: None   Number of Repair Packets:     Estimated Blood Loss (ml):  ml   Quantitative Blood Loss (ml)                Delivery Date: 2023    Delivery Time: 4:24 AM  Delivery Type: Vaginal, Spontaneous  Sex:  Female    Gestational Age: 38w3d   Delivery Clinician:  Melissa Winslow  Living Status: Living   Delivery Location: L&D            APGARS  One minute Five minutes Ten minutes   Skin color: 0   1        Heart rate: 2   2        Grimace: 2   2        Muscle tone: 2   2        Breathin   2        Totals: 8   9            Presentation: Vertex    Position: Left Occiput Anterior  Resuscitation Method:  Suctioning-bulb; Tactile Stimulation     Meconium Stained: None      Cord Information: 3 Vessels  Complications: None  Cord around:    Delayed cord clamping? Yes  Cord clamped date/time:2023  4:28 AM  Disposition of Cord Blood: Lab    Blood Gases Sent?: No    Placenta:  Date/Time: 2023  4:28 AM  Removal: Expressed      Appearance: Normal      Measurements:  Birth Weight:        Birth Length:        Head Circumference:        Chest Circumference:       Abdominal Girth: Other Providers:   DORI LALA;SHANIQUE DENISE;TROY HART;Zachary COATES, Obstetrician;Primary Nurse;Primary [place] Nurse;Charge Nurse;Nursery Nurse; Anesthesiologist;Crna;Nurse Practitioner;Midwife;Nursery Nurse         Group B Strep: No results found for: Mikki Lester  Information for the patient's :  Sandi Díaz [[id number]]   No results found for: ABORH, PCTABR, PCTDIG, BILI, ABORHEXT, ABORH   No results for input(s): PCO2CB, PO2CB, HCO3I, SO2I, IBD, PTEMPI, SPECTI, PHICB, ISITE, IDEV, IALLEN in the last 72 hours.

## 2023-02-25 NOTE — ANESTHESIA PREPROCEDURE EVALUATION
Patient last saw PCP on 05/21/19-no future visits set   (2 problem visits with NP)   Last fill of NORCO on 06/01/2020   Relevant Problems   No relevant active problems       Anesthesia Evaluation    Physical Exam    Airway  Mallampati: II  TM Distance: 4 - 6 cm  Neck ROM: normal range of motion   Mouth opening: Normal     Cardiovascular    Rhythm: regular           Dental  No notable dental hx       Pulmonary  Breath sounds clear to auscultation               Abdominal         Other Findings          Past Medical History:   Diagnosis Date    Antepartum anemia 2022     delivery delivered     History of  2018    Pregnancy 2018     (vaginal birth after )     , delivered 2018    , delivered 2018     Anesthetic Plan    ASA: 2  Anesthesia type: epidural            Anesthetic plan and risks discussed with: Patient

## 2023-02-26 VITALS
HEART RATE: 61 BPM | BODY MASS INDEX: 25.1 KG/M2 | HEIGHT: 64 IN | WEIGHT: 147 LBS | SYSTOLIC BLOOD PRESSURE: 115 MMHG | OXYGEN SATURATION: 97 % | DIASTOLIC BLOOD PRESSURE: 61 MMHG | RESPIRATION RATE: 15 BRPM | TEMPERATURE: 97.8 F

## 2023-02-26 PROCEDURE — 74011250637 HC RX REV CODE- 250/637

## 2023-02-26 PROCEDURE — 74011250637 HC RX REV CODE- 250/637: Performed by: ADVANCED PRACTICE MIDWIFE

## 2023-02-26 RX ORDER — SODIUM CHLORIDE 0.9 % (FLUSH) 0.9 %
5-40 SYRINGE (ML) INJECTION EVERY 8 HOURS
Status: DISCONTINUED | OUTPATIENT
Start: 2023-02-26 | End: 2023-02-26 | Stop reason: HOSPADM

## 2023-02-26 RX ORDER — IBUPROFEN 800 MG/1
800 TABLET ORAL
Qty: 21 TABLET | Refills: 0 | Status: SHIPPED | OUTPATIENT
Start: 2023-02-26

## 2023-02-26 RX ORDER — SODIUM CHLORIDE 0.9 % (FLUSH) 0.9 %
5-40 SYRINGE (ML) INJECTION AS NEEDED
Status: DISCONTINUED | OUTPATIENT
Start: 2023-02-26 | End: 2023-02-26 | Stop reason: HOSPADM

## 2023-02-26 RX ORDER — SODIUM CHLORIDE, SODIUM LACTATE, POTASSIUM CHLORIDE, CALCIUM CHLORIDE 600; 310; 30; 20 MG/100ML; MG/100ML; MG/100ML; MG/100ML
125 INJECTION, SOLUTION INTRAVENOUS CONTINUOUS
Status: DISCONTINUED | OUTPATIENT
Start: 2023-02-26 | End: 2023-02-26 | Stop reason: HOSPADM

## 2023-02-26 RX ORDER — OXYTOCIN/RINGER'S LACTATE 30/500 ML
10 PLASTIC BAG, INJECTION (ML) INTRAVENOUS AS NEEDED
Status: DISCONTINUED | OUTPATIENT
Start: 2023-02-26 | End: 2023-02-26 | Stop reason: HOSPADM

## 2023-02-26 RX ORDER — OXYTOCIN/RINGER'S LACTATE 30/500 ML
87.3 PLASTIC BAG, INJECTION (ML) INTRAVENOUS AS NEEDED
Status: DISCONTINUED | OUTPATIENT
Start: 2023-02-26 | End: 2023-02-26 | Stop reason: HOSPADM

## 2023-02-26 RX ADMIN — IBUPROFEN 800 MG: 800 TABLET, FILM COATED ORAL at 05:00

## 2023-02-26 RX ADMIN — ACETAMINOPHEN 650 MG: 325 TABLET ORAL at 05:01

## 2023-02-26 NOTE — ROUTINE PROCESS
Bedside and Verbal shift change report given to Lon Betancourt RN  (oncoming nurse) by Aye Nascimento RN  (offgoing nurse). Report included the following information SBAR, Kardex, Intake/Output, MAR, and Recent Results.

## 2023-02-26 NOTE — DISCHARGE SUMMARY
Obstetrical Discharge Summary     Name: Christopher Snellen MRN: [id number]  SSN: [id number]    YOB: 1985  Age: 41 y.o. Sex: female      Admit Date: 2023    Discharge Date: 2023     Admitting Physician: Mari Ac MD     Attending Physician:  Prachi Alatorre DO     Admission Diagnoses: Pregnancy [Z34.90]    Discharge Diagnoses:   Information for the patient's :  Morna Son [[id number]]   Delivery of a 7 lb 2.3 oz (3.24 kg) female infant via Vaginal, Spontaneous on 2023 at 4:24 AM  by Reinier Lozano. Apgars were 8  and 9 . Additional Diagnoses:   Hospital Problems  Date Reviewed: 2023            Codes Class Noted POA    Pregnancy ICD-10-CM: Z34.90  ICD-9-CM: V22.2  2023 Unknown          Lab Results   Component Value Date/Time    Rubella, External immune 2018 12:00 AM     Immunization(s):   Immunization History   Administered Date(s) Administered    Influenza, FLUARIX, FLULAVAL, FLUZONE (age 10 mo+) AND AFLURIA, (age 1 y+), PF, 0.5mL 2015, 2022    Tdap 2018, 2022, 2023        Hospital Course:   Patient is a 41 y.o.   s/p    at 38 weeks 4 days. Presented for  Active Labor in setting of SROM. Pregnancy uncomplicated. Labor was uncomplicated. Delivered    by Dr. Bhargav Jensen without complications. Normal hospital course following the delivery. On day of discharge patient reported minimal lochia, well controlled pain, and no other complaints. Discharged with pain regimen, can take OTC bowel regimen as needed. Advised to continue prenatal vitamins.  Follow up with [facility] in 6 weeks    Condition at Discharge:  Stable  Disposition: Discharge to Home    Physical exam:  Visit Vitals  /61 (BP Patient Position: At rest)   Pulse 61   Temp 97.8 °F (36.6 °C)   Resp 15   Ht 5' 4\" (1.626 m)   Wt 147 lb (66.7 kg)   LMP  (LMP Unknown)   SpO2 97%   Breastfeeding Unknown   BMI 25.23 kg/m²     Exam:  Patient without distress. CTAB, no w/r/r/c               RRR, + S1 and S2, no m/r/g               Abdomen soft, fundus firm at level of umbilicus, non tender               Lower extremities are negative for swelling, cords or tenderness. Patient Instructions:   - Continue prenatal vitamins  - Follow up in 6 weeks at our clinic, we will call to schedule you an appointment    Reference my discharge instructions. Follow-up Information       Follow up With Specialties Details Why [facility] Follow up in 6 week(s) We will call to schedule appointment [address] [facility] [address].   [address]  [phone]          Signed By:  Omid Giron DO    Family Medicine Resident

## 2023-02-26 NOTE — ROUTINE PROCESS
Discharge instructions given at bedside via [employer] machine including but not limited to signs and symptoms and who to call; restrictions and limitations; postpartum depression. All questions answered. Discharge supplies given to patient. Signature pad not working in room. Hard signed copy placed in chart. Cuddles tag removed. Infant bands verified with parents and footprint sheet. Carseat checked.

## 2023-02-26 NOTE — DISCHARGE INSTRUCTIONS
After Your Delivery (the Postpartum Period): After Your Visit  Your Care Instructions  Congratulations on the birth of your baby. Like pregnancy, the  period can be a time of excitement, debbi, and exhaustion. You may look at your wondrous little baby and feel happy. You may also be overwhelmed by your new sleep hours and new responsibilities. At first, babies often sleep during the days and are awake at night. They do not have a pattern or routine. They may make sudden gasps, jerk themselves awake, or look like they have crossed eyes. These are all normal, and they may even make you smile. In these first weeks after delivery, try to take good care of yourself. It may take 4 to 6 weeks to feel like yourself again, and possibly longer if you had a  birth. You will likely feel very tired for several weeks. Your days will be full of ups and downs, but lots of debbi as well. Follow-up care is a key part of your treatment and safety. Be sure to make and go to all appointments, and call your doctor if you are having problems. It's also a good idea to know your test results and keep a list of the medicines you take. How can you care for yourself at home? Take care of your body after delivery  Use pads instead of tampons for the bloody flow that may last as long as 2 weeks. Ease cramps with ibuprofen (Advil, Motrin). Ease soreness of hemorrhoids and the area between your vagina and rectum with ice compresses or witch hazel pads. Ease constipation by drinking lots of fluid and eating high-fiber foods. Ask your doctor about over-the-counter stool softeners. Cleanse yourself with a gentle squeeze of warm water from a bottle instead of wiping with toilet paper. Take a sitz bath in warm water several times a day. Wear a good nursing bra. Ease sore and swollen breasts with warm, wet washcloths. If you are not breast-feeding, use ice rather than heat for breast soreness.   Your period may not start for several months if you are breast-feeding. You may bleed more, and longer at first, than you did before you got pregnant. Wait until you are healed (about 4 to 6 weeks) before you have sexual intercourse. Your doctor will tell you when it is okay to have sex. Try not to travel with your baby for 5 or 6 weeks. If you take a long car trip, make frequent stops to walk around and stretch. Avoid exhaustion  Rest every day. Try to nap when your baby naps. Ask another adult to be with you for a few days after delivery. Plan for  if you have other children. Stay flexible so you can eat at odd hours and sleep when you need to. Both you and your baby are making new schedules. Plan small trips to get out of the house. Change can make you feel less tired. Ask for help with housework, cooking, and shopping. Remind yourself that your job is to care for your baby. Know about help for postpartum depression  \"Baby blues\" are common for the first 1 to 2 weeks after birth. You may cry or feel sad or irritable for no reason. Rest whenever you can. Being tired makes it harder to handle your emotions. Go for walks with your baby. Talk to your partner, friends, and family about your feelings. If your symptoms last for more than a few weeks, or if you feel very depressed, ask your doctor for help. Postpartum depression can be treated. Support groups and counseling can help. Sometimes medicine can also help. Stay healthy  Eat healthy foods so you have more energy, make good breast milk, and lose extra baby pounds. If you breast-feed, avoid alcohol and drugs. Stay smoke-free. If you quit during pregnancy, congratulations. Start daily exercise after 4 to 6 weeks, but rest when you feel tired. Learn exercises to tone your belly. Do Kegel exercises to regain strength in your pelvic muscles. You can do these exercises while you stand or sit. Squeeze the same muscles you would use to stop your urine.  Your belly and rear end (buttocks) should not move. Hold the squeeze for 3 seconds, then relax for 3 seconds. Repeat the exercise 10 to 15 times for each session. Do three or more sessions each day. Find a class for new mothers and new babies that has an exercise time. If you had a  birth, give yourself a bit more time before you exercise, and be careful. When should you call for help? Call 911 anytime you think you may need emergency care. For example, call if:  You passed out (lost consciousness). Call your doctor now or seek immediate medical care if:  You have severe vaginal bleeding. This means you are passing blood clots and soaking through a pad each hour for 2 or more hours. You are dizzy or lightheaded, or you feel like you may faint. You have a fever. You have new belly pain, or your pain gets worse. Watch closely for changes in your health, and be sure to contact your doctor if:  Your vaginal bleeding seems to be getting heavier. You have new or worse vaginal discharge. You feel sad, anxious, or hopeless for more than a few days. You do not get better as expected. Where can you learn more? Go to [employer]  Enter A402 in the search box to learn more about \"After Your Delivery (the Postpartum Period): After Your Visit. \"       Follow up in 6 weeks at [facility]  Take ibuprofen every 8 hours as needed for pain. Take colace or miralax over the counter for constipation if needed. You do not need to keep taking aspirin.

## 2023-05-25 RX ORDER — IBUPROFEN 800 MG/1
800 TABLET ORAL EVERY 8 HOURS PRN
COMMUNITY
Start: 2023-02-26
